# Patient Record
Sex: MALE | Race: WHITE | NOT HISPANIC OR LATINO | Employment: UNEMPLOYED | URBAN - METROPOLITAN AREA
[De-identification: names, ages, dates, MRNs, and addresses within clinical notes are randomized per-mention and may not be internally consistent; named-entity substitution may affect disease eponyms.]

---

## 2018-03-09 ENCOUNTER — OFFICE VISIT (OUTPATIENT)
Dept: FAMILY MEDICINE | Age: 38
End: 2018-03-09

## 2018-03-09 VITALS
WEIGHT: 247 LBS | HEART RATE: 97 BPM | BODY MASS INDEX: 35.36 KG/M2 | SYSTOLIC BLOOD PRESSURE: 182 MMHG | RESPIRATION RATE: 18 BRPM | HEIGHT: 70 IN | TEMPERATURE: 97.8 F | OXYGEN SATURATION: 96 % | DIASTOLIC BLOOD PRESSURE: 104 MMHG

## 2018-03-09 DIAGNOSIS — I10 ESSENTIAL HYPERTENSION: ICD-10-CM

## 2018-03-09 DIAGNOSIS — F10.10 ETOH ABUSE: ICD-10-CM

## 2018-03-09 DIAGNOSIS — F32.1 MODERATE MAJOR DEPRESSION (CMD): Primary | ICD-10-CM

## 2018-03-09 PROCEDURE — 99203 OFFICE O/P NEW LOW 30 MIN: CPT | Performed by: FAMILY MEDICINE

## 2018-03-09 RX ORDER — METOPROLOL SUCCINATE 50 MG/1
50 TABLET, EXTENDED RELEASE ORAL DAILY
COMMUNITY
End: 2018-03-09 | Stop reason: SDUPTHER

## 2018-03-09 RX ORDER — TRAZODONE HYDROCHLORIDE 100 MG/1
100 TABLET ORAL NIGHTLY
COMMUNITY
End: 2018-03-09 | Stop reason: SDUPTHER

## 2018-03-09 RX ORDER — PANTOPRAZOLE SODIUM 40 MG/1
40 TABLET, DELAYED RELEASE ORAL DAILY
COMMUNITY
End: 2018-03-09 | Stop reason: SDUPTHER

## 2018-03-09 RX ORDER — LISINOPRIL 10 MG/1
10 TABLET ORAL DAILY
Qty: 30 TABLET | Refills: 3 | Status: SHIPPED | OUTPATIENT
Start: 2018-03-09 | End: 2018-05-11 | Stop reason: SDUPTHER

## 2018-03-09 RX ORDER — LISINOPRIL 10 MG/1
10 TABLET ORAL DAILY
COMMUNITY
End: 2018-03-09 | Stop reason: SDUPTHER

## 2018-03-09 RX ORDER — PANTOPRAZOLE SODIUM 40 MG/1
40 TABLET, DELAYED RELEASE ORAL DAILY
Qty: 30 TABLET | Refills: 3 | Status: SHIPPED | OUTPATIENT
Start: 2018-03-09 | End: 2018-05-11 | Stop reason: SDUPTHER

## 2018-03-09 RX ORDER — THIAMINE MONONITRATE (VIT B1) 100 MG
100 TABLET ORAL DAILY
COMMUNITY
End: 2018-03-09 | Stop reason: ALTCHOICE

## 2018-03-09 RX ORDER — TRAZODONE HYDROCHLORIDE 100 MG/1
100 TABLET ORAL NIGHTLY
Qty: 30 TABLET | Refills: 3 | Status: SHIPPED | OUTPATIENT
Start: 2018-03-09 | End: 2018-05-11 | Stop reason: SDUPTHER

## 2018-03-09 RX ORDER — METOPROLOL SUCCINATE 50 MG/1
50 TABLET, EXTENDED RELEASE ORAL DAILY
Qty: 30 TABLET | Refills: 3 | Status: SHIPPED | OUTPATIENT
Start: 2018-03-09 | End: 2018-05-11 | Stop reason: SDUPTHER

## 2018-03-23 ENCOUNTER — TELEPHONE (OUTPATIENT)
Dept: FAMILY MEDICINE | Age: 38
End: 2018-03-23

## 2018-03-27 PROBLEM — F32.1 MODERATE MAJOR DEPRESSION (CMD): Status: ACTIVE | Noted: 2018-03-27

## 2018-03-27 PROBLEM — I10 ESSENTIAL HYPERTENSION: Status: ACTIVE | Noted: 2018-03-27

## 2018-03-27 PROBLEM — F10.10 ETOH ABUSE: Status: ACTIVE | Noted: 2018-03-27

## 2018-05-11 ENCOUNTER — OFFICE VISIT (OUTPATIENT)
Dept: FAMILY MEDICINE | Age: 38
End: 2018-05-11

## 2018-05-11 ENCOUNTER — LAB SERVICES (OUTPATIENT)
Dept: LAB | Age: 38
End: 2018-05-11

## 2018-05-11 VITALS
DIASTOLIC BLOOD PRESSURE: 78 MMHG | WEIGHT: 252 LBS | HEART RATE: 91 BPM | BODY MASS INDEX: 36.08 KG/M2 | HEIGHT: 70 IN | RESPIRATION RATE: 18 BRPM | OXYGEN SATURATION: 99 % | SYSTOLIC BLOOD PRESSURE: 130 MMHG | TEMPERATURE: 97.8 F

## 2018-05-11 DIAGNOSIS — I10 ESSENTIAL HYPERTENSION: Primary | ICD-10-CM

## 2018-05-11 DIAGNOSIS — I10 ESSENTIAL HYPERTENSION: ICD-10-CM

## 2018-05-11 DIAGNOSIS — F32.1 MODERATE MAJOR DEPRESSION (CMD): ICD-10-CM

## 2018-05-11 DIAGNOSIS — F10.10 ETOH ABUSE: ICD-10-CM

## 2018-05-11 LAB
ALBUMIN SERPL-MCNC: 4.2 G/DL (ref 3.6–5.1)
ALBUMIN/GLOB SERPL: 1.3 {RATIO} (ref 1–2.4)
ALP SERPL-CCNC: 75 UNITS/L (ref 45–117)
ALT SERPL-CCNC: 26 UNITS/L
ANION GAP SERPL CALC-SCNC: 16 MMOL/L (ref 10–20)
AST SERPL-CCNC: 19 UNITS/L
BILIRUB SERPL-MCNC: 1.1 MG/DL (ref 0.2–1)
BUN SERPL-MCNC: 14 MG/DL (ref 6–20)
BUN/CREAT SERPL: 23 (ref 7–25)
CALCIUM SERPL-MCNC: 9 MG/DL (ref 8.4–10.2)
CHLORIDE SERPL-SCNC: 104 MMOL/L (ref 98–107)
CO2 SERPL-SCNC: 25 MMOL/L (ref 21–32)
CREAT SERPL-MCNC: 0.62 MG/DL (ref 0.67–1.17)
FASTING STATUS PATIENT QL REPORTED: 1.5 HRS
GLOBULIN SER-MCNC: 3.2 G/DL (ref 2–4)
GLUCOSE SERPL-MCNC: 89 MG/DL (ref 65–99)
POTASSIUM SERPL-SCNC: 4.2 MMOL/L (ref 3.4–5.1)
PROT SERPL-MCNC: 7.4 G/DL (ref 6.4–8.2)
SODIUM SERPL-SCNC: 141 MMOL/L (ref 135–145)

## 2018-05-11 PROCEDURE — 36415 COLL VENOUS BLD VENIPUNCTURE: CPT | Performed by: INTERNAL MEDICINE

## 2018-05-11 PROCEDURE — 99214 OFFICE O/P EST MOD 30 MIN: CPT | Performed by: FAMILY MEDICINE

## 2018-05-11 RX ORDER — PANTOPRAZOLE SODIUM 40 MG/1
40 TABLET, DELAYED RELEASE ORAL DAILY
Qty: 30 TABLET | Refills: 3 | Status: SHIPPED | OUTPATIENT
Start: 2018-05-11 | End: 2018-06-18 | Stop reason: SDUPTHER

## 2018-05-11 RX ORDER — TRAZODONE HYDROCHLORIDE 100 MG/1
100 TABLET ORAL NIGHTLY
Qty: 30 TABLET | Refills: 3 | Status: SHIPPED | OUTPATIENT
Start: 2018-05-11 | End: 2020-07-03 | Stop reason: ALTCHOICE

## 2018-05-11 RX ORDER — METOPROLOL SUCCINATE 50 MG/1
50 TABLET, EXTENDED RELEASE ORAL DAILY
Qty: 30 TABLET | Refills: 3 | Status: SHIPPED | OUTPATIENT
Start: 2018-05-11 | End: 2018-06-18 | Stop reason: SDUPTHER

## 2018-05-11 RX ORDER — LISINOPRIL 10 MG/1
10 TABLET ORAL DAILY
Qty: 30 TABLET | Refills: 3 | Status: SHIPPED | OUTPATIENT
Start: 2018-05-11 | End: 2018-06-18 | Stop reason: SDUPTHER

## 2018-05-11 RX ORDER — DISULFIRAM 250 MG/1
250 TABLET ORAL DAILY
Qty: 30 TABLET | Refills: 3 | Status: SHIPPED | OUTPATIENT
Start: 2018-05-11 | End: 2018-06-18 | Stop reason: SDUPTHER

## 2018-05-15 ENCOUNTER — TELEPHONE (OUTPATIENT)
Dept: FAMILY MEDICINE | Age: 38
End: 2018-05-15

## 2018-06-18 ENCOUNTER — OFFICE VISIT (OUTPATIENT)
Dept: FAMILY MEDICINE | Age: 38
End: 2018-06-18

## 2018-06-18 VITALS
TEMPERATURE: 98.7 F | WEIGHT: 259 LBS | RESPIRATION RATE: 18 BRPM | BODY MASS INDEX: 37.08 KG/M2 | OXYGEN SATURATION: 100 % | DIASTOLIC BLOOD PRESSURE: 73 MMHG | SYSTOLIC BLOOD PRESSURE: 117 MMHG | HEIGHT: 70 IN | HEART RATE: 75 BPM

## 2018-06-18 DIAGNOSIS — F10.10 ETOH ABUSE: Primary | ICD-10-CM

## 2018-06-18 PROCEDURE — 99213 OFFICE O/P EST LOW 20 MIN: CPT | Performed by: FAMILY MEDICINE

## 2018-06-18 RX ORDER — LISINOPRIL 10 MG/1
10 TABLET ORAL DAILY
Qty: 90 TABLET | Refills: 3 | Status: SHIPPED | OUTPATIENT
Start: 2018-06-18 | End: 2020-07-03 | Stop reason: SDUPTHER

## 2018-06-18 RX ORDER — DISULFIRAM 250 MG/1
250 TABLET ORAL DAILY
Qty: 90 TABLET | Refills: 3 | Status: SHIPPED | OUTPATIENT
Start: 2018-06-18 | End: 2020-07-03 | Stop reason: ALTCHOICE

## 2018-06-18 RX ORDER — METOPROLOL SUCCINATE 50 MG/1
50 TABLET, EXTENDED RELEASE ORAL DAILY
Qty: 90 TABLET | Refills: 3 | Status: SHIPPED | OUTPATIENT
Start: 2018-06-18 | End: 2020-07-03 | Stop reason: SDUPTHER

## 2018-06-18 RX ORDER — PANTOPRAZOLE SODIUM 40 MG/1
40 TABLET, DELAYED RELEASE ORAL DAILY
Qty: 90 TABLET | Refills: 3 | Status: SHIPPED | OUTPATIENT
Start: 2018-06-18 | End: 2020-07-03 | Stop reason: ALTCHOICE

## 2018-08-30 ENCOUNTER — TELEPHONE (OUTPATIENT)
Dept: FAMILY MEDICINE | Age: 38
End: 2018-08-30

## 2018-08-30 DIAGNOSIS — F10.10 ETOH ABUSE: Primary | ICD-10-CM

## 2018-09-04 ENCOUNTER — HOSPITAL ENCOUNTER (OUTPATIENT)
Dept: BEHAVIORAL HEALTH | Age: 38
Discharge: HOME | End: 2018-09-04
Attending: PSYCHIATRY & NEUROLOGY

## 2018-09-04 ASSESSMENT — LIFESTYLE VARIABLES
ALCOHOL_USE: YES
AUDIT-C TOTAL SCORE: 12
HOW MANY STANDARD DRINKS CONTAINING ALCOHOL DO YOU HAVE ON A TYPICAL DAY: 10 OR MORE
HOW OFTEN DO YOU HAVE A DRINK CONTAINING ALCOHOL: 4 OR MORE TIMES PER WEEK
HOW OFTEN DO YOU HAVE 6 OR MORE DRINKS ON ONE OCCASION: DAILY OR ALMOST DAILY

## 2018-09-04 ASSESSMENT — COLUMBIA-SUICIDE SEVERITY RATING SCALE - C-SSRS
TOTAL  NUMBER OF INTERRUPTED ATTEMPTS PAST 3 MONTHS: NO
6. HAVE YOU EVER DONE ANYTHING, STARTED TO DO ANYTHING, OR PREPARED TO DO ANYTHING TO END YOUR LIFE?: NO
ATTEMPT LIFETIME: NO
TOTAL  NUMBER OF ABORTED OR SELF INTERRUPTED ATTEMPTS PAST 3 MONTHS: NO
ATTEMPT PAST THREE MONTHS: NO
6. HAVE YOU EVER DONE ANYTHING, STARTED TO DO ANYTHING, OR PREPARED TO DO ANYTHING TO END YOUR LIFE?: NO
TOTAL  NUMBER OF INTERRUPTED ATTEMPTS LIFETIME: NO

## 2018-09-04 ASSESSMENT — COGNITIVE AND FUNCTIONAL STATUS - GENERAL
INSIGHT: UNREMARKABLE
MOOD: UNREMARKABLE
ATTENTION: UNREMARKABLE
THOUGHT_CONTENT: UNREMARKABLE
MOTOR_BEHAVIOR_RETARDATION: UNREMARKABLE
AFFECT_AND_BEHAVIOR: UNABLE TO ASSESS
JUDGEMENT: UNREMARKABLE
ORIENTED: PERSON;PLACE;CIRCUMSTANCE;TIME
THOUGHT_PROCESS: UNREMARKABLE
MOTOR_BEHAVIOR_AGITATION: UNREMARKABLE
LEVEL_OF_CONSCIOUSNESS: ALERT
MEMORY: UNREMARKABLE
SPEECH: UNREMARKABLE
DISORIENTED: TIME;CIRCUMSTANCE;PERSON;PLACE
APPEARANCE_AND_DRESS: UNREMARKABLE

## 2018-10-29 RX ORDER — DISULFIRAM 250 MG/1
250 TABLET ORAL DAILY
Qty: 30 TABLET | Refills: 0 | Status: SHIPPED | OUTPATIENT
Start: 2018-10-29 | End: 2020-07-03 | Stop reason: ALTCHOICE

## 2019-03-19 RX ORDER — LISINOPRIL 10 MG/1
10 TABLET ORAL DAILY
Qty: 30 TABLET | Refills: 0 | Status: SHIPPED | OUTPATIENT
Start: 2019-03-19 | End: 2020-03-06 | Stop reason: SDUPTHER

## 2019-03-19 RX ORDER — METOPROLOL SUCCINATE 50 MG/1
50 TABLET, EXTENDED RELEASE ORAL DAILY
Qty: 30 TABLET | Refills: 0 | Status: SHIPPED | OUTPATIENT
Start: 2019-03-19 | End: 2020-03-06 | Stop reason: SDUPTHER

## 2020-03-06 RX ORDER — METOPROLOL SUCCINATE 50 MG/1
50 TABLET, EXTENDED RELEASE ORAL DAILY
Qty: 30 TABLET | Refills: 0 | Status: SHIPPED | OUTPATIENT
Start: 2020-03-06 | End: 2020-03-23 | Stop reason: SDUPTHER

## 2020-03-06 RX ORDER — LISINOPRIL 10 MG/1
10 TABLET ORAL DAILY
Qty: 30 TABLET | Refills: 0 | Status: SHIPPED | OUTPATIENT
Start: 2020-03-06 | End: 2020-03-23 | Stop reason: SDUPTHER

## 2020-03-09 RX ORDER — METOPROLOL SUCCINATE 50 MG/1
50 TABLET, EXTENDED RELEASE ORAL DAILY
Qty: 90 TABLET | Refills: 0 | OUTPATIENT
Start: 2020-03-09

## 2020-03-09 RX ORDER — LISINOPRIL 10 MG/1
10 TABLET ORAL DAILY
Qty: 90 TABLET | Refills: 0 | OUTPATIENT
Start: 2020-03-09

## 2020-03-23 ENCOUNTER — TELEPHONE (OUTPATIENT)
Dept: FAMILY MEDICINE | Age: 40
End: 2020-03-23

## 2020-03-23 RX ORDER — LISINOPRIL 10 MG/1
10 TABLET ORAL DAILY
Qty: 90 TABLET | Refills: 0 | Status: SHIPPED | OUTPATIENT
Start: 2020-03-23 | End: 2020-07-03 | Stop reason: SDUPTHER

## 2020-03-23 RX ORDER — METOPROLOL SUCCINATE 50 MG/1
50 TABLET, EXTENDED RELEASE ORAL DAILY
Qty: 90 TABLET | Refills: 0 | Status: SHIPPED | OUTPATIENT
Start: 2020-03-23 | End: 2020-07-03 | Stop reason: SDUPTHER

## 2020-07-03 ENCOUNTER — OFFICE VISIT (OUTPATIENT)
Dept: FAMILY MEDICINE | Age: 40
End: 2020-07-03

## 2020-07-03 VITALS
SYSTOLIC BLOOD PRESSURE: 165 MMHG | HEART RATE: 68 BPM | HEIGHT: 70 IN | BODY MASS INDEX: 38.73 KG/M2 | DIASTOLIC BLOOD PRESSURE: 88 MMHG | WEIGHT: 270.5 LBS | TEMPERATURE: 97.1 F | OXYGEN SATURATION: 100 %

## 2020-07-03 DIAGNOSIS — I10 ESSENTIAL HYPERTENSION: Primary | ICD-10-CM

## 2020-07-03 DIAGNOSIS — Z23 NEED FOR VACCINATION: ICD-10-CM

## 2020-07-03 DIAGNOSIS — F10.10 ETOH ABUSE: ICD-10-CM

## 2020-07-03 PROCEDURE — 99214 OFFICE O/P EST MOD 30 MIN: CPT | Performed by: FAMILY MEDICINE

## 2020-07-03 PROCEDURE — 90715 TDAP VACCINE 7 YRS/> IM: CPT | Performed by: FAMILY MEDICINE

## 2020-07-03 PROCEDURE — 90471 IMMUNIZATION ADMIN: CPT | Performed by: FAMILY MEDICINE

## 2020-07-03 RX ORDER — LISINOPRIL 20 MG/1
20 TABLET ORAL DAILY
Qty: 90 TABLET | Refills: 1 | Status: SHIPPED | OUTPATIENT
Start: 2020-07-03 | End: 2020-12-09 | Stop reason: SDUPTHER

## 2020-07-03 RX ORDER — METOPROLOL SUCCINATE 50 MG/1
50 TABLET, EXTENDED RELEASE ORAL DAILY
Qty: 90 TABLET | Refills: 1 | Status: SHIPPED | OUTPATIENT
Start: 2020-07-03 | End: 2020-12-09 | Stop reason: SDUPTHER

## 2020-07-03 SDOH — HEALTH STABILITY: MENTAL HEALTH: HOW OFTEN DO YOU HAVE 6 OR MORE DRINKS ON ONE OCCASION?: DAILY OR ALMOST DAILY

## 2020-07-03 SDOH — HEALTH STABILITY: MENTAL HEALTH: HOW MANY STANDARD DRINKS CONTAINING ALCOHOL DO YOU HAVE ON A TYPICAL DAY?: 10 OR MORE

## 2020-07-03 SDOH — HEALTH STABILITY: MENTAL HEALTH: HOW OFTEN DO YOU HAVE A DRINK CONTAINING ALCOHOL?: 4 OR MORE TIMES A WEEK

## 2020-07-03 ASSESSMENT — PATIENT HEALTH QUESTIONNAIRE - PHQ9
SUM OF ALL RESPONSES TO PHQ9 QUESTIONS 1 AND 2: 0
CLINICAL INTERPRETATION OF PHQ2 SCORE: NO FURTHER SCREENING NEEDED
1. LITTLE INTEREST OR PLEASURE IN DOING THINGS: NOT AT ALL
SUM OF ALL RESPONSES TO PHQ9 QUESTIONS 1 AND 2: 0
CLINICAL INTERPRETATION OF PHQ9 SCORE: NO FURTHER SCREENING NEEDED
2. FEELING DOWN, DEPRESSED OR HOPELESS: NOT AT ALL

## 2020-07-07 ENCOUNTER — TELEPHONE (OUTPATIENT)
Dept: FAMILY MEDICINE | Age: 40
End: 2020-07-07

## 2020-10-07 ENCOUNTER — TELEPHONE (OUTPATIENT)
Dept: FAMILY MEDICINE | Age: 40
End: 2020-10-07

## 2020-12-10 RX ORDER — METOPROLOL SUCCINATE 50 MG/1
50 TABLET, EXTENDED RELEASE ORAL DAILY
Qty: 90 TABLET | Refills: 0 | Status: SHIPPED | OUTPATIENT
Start: 2020-12-10 | End: 2021-03-15 | Stop reason: SDUPTHER

## 2020-12-10 RX ORDER — LISINOPRIL 20 MG/1
20 TABLET ORAL DAILY
Qty: 90 TABLET | Refills: 0 | Status: SHIPPED | OUTPATIENT
Start: 2020-12-10 | End: 2021-03-15 | Stop reason: SDUPTHER

## 2021-03-18 RX ORDER — METOPROLOL SUCCINATE 50 MG/1
50 TABLET, EXTENDED RELEASE ORAL DAILY
Qty: 90 TABLET | Refills: 0 | Status: SHIPPED | OUTPATIENT
Start: 2021-03-18 | End: 2021-06-14

## 2021-03-18 RX ORDER — LISINOPRIL 20 MG/1
20 TABLET ORAL DAILY
Qty: 90 TABLET | Refills: 0 | Status: SHIPPED | OUTPATIENT
Start: 2021-03-18 | End: 2021-06-14

## 2021-06-14 RX ORDER — LISINOPRIL 20 MG/1
20 TABLET ORAL DAILY
Qty: 30 TABLET | Refills: 0 | Status: SHIPPED | OUTPATIENT
Start: 2021-06-14 | End: 2021-07-28 | Stop reason: SDUPTHER

## 2021-06-14 RX ORDER — METOPROLOL SUCCINATE 50 MG/1
50 TABLET, EXTENDED RELEASE ORAL DAILY
Qty: 30 TABLET | Refills: 0 | Status: SHIPPED | OUTPATIENT
Start: 2021-06-14 | End: 2021-07-28 | Stop reason: SDUPTHER

## 2021-06-14 RX ORDER — METOPROLOL SUCCINATE 50 MG/1
TABLET, EXTENDED RELEASE ORAL
Qty: 90 TABLET | OUTPATIENT
Start: 2021-06-14

## 2021-06-14 RX ORDER — LISINOPRIL 20 MG/1
TABLET ORAL
Qty: 90 TABLET | OUTPATIENT
Start: 2021-06-14

## 2021-07-28 RX ORDER — METOPROLOL SUCCINATE 50 MG/1
TABLET, EXTENDED RELEASE ORAL
Qty: 30 TABLET | Refills: 0 | OUTPATIENT
Start: 2021-07-28

## 2021-07-28 RX ORDER — LISINOPRIL 20 MG/1
TABLET ORAL
Qty: 30 TABLET | Refills: 0 | OUTPATIENT
Start: 2021-07-28

## 2021-07-28 RX ORDER — METOPROLOL SUCCINATE 50 MG/1
50 TABLET, EXTENDED RELEASE ORAL DAILY
Qty: 30 TABLET | Refills: 0 | Status: SHIPPED | OUTPATIENT
Start: 2021-07-28 | End: 2021-09-08

## 2021-07-28 RX ORDER — LISINOPRIL 20 MG/1
20 TABLET ORAL DAILY
Qty: 30 TABLET | Refills: 0 | Status: SHIPPED | OUTPATIENT
Start: 2021-07-28 | End: 2021-09-08

## 2021-07-29 RX ORDER — LISINOPRIL 20 MG/1
20 TABLET ORAL DAILY
Qty: 90 TABLET | OUTPATIENT
Start: 2021-07-29

## 2021-07-29 RX ORDER — METOPROLOL SUCCINATE 50 MG/1
50 TABLET, EXTENDED RELEASE ORAL DAILY
Qty: 90 TABLET | OUTPATIENT
Start: 2021-07-29

## 2021-09-08 RX ORDER — METOPROLOL SUCCINATE 50 MG/1
50 TABLET, EXTENDED RELEASE ORAL DAILY
Qty: 30 TABLET | Refills: 1 | Status: SHIPPED | OUTPATIENT
Start: 2021-09-08 | End: 2021-11-02 | Stop reason: SDUPTHER

## 2021-09-08 RX ORDER — LISINOPRIL 20 MG/1
20 TABLET ORAL DAILY
Qty: 30 TABLET | Refills: 1 | Status: SHIPPED | OUTPATIENT
Start: 2021-09-08 | End: 2021-11-02 | Stop reason: SDUPTHER

## 2021-11-02 ENCOUNTER — OFFICE VISIT (OUTPATIENT)
Dept: FAMILY MEDICINE | Age: 41
End: 2021-11-02

## 2021-11-02 ENCOUNTER — LAB SERVICES (OUTPATIENT)
Dept: LAB | Age: 41
End: 2021-11-02

## 2021-11-02 VITALS
DIASTOLIC BLOOD PRESSURE: 91 MMHG | SYSTOLIC BLOOD PRESSURE: 158 MMHG | HEART RATE: 84 BPM | TEMPERATURE: 98.4 F | OXYGEN SATURATION: 99 % | BODY MASS INDEX: 36.96 KG/M2 | HEIGHT: 71 IN | WEIGHT: 264 LBS

## 2021-11-02 DIAGNOSIS — Z23 NEED FOR PNEUMOCOCCAL VACCINATION: ICD-10-CM

## 2021-11-02 DIAGNOSIS — Z23 INFLUENZA VACCINE NEEDED: ICD-10-CM

## 2021-11-02 DIAGNOSIS — Z00.00 ROUTINE GENERAL MEDICAL EXAMINATION AT A HEALTH CARE FACILITY: Primary | ICD-10-CM

## 2021-11-02 DIAGNOSIS — Z00.00 ROUTINE GENERAL MEDICAL EXAMINATION AT HEALTH CARE FACILITY: ICD-10-CM

## 2021-11-02 DIAGNOSIS — Z00.00 ROUTINE GENERAL MEDICAL EXAMINATION AT A HEALTH CARE FACILITY: ICD-10-CM

## 2021-11-02 PROCEDURE — 80053 COMPREHEN METABOLIC PANEL: CPT | Performed by: INTERNAL MEDICINE

## 2021-11-02 PROCEDURE — 90471 IMMUNIZATION ADMIN: CPT | Performed by: FAMILY MEDICINE

## 2021-11-02 PROCEDURE — 99396 PREV VISIT EST AGE 40-64: CPT | Performed by: FAMILY MEDICINE

## 2021-11-02 PROCEDURE — 36415 COLL VENOUS BLD VENIPUNCTURE: CPT | Performed by: INTERNAL MEDICINE

## 2021-11-02 PROCEDURE — 90686 IIV4 VACC NO PRSV 0.5 ML IM: CPT | Performed by: FAMILY MEDICINE

## 2021-11-02 PROCEDURE — 90732 PPSV23 VACC 2 YRS+ SUBQ/IM: CPT | Performed by: FAMILY MEDICINE

## 2021-11-02 PROCEDURE — 82306 VITAMIN D 25 HYDROXY: CPT | Performed by: INTERNAL MEDICINE

## 2021-11-02 RX ORDER — LISINOPRIL 20 MG/1
20 TABLET ORAL DAILY
Qty: 90 TABLET | Refills: 1 | Status: SHIPPED | OUTPATIENT
Start: 2021-11-02 | End: 2021-11-05

## 2021-11-02 RX ORDER — OMEPRAZOLE 20 MG/1
20 CAPSULE, DELAYED RELEASE ORAL DAILY
Qty: 90 CAPSULE | Refills: 3 | Status: SHIPPED | COMMUNITY
Start: 2021-11-02

## 2021-11-02 RX ORDER — METOPROLOL SUCCINATE 50 MG/1
50 TABLET, EXTENDED RELEASE ORAL DAILY
Qty: 90 TABLET | Refills: 1 | Status: SHIPPED | OUTPATIENT
Start: 2021-11-02 | End: 2021-11-05

## 2021-11-02 ASSESSMENT — PATIENT HEALTH QUESTIONNAIRE - PHQ9
1. LITTLE INTEREST OR PLEASURE IN DOING THINGS: NOT AT ALL
CLINICAL INTERPRETATION OF PHQ9 SCORE: NO FURTHER SCREENING NEEDED
SUM OF ALL RESPONSES TO PHQ9 QUESTIONS 1 AND 2: 0
CLINICAL INTERPRETATION OF PHQ2 SCORE: NO FURTHER SCREENING NEEDED
SUM OF ALL RESPONSES TO PHQ9 QUESTIONS 1 AND 2: 0
2. FEELING DOWN, DEPRESSED OR HOPELESS: NOT AT ALL
SUM OF ALL RESPONSES TO PHQ9 QUESTIONS 1 AND 2: 0

## 2021-11-03 ENCOUNTER — TELEPHONE (OUTPATIENT)
Dept: FAMILY MEDICINE | Age: 41
End: 2021-11-03

## 2021-11-03 DIAGNOSIS — E55.9 VITAMIN D DEFICIENCY: Primary | ICD-10-CM

## 2021-11-03 LAB
25(OH)D3+25(OH)D2 SERPL-MCNC: 10.4 NG/ML (ref 30–100)
ALBUMIN SERPL-MCNC: 4.2 G/DL (ref 3.6–5.1)
ALBUMIN/GLOB SERPL: 1.2 {RATIO} (ref 1–2.4)
ALP SERPL-CCNC: 68 UNITS/L (ref 45–117)
ALT SERPL-CCNC: 30 UNITS/L
ANION GAP SERPL CALC-SCNC: 12 MMOL/L (ref 10–20)
AST SERPL-CCNC: 28 UNITS/L
BILIRUB SERPL-MCNC: 0.6 MG/DL (ref 0.2–1)
BUN SERPL-MCNC: 13 MG/DL (ref 6–20)
BUN/CREAT SERPL: 15 (ref 7–25)
CALCIUM SERPL-MCNC: 10.2 MG/DL (ref 8.4–10.2)
CHLORIDE SERPL-SCNC: 104 MMOL/L (ref 98–107)
CO2 SERPL-SCNC: 26 MMOL/L (ref 21–32)
CREAT SERPL-MCNC: 0.87 MG/DL (ref 0.67–1.17)
FASTING DURATION TIME PATIENT: ABNORMAL H
GFR SERPLBLD BASED ON 1.73 SQ M-ARVRAT: >90 ML/MIN
GLOBULIN SER-MCNC: 3.5 G/DL (ref 2–4)
GLUCOSE SERPL-MCNC: 101 MG/DL (ref 70–99)
POTASSIUM SERPL-SCNC: 4.7 MMOL/L (ref 3.4–5.1)
PROT SERPL-MCNC: 7.7 G/DL (ref 6.4–8.2)
SODIUM SERPL-SCNC: 137 MMOL/L (ref 135–145)

## 2021-11-05 RX ORDER — LISINOPRIL 20 MG/1
20 TABLET ORAL DAILY
Qty: 30 TABLET | Refills: 0 | Status: SHIPPED | OUTPATIENT
Start: 2021-11-05 | End: 2022-05-03 | Stop reason: SDUPTHER

## 2021-11-05 RX ORDER — METOPROLOL SUCCINATE 50 MG/1
50 TABLET, EXTENDED RELEASE ORAL DAILY
Qty: 30 TABLET | Refills: 0 | Status: SHIPPED | OUTPATIENT
Start: 2021-11-05 | End: 2022-05-03 | Stop reason: SDUPTHER

## 2022-01-13 ENCOUNTER — OFFICE VISIT (OUTPATIENT)
Dept: OCCUPATIONAL MEDICINE | Age: 42
End: 2022-01-13

## 2022-01-13 DIAGNOSIS — Z00.8 HEALTH EXAMINATION IN POPULATION SURVEY: ICD-10-CM

## 2022-01-13 PROCEDURE — OH123 RAPID TEST DRUG KIT & COLLECTION 5 PANEL: Performed by: PREVENTIVE MEDICINE

## 2022-05-03 ENCOUNTER — OFFICE VISIT (OUTPATIENT)
Dept: FAMILY MEDICINE | Age: 42
End: 2022-05-03

## 2022-05-03 VITALS
HEIGHT: 72 IN | WEIGHT: 276.6 LBS | BODY MASS INDEX: 37.46 KG/M2 | HEART RATE: 90 BPM | DIASTOLIC BLOOD PRESSURE: 82 MMHG | SYSTOLIC BLOOD PRESSURE: 134 MMHG | OXYGEN SATURATION: 99 %

## 2022-05-03 DIAGNOSIS — I10 ESSENTIAL HYPERTENSION: Primary | ICD-10-CM

## 2022-05-03 DIAGNOSIS — Z23 NEED FOR COVID-19 VACCINE: ICD-10-CM

## 2022-05-03 PROCEDURE — 91305 COVID 19 12Y AND OLDER PFIZER-BIONTECH - DO NOT DILUTE: CPT | Performed by: INTERNAL MEDICINE

## 2022-05-03 PROCEDURE — 99213 OFFICE O/P EST LOW 20 MIN: CPT | Performed by: FAMILY MEDICINE

## 2022-05-03 PROCEDURE — 0054A COVID 19 12Y AND OLDER PFIZER-BIONTECH - DO NOT DILUTE: CPT | Performed by: INTERNAL MEDICINE

## 2022-05-03 RX ORDER — LISINOPRIL 20 MG/1
20 TABLET ORAL DAILY
Qty: 90 TABLET | Refills: 1 | Status: SHIPPED | OUTPATIENT
Start: 2022-05-03 | End: 2023-02-01

## 2022-05-03 RX ORDER — METOPROLOL SUCCINATE 50 MG/1
50 TABLET, EXTENDED RELEASE ORAL DAILY
Qty: 90 TABLET | Refills: 1 | Status: SHIPPED | OUTPATIENT
Start: 2022-05-03 | End: 2023-02-01

## 2022-05-03 ASSESSMENT — PATIENT HEALTH QUESTIONNAIRE - PHQ9
SUM OF ALL RESPONSES TO PHQ9 QUESTIONS 1 AND 2: 0
CLINICAL INTERPRETATION OF PHQ2 SCORE: NO FURTHER SCREENING NEEDED
2. FEELING DOWN, DEPRESSED OR HOPELESS: NOT AT ALL
1. LITTLE INTEREST OR PLEASURE IN DOING THINGS: NOT AT ALL
SUM OF ALL RESPONSES TO PHQ9 QUESTIONS 1 AND 2: 0

## 2022-09-27 ENCOUNTER — TELEPHONE (OUTPATIENT)
Dept: SCHEDULING | Age: 42
End: 2022-09-27

## 2022-11-22 RX ORDER — METOPROLOL SUCCINATE 50 MG/1
50 TABLET, EXTENDED RELEASE ORAL DAILY
Qty: 90 TABLET | Refills: 1 | OUTPATIENT
Start: 2022-11-22

## 2022-11-22 RX ORDER — LISINOPRIL 20 MG/1
20 TABLET ORAL DAILY
Qty: 90 TABLET | Refills: 1 | OUTPATIENT
Start: 2022-11-22

## 2023-02-01 RX ORDER — METOPROLOL SUCCINATE 50 MG/1
50 TABLET, EXTENDED RELEASE ORAL DAILY
Qty: 90 TABLET | Refills: 1 | Status: SHIPPED | OUTPATIENT
Start: 2023-02-01

## 2023-02-01 RX ORDER — LISINOPRIL 20 MG/1
20 TABLET ORAL DAILY
Qty: 90 TABLET | Refills: 1 | Status: SHIPPED | OUTPATIENT
Start: 2023-02-01

## 2024-02-26 ENCOUNTER — HOSPITAL ENCOUNTER (INPATIENT)
Facility: HOSPITAL | Age: 44
LOS: 1 days | Discharge: HOME | DRG: 069 | End: 2024-02-27
Attending: EMERGENCY MEDICINE | Admitting: STUDENT IN AN ORGANIZED HEALTH CARE EDUCATION/TRAINING PROGRAM
Payer: OTHER GOVERNMENT

## 2024-02-26 ENCOUNTER — APPOINTMENT (OUTPATIENT)
Dept: CT IMAGING | Facility: HOSPITAL | Age: 44
DRG: 069 | End: 2024-02-26
Payer: OTHER GOVERNMENT

## 2024-02-26 ENCOUNTER — APPOINTMENT (OUTPATIENT)
Dept: MRI IMAGING | Facility: HOSPITAL | Age: 44
DRG: 069 | End: 2024-02-26
Payer: OTHER GOVERNMENT

## 2024-02-26 ENCOUNTER — APPOINTMENT (OUTPATIENT)
Dept: ULTRASOUND IMAGING | Facility: HOSPITAL | Age: 44
DRG: 069 | End: 2024-02-26
Payer: OTHER GOVERNMENT

## 2024-02-26 ENCOUNTER — APPOINTMENT (OUTPATIENT)
Dept: GENERAL RADIOLOGY | Facility: HOSPITAL | Age: 44
DRG: 069 | End: 2024-02-26
Payer: OTHER GOVERNMENT

## 2024-02-26 ENCOUNTER — APPOINTMENT (OUTPATIENT)
Dept: CARDIOLOGY | Facility: HOSPITAL | Age: 44
DRG: 069 | End: 2024-02-26
Payer: OTHER GOVERNMENT

## 2024-02-26 DIAGNOSIS — R26.2 DIFFICULTY IN WALKING: ICD-10-CM

## 2024-02-26 DIAGNOSIS — Z78.9 DECREASED ACTIVITIES OF DAILY LIVING (ADL): ICD-10-CM

## 2024-02-26 DIAGNOSIS — G45.9 TIA (TRANSIENT ISCHEMIC ATTACK): Primary | ICD-10-CM

## 2024-02-26 DIAGNOSIS — I10 HYPERTENSION, UNSPECIFIED TYPE: ICD-10-CM

## 2024-02-26 LAB
ALBUMIN SERPL-MCNC: 3.9 G/DL (ref 3.5–5.2)
ALBUMIN SERPL-MCNC: 4.2 G/DL (ref 3.5–5.2)
ALBUMIN/GLOB SERPL: 1.3 G/DL
ALBUMIN/GLOB SERPL: 1.4 G/DL
ALP SERPL-CCNC: 100 U/L (ref 39–117)
ALP SERPL-CCNC: 89 U/L (ref 39–117)
ALT SERPL W P-5'-P-CCNC: 30 U/L (ref 1–41)
ALT SERPL W P-5'-P-CCNC: 37 U/L (ref 1–41)
ANION GAP SERPL CALCULATED.3IONS-SCNC: 11.9 MMOL/L (ref 5–15)
ANION GAP SERPL CALCULATED.3IONS-SCNC: 18.5 MMOL/L (ref 5–15)
APTT PPP: 25.5 SECONDS (ref 24.2–34.2)
AST SERPL-CCNC: 46 U/L (ref 1–40)
AST SERPL-CCNC: 67 U/L (ref 1–40)
BACTERIA UR QL AUTO: ABNORMAL /HPF
BASOPHILS # BLD AUTO: 0.04 10*3/MM3 (ref 0–0.2)
BASOPHILS NFR BLD AUTO: 0.5 % (ref 0–1.5)
BH CV ECHO MEAS - AO MAX PG: 11.7 MMHG
BH CV ECHO MEAS - AO MEAN PG: 7.6 MMHG
BH CV ECHO MEAS - AO V2 MAX: 171 CM/SEC
BH CV ECHO MEAS - AO V2 VTI: 31.2 CM
BH CV ECHO MEAS - AVA(I,D): 2.7 CM2
BH CV ECHO MEAS - EDV(CUBED): 45.5 ML
BH CV ECHO MEAS - EDV(MOD-SP2): 75.1 ML
BH CV ECHO MEAS - EDV(MOD-SP4): 69.4 ML
BH CV ECHO MEAS - EF(MOD-BP): 56.2 %
BH CV ECHO MEAS - EF(MOD-SP2): 58.6 %
BH CV ECHO MEAS - EF(MOD-SP4): 56.5 %
BH CV ECHO MEAS - ESV(CUBED): 9.1 ML
BH CV ECHO MEAS - ESV(MOD-SP2): 31.1 ML
BH CV ECHO MEAS - ESV(MOD-SP4): 30.2 ML
BH CV ECHO MEAS - FS: 41.5 %
BH CV ECHO MEAS - IVS/LVPW: 1.06 CM
BH CV ECHO MEAS - IVSD: 1.13 CM
BH CV ECHO MEAS - LAT PEAK E' VEL: 12.6 CM/SEC
BH CV ECHO MEAS - LV DIASTOLIC VOL/BSA (35-75): 29.6 CM2
BH CV ECHO MEAS - LV MASS(C)D: 122.5 GRAMS
BH CV ECHO MEAS - LV MAX PG: 8.4 MMHG
BH CV ECHO MEAS - LV MEAN PG: 6 MMHG
BH CV ECHO MEAS - LV SYSTOLIC VOL/BSA (12-30): 12.9 CM2
BH CV ECHO MEAS - LV V1 MAX: 144.8 CM/SEC
BH CV ECHO MEAS - LV V1 VTI: 26.9 CM
BH CV ECHO MEAS - LVIDD: 3.6 CM
BH CV ECHO MEAS - LVIDS: 2.09 CM
BH CV ECHO MEAS - LVOT AREA: 3.1 CM2
BH CV ECHO MEAS - LVOT DIAM: 1.98 CM
BH CV ECHO MEAS - LVPWD: 1.07 CM
BH CV ECHO MEAS - MED PEAK E' VEL: 5.8 CM/SEC
BH CV ECHO MEAS - MV A MAX VEL: 104.8 CM/SEC
BH CV ECHO MEAS - MV DEC SLOPE: 202.8 CM/SEC2
BH CV ECHO MEAS - MV DEC TIME: 0.35 SEC
BH CV ECHO MEAS - MV E MAX VEL: 70.1 CM/SEC
BH CV ECHO MEAS - MV E/A: 0.67
BH CV ECHO MEAS - PA V2 MAX: 124.9 CM/SEC
BH CV ECHO MEAS - RVDD: 3.9 CM
BH CV ECHO MEAS - SI(MOD-SP2): 18.8 ML/M2
BH CV ECHO MEAS - SI(MOD-SP4): 16.7 ML/M2
BH CV ECHO MEAS - SV(LVOT): 83 ML
BH CV ECHO MEAS - SV(MOD-SP2): 44 ML
BH CV ECHO MEAS - SV(MOD-SP4): 39.2 ML
BH CV ECHO MEAS - TAPSE (>1.6): 2.44 CM
BH CV ECHO MEASUREMENTS AVERAGE E/E' RATIO: 7.62
BH CV ECHO SHUNT ASSESSMENT PERFORMED (HIDDEN SCRIPTING): 1
BILIRUB SERPL-MCNC: 2.1 MG/DL (ref 0–1.2)
BILIRUB SERPL-MCNC: 2.1 MG/DL (ref 0–1.2)
BILIRUB UR QL STRIP: NEGATIVE
BUN SERPL-MCNC: 12 MG/DL (ref 6–20)
BUN SERPL-MCNC: 13 MG/DL (ref 6–20)
BUN/CREAT SERPL: 10.7 (ref 7–25)
BUN/CREAT SERPL: 13.5 (ref 7–25)
CALCIUM SPEC-SCNC: 8.8 MG/DL (ref 8.6–10.5)
CALCIUM SPEC-SCNC: 9.2 MG/DL (ref 8.6–10.5)
CHLORIDE SERPL-SCNC: 97 MMOL/L (ref 98–107)
CHLORIDE SERPL-SCNC: 99 MMOL/L (ref 98–107)
CHOLEST SERPL-MCNC: 155 MG/DL (ref 0–200)
CLARITY UR: CLEAR
CO2 SERPL-SCNC: 16.5 MMOL/L (ref 22–29)
CO2 SERPL-SCNC: 19.1 MMOL/L (ref 22–29)
COLOR UR: YELLOW
CREAT SERPL-MCNC: 0.96 MG/DL (ref 0.76–1.27)
CREAT SERPL-MCNC: 1.12 MG/DL (ref 0.76–1.27)
D-LACTATE SERPL-SCNC: 1.3 MMOL/L (ref 0.5–2)
DEPRECATED RDW RBC AUTO: 34.8 FL (ref 37–54)
EGFRCR SERPLBLD CKD-EPI 2021: 82.6 ML/MIN/1.73
EGFRCR SERPLBLD CKD-EPI 2021: 99.3 ML/MIN/1.73
EOSINOPHIL # BLD AUTO: 0.01 10*3/MM3 (ref 0–0.4)
EOSINOPHIL NFR BLD AUTO: 0.1 % (ref 0.3–6.2)
ERYTHROCYTE [DISTWIDTH] IN BLOOD BY AUTOMATED COUNT: 11.5 % (ref 12.3–15.4)
GLOBULIN UR ELPH-MCNC: 2.8 GM/DL
GLOBULIN UR ELPH-MCNC: 3.3 GM/DL
GLUCOSE BLDC GLUCOMTR-MCNC: 110 MG/DL (ref 70–99)
GLUCOSE BLDC GLUCOMTR-MCNC: 138 MG/DL (ref 70–99)
GLUCOSE SERPL-MCNC: 129 MG/DL (ref 65–99)
GLUCOSE SERPL-MCNC: 133 MG/DL (ref 65–99)
GLUCOSE UR STRIP-MCNC: NEGATIVE MG/DL
HBA1C MFR BLD: 5.4 % (ref 4.8–5.6)
HCT VFR BLD AUTO: 44.1 % (ref 37.5–51)
HDLC SERPL-MCNC: 34 MG/DL (ref 40–60)
HGB BLD-MCNC: 16.3 G/DL (ref 13–17.7)
HGB UR QL STRIP.AUTO: NEGATIVE
HOLD SPECIMEN: NORMAL
HOLD SPECIMEN: NORMAL
HYALINE CASTS UR QL AUTO: ABNORMAL /LPF
IMM GRANULOCYTES # BLD AUTO: 0.03 10*3/MM3 (ref 0–0.05)
IMM GRANULOCYTES NFR BLD AUTO: 0.3 % (ref 0–0.5)
INR PPP: 0.96 (ref 0.86–1.15)
IVRT: 66 MS
KETONES UR QL STRIP: ABNORMAL
LDLC SERPL CALC-MCNC: 43 MG/DL (ref 0–100)
LDLC/HDLC SERPL: 0.38 {RATIO}
LEFT ATRIUM VOLUME INDEX: 9.7 ML/M2
LEUKOCYTE ESTERASE UR QL STRIP.AUTO: ABNORMAL
LYMPHOCYTES # BLD AUTO: 1.25 10*3/MM3 (ref 0.7–3.1)
LYMPHOCYTES NFR BLD AUTO: 14.4 % (ref 19.6–45.3)
MCH RBC QN AUTO: 31.2 PG (ref 26.6–33)
MCHC RBC AUTO-ENTMCNC: 37 G/DL (ref 31.5–35.7)
MCV RBC AUTO: 84.5 FL (ref 79–97)
MONOCYTES # BLD AUTO: 0.8 10*3/MM3 (ref 0.1–0.9)
MONOCYTES NFR BLD AUTO: 9.2 % (ref 5–12)
NEUTROPHILS NFR BLD AUTO: 6.58 10*3/MM3 (ref 1.7–7)
NEUTROPHILS NFR BLD AUTO: 75.5 % (ref 42.7–76)
NITRITE UR QL STRIP: NEGATIVE
NRBC BLD AUTO-RTO: 0 /100 WBC (ref 0–0.2)
PH UR STRIP.AUTO: >=9 [PH] (ref 5–8)
PLATELET # BLD AUTO: 173 10*3/MM3 (ref 140–450)
PMV BLD AUTO: 10 FL (ref 6–12)
POTASSIUM SERPL-SCNC: 3.3 MMOL/L (ref 3.5–5.2)
POTASSIUM SERPL-SCNC: 3.9 MMOL/L (ref 3.5–5.2)
PROT SERPL-MCNC: 6.7 G/DL (ref 6–8.5)
PROT SERPL-MCNC: 7.5 G/DL (ref 6–8.5)
PROT UR QL STRIP: ABNORMAL
PROTHROMBIN TIME: 13 SECONDS (ref 11.8–14.9)
QT INTERVAL: 371 MS
QTC INTERVAL: 436 MS
RBC # BLD AUTO: 5.22 10*6/MM3 (ref 4.14–5.8)
RBC # UR STRIP: ABNORMAL /HPF
REF LAB TEST METHOD: ABNORMAL
SODIUM SERPL-SCNC: 130 MMOL/L (ref 136–145)
SODIUM SERPL-SCNC: 132 MMOL/L (ref 136–145)
SP GR UR STRIP: >1.03 (ref 1–1.03)
SQUAMOUS #/AREA URNS HPF: ABNORMAL /HPF
T4 FREE SERPL-MCNC: 1.47 NG/DL (ref 0.93–1.7)
TRIGL SERPL-MCNC: 541 MG/DL (ref 0–150)
TROPONIN T SERPL HS-MCNC: 7 NG/L
TSH SERPL DL<=0.05 MIU/L-ACNC: 4.82 UIU/ML (ref 0.27–4.2)
UROBILINOGEN UR QL STRIP: ABNORMAL
VIT B12 BLD-MCNC: 337 PG/ML (ref 211–946)
VLDLC SERPL-MCNC: 78 MG/DL (ref 5–40)
WBC # UR STRIP: ABNORMAL /HPF
WBC NRBC COR # BLD AUTO: 8.71 10*3/MM3 (ref 3.4–10.8)
WHOLE BLOOD HOLD COAG: NORMAL
WHOLE BLOOD HOLD SPECIMEN: NORMAL
WHOLE BLOOD HOLD SPECIMEN: NORMAL

## 2024-02-26 PROCEDURE — 25510000001 IOPAMIDOL PER 1 ML: Performed by: EMERGENCY MEDICINE

## 2024-02-26 PROCEDURE — 82948 REAGENT STRIP/BLOOD GLUCOSE: CPT

## 2024-02-26 PROCEDURE — 83036 HEMOGLOBIN GLYCOSYLATED A1C: CPT | Performed by: STUDENT IN AN ORGANIZED HEALTH CARE EDUCATION/TRAINING PROGRAM

## 2024-02-26 PROCEDURE — 83605 ASSAY OF LACTIC ACID: CPT | Performed by: STUDENT IN AN ORGANIZED HEALTH CARE EDUCATION/TRAINING PROGRAM

## 2024-02-26 PROCEDURE — 80053 COMPREHEN METABOLIC PANEL: CPT | Performed by: EMERGENCY MEDICINE

## 2024-02-26 PROCEDURE — 92610 EVALUATE SWALLOWING FUNCTION: CPT

## 2024-02-26 PROCEDURE — 82607 VITAMIN B-12: CPT | Performed by: INTERNAL MEDICINE

## 2024-02-26 PROCEDURE — 76705 ECHO EXAM OF ABDOMEN: CPT

## 2024-02-26 PROCEDURE — 93306 TTE W/DOPPLER COMPLETE: CPT

## 2024-02-26 PROCEDURE — 94799 UNLISTED PULMONARY SVC/PX: CPT

## 2024-02-26 PROCEDURE — 25010000002 THIAMINE PER 100 MG: Performed by: STUDENT IN AN ORGANIZED HEALTH CARE EDUCATION/TRAINING PROGRAM

## 2024-02-26 PROCEDURE — 70496 CT ANGIOGRAPHY HEAD: CPT

## 2024-02-26 PROCEDURE — 84443 ASSAY THYROID STIM HORMONE: CPT | Performed by: INTERNAL MEDICINE

## 2024-02-26 PROCEDURE — 4A03X5D MEASUREMENT OF ARTERIAL FLOW, INTRACRANIAL, EXTERNAL APPROACH: ICD-10-PCS | Performed by: RADIOLOGY

## 2024-02-26 PROCEDURE — 71045 X-RAY EXAM CHEST 1 VIEW: CPT

## 2024-02-26 PROCEDURE — 25010000002 ONDANSETRON PER 1 MG: Performed by: EMERGENCY MEDICINE

## 2024-02-26 PROCEDURE — 25010000002 LABETALOL 5 MG/ML SOLUTION

## 2024-02-26 PROCEDURE — 93005 ELECTROCARDIOGRAM TRACING: CPT | Performed by: EMERGENCY MEDICINE

## 2024-02-26 PROCEDURE — 25010000002 HEPARIN (PORCINE) PER 1000 UNITS: Performed by: STUDENT IN AN ORGANIZED HEALTH CARE EDUCATION/TRAINING PROGRAM

## 2024-02-26 PROCEDURE — 80061 LIPID PANEL: CPT | Performed by: STUDENT IN AN ORGANIZED HEALTH CARE EDUCATION/TRAINING PROGRAM

## 2024-02-26 PROCEDURE — 81001 URINALYSIS AUTO W/SCOPE: CPT | Performed by: EMERGENCY MEDICINE

## 2024-02-26 PROCEDURE — 85025 COMPLETE CBC W/AUTO DIFF WBC: CPT | Performed by: EMERGENCY MEDICINE

## 2024-02-26 PROCEDURE — 84439 ASSAY OF FREE THYROXINE: CPT | Performed by: INTERNAL MEDICINE

## 2024-02-26 PROCEDURE — 99222 1ST HOSP IP/OBS MODERATE 55: CPT | Performed by: STUDENT IN AN ORGANIZED HEALTH CARE EDUCATION/TRAINING PROGRAM

## 2024-02-26 PROCEDURE — 0042T HC CT CEREBRAL PERFUSION W/WO CONTRAST: CPT

## 2024-02-26 PROCEDURE — 70450 CT HEAD/BRAIN W/O DYE: CPT

## 2024-02-26 PROCEDURE — 85610 PROTHROMBIN TIME: CPT | Performed by: EMERGENCY MEDICINE

## 2024-02-26 PROCEDURE — 93306 TTE W/DOPPLER COMPLETE: CPT | Performed by: STUDENT IN AN ORGANIZED HEALTH CARE EDUCATION/TRAINING PROGRAM

## 2024-02-26 PROCEDURE — 97165 OT EVAL LOW COMPLEX 30 MIN: CPT

## 2024-02-26 PROCEDURE — 94761 N-INVAS EAR/PLS OXIMETRY MLT: CPT

## 2024-02-26 PROCEDURE — 85730 THROMBOPLASTIN TIME PARTIAL: CPT | Performed by: EMERGENCY MEDICINE

## 2024-02-26 PROCEDURE — 99285 EMERGENCY DEPT VISIT HI MDM: CPT

## 2024-02-26 PROCEDURE — 70498 CT ANGIOGRAPHY NECK: CPT

## 2024-02-26 PROCEDURE — 70551 MRI BRAIN STEM W/O DYE: CPT

## 2024-02-26 PROCEDURE — 97161 PT EVAL LOW COMPLEX 20 MIN: CPT

## 2024-02-26 PROCEDURE — 80053 COMPREHEN METABOLIC PANEL: CPT | Performed by: INTERNAL MEDICINE

## 2024-02-26 PROCEDURE — 84484 ASSAY OF TROPONIN QUANT: CPT | Performed by: EMERGENCY MEDICINE

## 2024-02-26 RX ORDER — CLOPIDOGREL BISULFATE 75 MG/1
75 TABLET ORAL DAILY
Status: DISCONTINUED | OUTPATIENT
Start: 2024-02-27 | End: 2024-02-27 | Stop reason: HOSPADM

## 2024-02-26 RX ORDER — LORAZEPAM 2 MG/1
2 TABLET ORAL
Status: DISCONTINUED | OUTPATIENT
Start: 2024-02-26 | End: 2024-02-27 | Stop reason: HOSPADM

## 2024-02-26 RX ORDER — DIAZEPAM 5 MG/ML
2.5 INJECTION, SOLUTION INTRAMUSCULAR; INTRAVENOUS
Status: DISCONTINUED | OUTPATIENT
Start: 2024-02-26 | End: 2024-02-27 | Stop reason: HOSPADM

## 2024-02-26 RX ORDER — ASPIRIN 325 MG
325 TABLET, DELAYED RELEASE (ENTERIC COATED) ORAL ONCE
Status: COMPLETED | OUTPATIENT
Start: 2024-02-26 | End: 2024-02-26

## 2024-02-26 RX ORDER — ASPIRIN 300 MG/1
300 SUPPOSITORY RECTAL DAILY
Status: DISCONTINUED | OUTPATIENT
Start: 2024-02-27 | End: 2024-02-27 | Stop reason: HOSPADM

## 2024-02-26 RX ORDER — POTASSIUM CHLORIDE 750 MG/1
40 CAPSULE, EXTENDED RELEASE ORAL ONCE
Status: COMPLETED | OUTPATIENT
Start: 2024-02-26 | End: 2024-02-26

## 2024-02-26 RX ORDER — THIAMINE HYDROCHLORIDE 100 MG/ML
200 INJECTION, SOLUTION INTRAMUSCULAR; INTRAVENOUS EVERY 8 HOURS SCHEDULED
Status: DISCONTINUED | OUTPATIENT
Start: 2024-02-26 | End: 2024-02-27 | Stop reason: HOSPADM

## 2024-02-26 RX ORDER — LABETALOL HYDROCHLORIDE 5 MG/ML
INJECTION, SOLUTION INTRAVENOUS
Status: COMPLETED
Start: 2024-02-26 | End: 2024-02-26

## 2024-02-26 RX ORDER — DIAZEPAM 5 MG/ML
5 INJECTION, SOLUTION INTRAMUSCULAR; INTRAVENOUS
Status: DISCONTINUED | OUTPATIENT
Start: 2024-02-26 | End: 2024-02-27 | Stop reason: HOSPADM

## 2024-02-26 RX ORDER — LABETALOL HYDROCHLORIDE 5 MG/ML
10 INJECTION, SOLUTION INTRAVENOUS ONCE
Status: DISCONTINUED | OUTPATIENT
Start: 2024-02-26 | End: 2024-02-26

## 2024-02-26 RX ORDER — MELATONIN
2000 DAILY
COMMUNITY

## 2024-02-26 RX ORDER — PANTOPRAZOLE SODIUM 40 MG/1
40 TABLET, DELAYED RELEASE ORAL
Status: DISCONTINUED | OUTPATIENT
Start: 2024-02-26 | End: 2024-02-27 | Stop reason: HOSPADM

## 2024-02-26 RX ORDER — HEPARIN SODIUM 5000 [USP'U]/ML
5000 INJECTION, SOLUTION INTRAVENOUS; SUBCUTANEOUS EVERY 8 HOURS SCHEDULED
Status: DISCONTINUED | OUTPATIENT
Start: 2024-02-26 | End: 2024-02-27 | Stop reason: HOSPADM

## 2024-02-26 RX ORDER — LABETALOL HYDROCHLORIDE 5 MG/ML
10 INJECTION, SOLUTION INTRAVENOUS ONCE
Status: COMPLETED | OUTPATIENT
Start: 2024-02-26 | End: 2024-02-26

## 2024-02-26 RX ORDER — SODIUM CHLORIDE 0.9 % (FLUSH) 0.9 %
10 SYRINGE (ML) INJECTION AS NEEDED
Status: DISCONTINUED | OUTPATIENT
Start: 2024-02-26 | End: 2024-02-27 | Stop reason: HOSPADM

## 2024-02-26 RX ORDER — ASPIRIN 81 MG/1
81 TABLET, CHEWABLE ORAL DAILY
Status: DISCONTINUED | OUTPATIENT
Start: 2024-02-27 | End: 2024-02-27 | Stop reason: HOSPADM

## 2024-02-26 RX ORDER — FOLIC ACID 1 MG/1
1 TABLET ORAL DAILY
Status: DISCONTINUED | OUTPATIENT
Start: 2024-02-26 | End: 2024-02-27 | Stop reason: HOSPADM

## 2024-02-26 RX ORDER — METHION/INOS/CHOL BT/B COM/LIV 110MG-86MG
100 CAPSULE ORAL DAILY
Status: DISCONTINUED | OUTPATIENT
Start: 2024-03-02 | End: 2024-02-27 | Stop reason: HOSPADM

## 2024-02-26 RX ORDER — LISINOPRIL 20 MG/1
1 TABLET ORAL DAILY
COMMUNITY

## 2024-02-26 RX ORDER — LORAZEPAM 2 MG/1
4 TABLET ORAL
Status: DISCONTINUED | OUTPATIENT
Start: 2024-02-26 | End: 2024-02-27 | Stop reason: HOSPADM

## 2024-02-26 RX ORDER — DIAZEPAM 5 MG/ML
10 INJECTION, SOLUTION INTRAMUSCULAR; INTRAVENOUS
Status: DISCONTINUED | OUTPATIENT
Start: 2024-02-26 | End: 2024-02-27 | Stop reason: HOSPADM

## 2024-02-26 RX ORDER — LORAZEPAM 0.5 MG/1
1 TABLET ORAL
Status: DISCONTINUED | OUTPATIENT
Start: 2024-02-26 | End: 2024-02-27 | Stop reason: HOSPADM

## 2024-02-26 RX ORDER — CLOPIDOGREL BISULFATE 75 MG/1
300 TABLET ORAL ONCE
Status: COMPLETED | OUTPATIENT
Start: 2024-02-26 | End: 2024-02-26

## 2024-02-26 RX ORDER — ATORVASTATIN CALCIUM 40 MG/1
80 TABLET, FILM COATED ORAL NIGHTLY
Status: DISCONTINUED | OUTPATIENT
Start: 2024-02-26 | End: 2024-02-27 | Stop reason: HOSPADM

## 2024-02-26 RX ORDER — DIPHENOXYLATE HYDROCHLORIDE AND ATROPINE SULFATE 2.5; .025 MG/1; MG/1
1 TABLET ORAL DAILY
COMMUNITY

## 2024-02-26 RX ORDER — ONDANSETRON 2 MG/ML
4 INJECTION INTRAMUSCULAR; INTRAVENOUS ONCE
Status: COMPLETED | OUTPATIENT
Start: 2024-02-26 | End: 2024-02-26

## 2024-02-26 RX ORDER — SODIUM CHLORIDE 9 MG/ML
40 INJECTION, SOLUTION INTRAVENOUS AS NEEDED
Status: DISCONTINUED | OUTPATIENT
Start: 2024-02-26 | End: 2024-02-27 | Stop reason: HOSPADM

## 2024-02-26 RX ORDER — SODIUM CHLORIDE 0.9 % (FLUSH) 0.9 %
10 SYRINGE (ML) INJECTION EVERY 12 HOURS SCHEDULED
Status: DISCONTINUED | OUTPATIENT
Start: 2024-02-26 | End: 2024-02-27 | Stop reason: HOSPADM

## 2024-02-26 RX ORDER — OMEPRAZOLE 20 MG/1
1 CAPSULE, DELAYED RELEASE ORAL DAILY
COMMUNITY

## 2024-02-26 RX ADMIN — Medication 10 ML: at 08:57

## 2024-02-26 RX ADMIN — FOLIC ACID 1 MG: 1 TABLET ORAL at 08:57

## 2024-02-26 RX ADMIN — LABETALOL HYDROCHLORIDE 10 MG: 5 INJECTION, SOLUTION INTRAVENOUS at 02:30

## 2024-02-26 RX ADMIN — THIAMINE HYDROCHLORIDE 200 MG: 100 INJECTION, SOLUTION INTRAMUSCULAR; INTRAVENOUS at 21:01

## 2024-02-26 RX ADMIN — Medication 10 ML: at 21:01

## 2024-02-26 RX ADMIN — HEPARIN SODIUM 5000 UNITS: 5000 INJECTION INTRAVENOUS; SUBCUTANEOUS at 05:15

## 2024-02-26 RX ADMIN — PANTOPRAZOLE SODIUM 40 MG: 40 TABLET, DELAYED RELEASE ORAL at 05:15

## 2024-02-26 RX ADMIN — CLOPIDOGREL BISULFATE 300 MG: 75 TABLET ORAL at 05:05

## 2024-02-26 RX ADMIN — THIAMINE HYDROCHLORIDE 200 MG: 100 INJECTION, SOLUTION INTRAMUSCULAR; INTRAVENOUS at 05:15

## 2024-02-26 RX ADMIN — IOPAMIDOL 150 ML: 755 INJECTION, SOLUTION INTRAVENOUS at 02:28

## 2024-02-26 RX ADMIN — ONDANSETRON 4 MG: 2 INJECTION INTRAMUSCULAR; INTRAVENOUS at 03:10

## 2024-02-26 RX ADMIN — HEPARIN SODIUM 5000 UNITS: 5000 INJECTION INTRAVENOUS; SUBCUTANEOUS at 21:01

## 2024-02-26 RX ADMIN — THIAMINE HYDROCHLORIDE 200 MG: 100 INJECTION, SOLUTION INTRAMUSCULAR; INTRAVENOUS at 14:25

## 2024-02-26 RX ADMIN — ASPIRIN 325 MG: 325 TABLET, COATED ORAL at 05:09

## 2024-02-26 RX ADMIN — HEPARIN SODIUM 5000 UNITS: 5000 INJECTION INTRAVENOUS; SUBCUTANEOUS at 14:25

## 2024-02-26 RX ADMIN — Medication 10 ML: at 05:16

## 2024-02-26 RX ADMIN — ATORVASTATIN CALCIUM 80 MG: 40 TABLET, FILM COATED ORAL at 21:01

## 2024-02-26 RX ADMIN — POTASSIUM CHLORIDE 40 MEQ: 10 CAPSULE, COATED, EXTENDED RELEASE ORAL at 03:46

## 2024-02-26 NOTE — CONSULTS
Consult received per Stroke Protocol. Patient without a noted DM diagnosis, with a current HbA1c of 5.4%, and an estimated average glucose of 108 mg/dL. Blood glucose values have remained WDL, with a low of 110 mg/dL, and a high of 138 mg/dL.

## 2024-02-26 NOTE — THERAPY EVALUATION
Acute Care - Physical Therapy Initial Evaluation   [facility]     Patient Name: Ahsan Moran  : 1980  MRN: [id number]  Today's Date: 2024      Admit date: 2024     Referring Physician: Chapin Geller MD     Surgery Date:* No surgery found *            Visit Dx:     ICD-10-CM ICD-9-CM   1. TIA (transient ischemic attack)  G45.9 435.9   2. Hypertension, unspecified type  I10 401.9   3. Difficulty in walking  R26.2 719.7     Patient Active Problem List   Diagnosis    TIA (transient ischemic attack)     Past Medical History:   Diagnosis Date    A-fib     Hypertension      History reviewed. No pertinent surgical history.  PT Assessment (last 12 hours)       PT Evaluation and Treatment       Row Name 24 09          Physical Therapy Time and Intention    Subjective Information no complaints  -NANO     Document Type evaluation  -NANO     Mode of Treatment individual therapy;physical therapy  -NANO     Patient Effort good  -NANO       Row Name 24 09          General Information    Patient Observations alert;cooperative;agree to therapy  -NANO     Prior Level of Function independent:;all household mobility;community mobility  -NANO     Equipment Currently Used at Home none  -NANO     Existing Precautions/Restrictions no known precautions/restrictions  -NANO     Barriers to Rehab none identified  -NANO       Row Name 24 09          Living Environment    Current Living Arrangements home  -NANO       Row Name 24 09          Cognition    Orientation Status (Cognition) oriented x 3  -NANO       Row Name 24 09          Range of Motion (ROM)    Range of Motion ROM is WFL  -NANO       Row Name 24 09          Strength (Manual Muscle Testing)    Strength (Manual Muscle Testing) strength is WFL  -NANO       Row Name 24 09          Bed Mobility    Bed Mobility bed mobility (all) activities;supine-sit  -NANO     All Activities, Wilkin (Bed Mobility) independent  -NANO     Supine-Sit  Haakon (Bed Mobility) independent  -NANO       Row Name 02/26/24 0900          Transfers    Transfers sit-stand transfer  -NANO       Row Name 02/26/24 0900          Sit-Stand Transfer    Sit-Stand Haakon (Transfers) independent  -NANO       Row Name 02/26/24 0900          Gait/Stairs (Locomotion)    Gait/Stairs Locomotion gait/ambulation independence  -NANO     Haakon Level (Gait) independent  -NANO     Distance in Feet (Gait) 200  -NANO       Row Name 02/26/24 0900          Balance    Balance Assessment standing dynamic balance  -NANO     Dynamic Standing Balance independent  -NANO       Row Name 02/26/24 0900          Plan of Care Review    Plan of Care Reviewed With patient  -NANO     Outcome Evaluation Pt did not demonstrate any safety or mobility deficits during the initial evaluation.  Pt is safe to continue ambulating within their room independently until their discharge from the hospital.  Pt will be discharged from PT services at this time.  -NANO       [facility] Name 02/26/24 0900          Therapy Assessment/Plan (PT)    Criteria for Skilled Interventions Met (PT) no problems identified which require skilled intervention  -NANO     Therapy Frequency (PT) evaluation only  -NANO       Row Name 02/26/24 0900          PT Evaluation Complexity    History, PT Evaluation Complexity no personal factors and/or comorbidities  -NANO     Examination of Body Systems (PT Eval Complexity) total of 4 or more elements  -NANO     Clinical Presentation (PT Evaluation Complexity) stable  -NANO     Clinical Decision Making (PT Evaluation Complexity) low complexity  -NANO     Overall Complexity (PT Evaluation Complexity) low complexity  -NANO       Row Name 02/26/24 0900          Therapy Plan Review/Discharge Plan (PT)    Therapy Plan Review (PT) evaluation/treatment results reviewed;participants voiced agreement with care plan;participants included;patient  -NANO       Row Name 02/26/24 0900          Physical Therapy Goals    Problem Specific Goal  Selection (PT) problem specific goal 1, PT  -NANO       Row Name 02/26/24 0900          Problem Specific Goal 1 (PT)    Problem Specific Goal 1 (PT) Complete PT evaluation  -NANO     Time Frame (Problem Specific Goal 1, PT) 1 day  -NANO     Progress/Outcome (Problem Specific Goal 1, PT) goal met  -NANO               User Key  (r) = Recorded By, (t) = Taken By, (c) = Cosigned By      Initials Name Provider Type    Dario De Jesus PT Physical Therapist                      PT Recommendation and Plan  Anticipated Discharge Disposition (PT): home  Therapy Frequency (PT): evaluation only  Plan of Care Reviewed With: patient  Outcome Evaluation: Pt did not demonstrate any safety or mobility deficits during the initial evaluation.  Pt is safe to continue ambulating within their room independently until their discharge from the hospital.  Pt will be discharged from PT services at this time.   Outcome Measures       Row Name 02/26/24 0900             How much help from another person do you currently need...    Turning from your back to your side while in flat bed without using bedrails? 4  -NANO      Moving from lying on back to sitting on the side of a flat bed without bedrails? 4  -NANO      Moving to and from a bed to a chair (including a wheelchair)? 4  -NANO      Standing up from a chair using your arms (e.g., wheelchair, bedside chair)? 4  -NANO      Climbing 3-5 steps with a railing? 4  -NANO      To walk in hospital room? 4  -NANO      AM-PAC 6 Clicks Score (PT) 24  -NANO      Highest Level of Mobility Goal 8 --> Walked 250 feet or more  -NANO         Functional Assessment    Outcome Measure Options AM-PAC 6 Clicks Basic Mobility (PT)  -NANO                User Key  (r) = Recorded By, (t) = Taken By, (c) = Cosigned By      Initials Name Provider Type    Dario De Jesus PT Physical Therapist                     Time Calculation:    PT Charges       Row Name 02/26/24 0927             Time Calculation    PT Received On 02/26/24  -NANO          Untimed Charges    PT Eval/Re-eval Minutes 20  -NANO         Total Minutes    Untimed Charges Total Minutes 20  -NANO       Total Minutes 20  -NANO                User Key  (r) = Recorded By, (t) = Taken By, (c) = Cosigned By      Initials Name Provider Type    Dario De Jesus, PT Physical Therapist                      PT G-Codes  Outcome Measure Options: AM-PAC 6 Clicks Basic Mobility (PT)  AM-PAC 6 Clicks Score (PT): 24    Dario Nguyen, PT  2/26/2024

## 2024-02-26 NOTE — THERAPY EVALUATION
Acute Care - Speech Language Pathology   Swallow Initial Evaluation  [facility]     Patient Name: Ahsan Moran  : 1980  MRN: [id number]  Today's Date: 2024               Admit Date: 2024    Visit Dx:     ICD-10-CM ICD-9-CM   1. TIA (transient ischemic attack)  G45.9 435.9   2. Hypertension, unspecified type  I10 401.9   3. Difficulty in walking  R26.2 719.7     Patient Active Problem List   Diagnosis    TIA (transient ischemic attack)     Past Medical History:   Diagnosis Date    A-fib     Hypertension      History reviewed. No pertinent surgical history.      Inpatient Speech Pathology Dysphagia Evaluation        PAIN SCALE: not assessed    PRECAUTIONS/CONTRAINDICATIONS:  standard    SUSPECTED ABUSE/NEGLECT/EXPLOITATION:  none noted    SOCIAL/PSYCHOLOGICAL NEEDS/BARRIERS:  none noted    PAST SOCIAL HISTORY:  Patient lives at home, alone. Patient drinks roughly 40 cans of beer per week, patient reports the use of snuff.     PRIOR FUNCTION:  Patient reports no previous swallowing, or speech and language issues.     PATIENT GOALS/EXPECTATIONS:  To eat and drink unrestricted    HISTORY:  Patient is a 44 year old male with a past medical history of hypertension and a-fib. Patient presented to ER after feelings numbness/tingling on the left side, and unable to express words. 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 XR of chest was negative    CURRENT DIET LEVEL:  Regular diet, thin liquids    OBJECTIVE:    TEST ADMINISTERED:  bedside swallow evaluation    COGNITION/SAFETY AWARENESS:  Patient is alert and oriented to environment    BEHAVIORAL OBSERVATIONS:  Patient had a pleasant demeanor and was complaint for bedside swallow evaluation    ORAL MOTOR EXAM:  Labial and lingual strength were within normal limits. No facial asymmetry or lingula  deviation was noted.     VOICE QUALITY:  Adequate     REFLEX EXAM:  not assessed    POSTURE:  90 degrees upright    FEEDING/SWALLOWING FUNCTION:  Assessed patient with thin liquids and solids.     CLINICAL OBSERVATIONS:  Oral phase showed adequate bolus control and preparation with a timely oral transit and oropharyngeal swallow. Thin liquids with cup and straw patient demonstrated consistent throat clears on trials via straw. Voicing remained clear compared to baseline. Solid consistencies, patient reported no sensation of globus. No clinical signs of aspiration on solids.     DYSPHAGIA CRITERIA:  N/A    FUNCTIONAL ASSESSMENT INSTRUMENT: Patient currently scored a level 7 of 7 on Functional Communication Measures for swallowing indicating a 0% limitation in function.    ASSESSMENT/ PLAN OF CARE:  Pt presents with adequate oropharyngeal swallow. No clinical signs of aspiration noted. Speech and language services not needed at this time.     REHAB POTENTIAL:  Pt has good rehab potential.  The following limitations may influence improvement/ length of tx  medical status.    SLP Recommendation and Plan  RECOMMENDATIONS:   1.   DIET: Regular diet, thin liquids    2.  POSITION: 90 degrees uptight     3.  COMPENSATORY STRATEGIES: standard.    Pt/responsible party agrees with plan of care and has been informed of all alternatives, risks and benefits.           EDUCATION  The patient has been educated in the following areas:   Dysphagia (Swallowing Impairment).     Nicole Peña, Speech Therapy Student  2/26/2024

## 2024-02-26 NOTE — PROGRESS NOTES
[facility]   Hospitalist Progress Note  Date: 2024  Patient Name: Ahsan Moran  : 1980  MRN: [id number]  Date of admission: 2024  Room/Bed: 214/1      Subjective   Subjective     Chief Complaint:   Left-sided numbness/tingling.  Aphasia    Summary:  Ahsan Moran is a 44 y.o. male presented to the ED for evaluation of left-sided numbness/tingling, unable to articulate starting approximately 1.5 hours prior to arrival to the ED.  Patient was sitting in the couch when he suddenly noticed tingling sensation/numbness on the left side of his body, unable to express his words.  No focal weakness.  Immediately called EMS and again brought into the ED.  By the time patient has come to the ED symptoms started to improve.  No similar episodes in the past.  In  patient was noted to have some palpitations and went to the ED and was noted to be hypertensive and diagnosed with paroxysmal A-fib.  Not on anticoagulation.  Denies any lightheadedness, dizziness, focal weakness, chest pain, shortness of breath, abdominal pain, diarrhea.     Patient has a long history of alcohol intake, drinks 6-8 beers, up to 6 times a week.  Smokeless tobacco.  Denies using any other recreational drugs.  Last alcohol drink was on Saturday.     In the ED, vital signs temperature 98.2, pulse 112, respiratory rate 14, blood pressure 205/126 on room air saturating at 99%.  Labs showed sodium 132, potassium 3.3, chloride 97, bicarb 16.5, anion gap 18.5, AST/ALT 67/37 total bili 2.1, rest of the CMP with no significant findings, normal CBC.  Chest x-ray showed no acute abnormality.  EKG showed sinus rhythm.  CT head without contrast showed no acute abnormality.  CTP brain inconclusive due to motion artifact., CTA head and neck showed no large vessel occlusion.  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 patient has  been evaluated by teleneurologist recommended admission for further evaluation of TIA.    Interval Followup:   No focal neurological exams.  However on exam patient able to articulate however agreeable to everything asked.  Patient not able to follow commands, however can follow visual cues.  Patient unable to identify objects when pointed to them.  Patient has an MRI and echo pending, neurology continues to follow.    Objective   Objective     Vitals:   Temp:  [98.2 °F (36.8 °C)-98.8 °F (37.1 °C)] 98.8 °F (37.1 °C)  Heart Rate:  [] 94  Resp:  [13-18] 16  BP: (162-216)/(108-153) 173/119    Physical Exam   General: Awake, alert, NAD, making good eye contact  HENT: NCAT, MMM  Eyes: pupils equal, no scleral icterus  Cardiovascular: RRR, no murmurs   Pulmonary: CTA bilaterally; no wheezes; no conversational dyspnea  Gastrointestinal: S/ND/NT, +BS  Skin: No jaundice, no rash on exposed skin appreciated  Neuro: Patient able to speak, appears to have receptive aphasia.  Verbal cues are not followed, patient can follow visual cues following commands.  Pointing to objects in the room patient starts to describe them however is unable to state what the object is.  Patient with no weakness noted.  Denies any neuropathy    Result Review    Result Review:  I have personally reviewed these results:  [x]  Laboratory      Lab 02/26/24  0350 02/26/24  0206   WBC  --  8.71   HEMOGLOBIN  --  16.3   HEMATOCRIT  --  44.1   PLATELETS  --  173   NEUTROS ABS  --  6.58   IMMATURE GRANS (ABS)  --  0.03   LYMPHS ABS  --  1.25   MONOS ABS  --  0.80   EOS ABS  --  0.01   MCV  --  84.5   LACTATE 1.3  --    PROTIME  --  13.0   APTT  --  25.5         Lab 02/26/24  0522 02/26/24  0206   SODIUM  --  132*   POTASSIUM  --  3.3*   CHLORIDE  --  97*   CO2  --  16.5*   ANION GAP  --  18.5*   BUN  --  13   CREATININE  --  0.96   EGFR  --  99.3   GLUCOSE  --  133*   CALCIUM  --  9.2   TSH 4.820*  --          Lab 02/26/24  0206   TOTAL PROTEIN 7.5    ALBUMIN 4.2   GLOBULIN 3.3   ALT (SGPT) 37   AST (SGOT) 67*   BILIRUBIN 2.1*   ALK PHOS 100         Lab 02/26/24  0206   HSTROP T 7   PROTIME 13.0   INR 0.96         Lab 02/26/24  0522   CHOLESTEROL 155   LDL CHOL 43   HDL CHOL 34*   TRIGLYCERIDES 541*             Brief Urine Lab Results  (Last result in the past 365 days)        Color   Clarity   Blood   Leuk Est   Nitrite   Protein   CREAT   Urine HCG        02/26/24 0356 Yellow   Clear   Negative   Trace   Negative   30 mg/dL (1+)                 [x]  Microbiology   Microbiology Results (last 10 days)       ** No results found for the last 240 hours. **          [x]  Radiology  US Liver    Result Date: 2/26/2024    No acute cholecystitis.  No gallstones.  No biliary ductal dilatation.  Diffuse hepatic steatosis is seen with hepatomegaly.  There is suspected splenomegaly.  The other findings are as detailed above.     Please note that portions of this note were completed with a voice recognition program.  MANDA YEPEZ JR, MD       Electronically Signed and Approved By: MANDA YEPEZ JR, MD on 2/26/2024 at 6:46              XR Chest 1 View    Result Date: 2/26/2024   No acute cardiopulmonary disease is seen radiographically.     Please note that portions of this note were completed with a voice recognition program.  MANDA YEPEZ JR, MD       Electronically Signed and Approved By: MANDA YEPEZ JR, MD on 2/26/2024 at 4:20              CT Angiogram Neck    Result Date: 2/26/2024     1. No hemodynamically significant arterial stenoses are seen.   2. No emergent large vessel occlusion.   3. No acute arterial dissection.   4. The vertebral arteries are patent and codominant.      Please note that portions of this note were completed with a voice recognition program.  MANDA YEPEZ JR, MD       Electronically Signed and Approved By: MANDA YEPEZ JR, MD on 2/26/2024 at 4:01              CT Angiogram Head w AI Analysis of LVO    Result Date: 2/26/2024     1. The  study is limited.   2. No definite emergent large vessel occlusion is identified.   3. 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  No other definite cerebral aneurysms are seen.   4. No hemodynamically significant arterial stenoses are identified.  5. Please see above comments for further detail.     Please note that portions of this note were completed with a voice recognition program.  MANDA YEPEZ JR, MD       Electronically Signed and Approved By: MANDA YEPEZ JR, MD on 2/26/2024 at 3:53              CT CEREBRAL PERFUSION WITH & WITHOUT CONTRAST    Result Date: 2/26/2024    The study is nondiagnostic due to the degree of patient motion.     Please note that portions of this note were completed with a voice recognition program.  MANDA YEPEZ JR, MD       Electronically Signed and Approved By: MANDA YEPEZ JR, MD on 2/26/2024 at 3:41              CT Head Without Contrast Stroke Protocol    Result Date: 2/26/2024   No acute brain abnormality is seen. Specifically, no acute intracranial hemorrhage, no acute infarct, no significant intracranial mass lesion, and no acute intracranial mass effect are appreciated.  Please see above comments for further detail.    Please note that portions of this note were completed with a voice recognition program.  MANDA YEPEZ JR, MD       Electronically Signed and Approved By: MANDA YEPEZ JR, MD on 2/26/2024 at 2:31             []  EKG/Telemetry   []  Cardiology/Vascular   []  Pathology  []  Old records  []  Other:    Assessment & Plan   Assessment / Plan     Assessment:  Left-sided numbness/tingling-resolved  Expressive aphasia-waxing and waning, present this morning  Concern for TIA  Hyperbilirubinemia  Paroxysmal A-fib, not on anticoagulation  Hypertension  Chronic alcohol use, at risk for alcohol withdrawal     Plan  Remains admitted to hospital for further  care management  Neurology continues to follow, appreciate assistance  Neurochecks per protocol  MRI brain without contrast, ordered  Cardiac echo, ordered with agitated saline  Permissive hypertension, treat with as needed antihypertensives if her blood pressure is greater than 220/120 or symptomatic. If blood pressure is greater than 220/120 give labetalol PO or IV or Vasotec IV with a goal of 15% reduction in BP during the first 24 hours.  A1c returned at 5.4, LDL 43, TSH slightly elevated at 4.8 with normal T4  Aspirin loading dose followed by aspirin 81 mg daily  Plavix 300 mg loading dose followed by 75 mg daily for 21 days  CIWA protocol  IV thiamine  Folic acid  Check vitamin B12 levels  Heart healthy diet  Monitor LFTs, bilirubin level with a.m. labs.  Repeat CMP ordered.  Right upper quadrant ultrasound shows no cholecystitis, no gallstones, no biliary ductal dilation.  Diffuse hepatic steatosis is seen with hepatomegaly, suspected splenomegaly.  PT OT consult       Discussed with RN.    DVT prophylaxis:  Medical and mechanical DVT prophylaxis orders are present.    CODE STATUS:   Level Of Support Discussed With: Patient  Code Status (Patient has no pulse and is not breathing): CPR (Attempt to Resuscitate)  Medical Interventions (Patient has pulse or is breathing): Full Support

## 2024-02-26 NOTE — THERAPY EVALUATION
Patient Name: Ahsan Moran  : 1980    MRN: [id number]                              Today's Date: 2024       Admit Date: 2024    Visit Dx:     ICD-10-CM ICD-9-CM   1. TIA (transient ischemic attack)  G45.9 435.9   2. Hypertension, unspecified type  I10 401.9   3. Difficulty in walking  R26.2 719.7   4. Decreased activities of daily living (ADL)  Z78.9 V49.89     Patient Active Problem List   Diagnosis    TIA (transient ischemic attack)     Past Medical History:   Diagnosis Date    A-fib     Hypertension      History reviewed. No pertinent surgical history.   General Information       Row Name 24 1103          OT Time and Intention    Document Type evaluation  -ES     Mode of Treatment individual therapy;occupational therapy  -ES       Row Name 24 1103          General Information    Patient Profile Reviewed yes  -ES     Prior Level of Function independent:;ADL's;all household mobility;community mobility  Patient independent with B and IADLs at baseline. Patient is employed. No device for functional mobility. Standing shower and grooming completion. CPAP at night. Patient denies recent falls.  -ES     Existing Precautions/Restrictions no known precautions/restrictions  -ES     Barriers to Rehab none identified  -ES       Row Name 24 1103          Occupational Profile    Reason for Services/Referral (Occupational Profile) Patient is 44 yr old male admitted [facility] on 2024 with reports of left sided numbness and tingling, speech difficulty. OT evaluation and treatment ordered d/t recent decline in ADLs/transfer ability and discharge planning recommendations. No previous OT services for current condition.  -ES       Row Name 24 1103          Living Environment    People in Home alone  -ES       Row Name 24 1103          Cognition    Orientation Status (Cognition) oriented x 4  patient pleasant and cooperative, agreeable to therapy evaluation.  -ES                User Key  (r) = Recorded By, (t) = Taken By, (c) = Cosigned By      Initials Name Provider Type    Nury Vazquez, OTR/L, CSRS Occupational Therapist                     Mobility/ADL's       Row Name 02/26/24 1108          Bed Mobility    Bed Mobility supine-sit;sit-supine  -ES     Supine-Sit [place] (Bed Mobility) independent  -ES     Sit-Supine [place] (Bed Mobility) independent  -ES       Row Name 02/26/24 1108          Transfers    Transfers sit-stand transfer;stand-sit transfer  -ES       Row Name 02/26/24 1108          Sit-Stand Transfer    Sit-Stand [place] (Transfers) independent  -ES     Assistive Device (Sit-Stand Transfers) other (see comments)  no assistive device  -ES       Row Name 02/26/24 1108          Stand-Sit Transfer    Stand-Sit [place]Brien (Transfers) independent  -ES     Assistive Device (Stand-Sit Transfers) other (see comments)  no assistive device  -ES       Row Name 02/26/24 1108          Functional Mobility    Functional Mobility- Ind. Level independent  -ES     Functional Mobility- Device other (see comments)  no assistive device  -ES       Row Name 02/26/24 1108          Activities of Daily Living    BADL Assessment/Intervention bathing;upper body dressing;lower body dressing;grooming;feeding;toileting  -ES       Row Name 02/26/24 1108          Bathing Assessment/Intervention    O[place]Brien Level (Bathing) bathing skills;independent  -ES       Row Name 02/26/24 1108          Upper Body Dressing Assessment/Training    O[place]Brien Level (Upper Body Dressing) upper body dressing skills;independent  -ES       Row Name 02/26/24 1108          Lower Body Dressing Assessment/Training    O[place]Brien Level (Lower Body Dressing) lower body dressing skills;independent  -ES       Row Name 02/26/24 1108          Grooming Assessment/Training    [place]Brien Level (Grooming) grooming skills;independent  -ES       Row Name 02/26/24 1108          Self-Feeding  Assessment/Training    [place] Level (Feeding) feeding skills;independent  -ES       Row Name 02/26/24 1108          Toileting Assessment/Training    [place] Level (Toileting) toileting skills;independent  -ES               User Key  (r) = Recorded By, (t) = Taken By, (c) = Cosigned By      Initials Name Provider Type    ES Nury Holder, OTR/L, CSRS Occupational Therapist                   Obj/Interventions       Row Name 02/26/24 1111          Sensory Assessment (Somatosensory)    Sensory Assessment (Somatosensory) sensation intact  -ES     Sensory Assessment bilateral upper and lower extremity sensation intact to light and heavy touch. No sensory discrimination deficits noted at time of evaluation  -ES       Row Name 02/26/24 1111          Vision Assessment/Intervention    Visual Impairment/Limitations blurry vision  -ES     Vision Assessment Comment patient does endorse blurry vision. Quick vision screen intact to all four visual quadrants with scanning and tracking. No visual deficits present at time of initial evaluation.  -ES       Row Name 02/26/24 1111          Range of Motion Comprehensive    General Range of Motion no range of motion deficits identified  -ES       Row Name 02/26/24 1111          Strength Comprehensive (MMT)    General Manual Muscle Testing (MMT) Assessment no strength deficits identified  -ES     Comment, General Manual Muscle Testing (MMT) Assessment BUEs WFL  -ES       Row Name 02/26/24 1111          Motor Skills    Motor Skills functional endurance  -ES     Functional Endurance good  -ES       Row Name 02/26/24 1111          Balance    Balance Assessment sitting dynamic balance;standing dynamic balance  -ES     Dynamic Sitting Balance independent  -ES     Position, Sitting Balance unsupported  -ES     Dynamic Standing Balance independent  -ES     Position/Device Used, Standing Balance unsupported  -ES               User Key  (r) = Recorded By, (t) = Taken By, (c) = Cosigned  By      Initials Name Provider Type    Nury Vazquez, OTR/L, KRISTOPHERS Occupational Therapist                   Goals/Plan    No documentation.                  Clinical Impression       Row Name 02/26/24 1116          Plan of Care Review    Plan of Care Reviewed With patient  -ES     Outcome Evaluation Patient presents at or near baseline functional status at time of evaluation with no identified functional deficits that impede patient independence with activities of daily living.  No indicated need for skilled occupational therapy intervention in the acute care setting.  Occupational therapy will sign off at this time.  -ES       Row Name 02/26/24 1116          Therapy Assessment/Plan (OT)    Criteria for Skilled Therapeutic Interventions Met (OT) no problems identified which require skilled intervention  -ES     Therapy Frequency (OT) evaluation only  -ES       Row Name 02/26/24 1116          Therapy Plan Review/Discharge Plan (OT)    Anticipated Discharge Disposition (OT) home  -ES               User Key  (r) = Recorded By, (t) = Taken By, (c) = Cosigned By      Initials Name Provider Type    Nury Vazquez, OTR/L, CSRS Occupational Therapist                   Outcome Measures       Row Name 02/26/24 1116          How much help from another is currently needed...    Putting on and taking off regular lower body clothing? 4  -ES     Bathing (including washing, rinsing, and drying) 4  -ES     Toileting (which includes using toilet bed pan or urinal) 4  -ES     Putting on and taking off regular upper body clothing 4  -ES     Taking care of personal grooming (such as brushing teeth) 4  -ES     Eating meals 4  -ES     AM-PAC 6 Clicks Score (OT) 24  -ES       Row Name 02/26/24 0900 02/26/24 0710       How much help from another person do you currently need...    Turning from your back to your side while in flat bed without using bedrails? 4  -NANO 4  -GB    Moving from lying on back to sitting on the side of a flat bed  without bedrails? 4  -NANO 4  -GB    Moving to and from a bed to a chair (including a wheelchair)? 4  -NANO 4  -GB    Standing up from a chair using your arms (e.g., wheelchair, bedside chair)? 4  -NANO 4  -GB    Climbing 3-5 steps with a railing? 4  -NANO 4  -GB    To walk in hospital room? 4  -NANO 4  -GB    AM-PAC 6 Clicks Score (PT) 24  -NANO 24  -GB    Highest Level of Mobility Goal 8 --> Walked 250 feet or more  -NANO 8 --> Walked 250 feet or more  -GB      Row Name 02/26/24 0529          How much help from another person do you currently need...    Turning from your back to your side while in flat bed without using bedrails? 4  -JG     Moving from lying on back to sitting on the side of a flat bed without bedrails? 4  -JG     Moving to and from a bed to a chair (including a wheelchair)? 4  -JG     Standing up from a chair using your arms (e.g., wheelchair, bedside chair)? 4  -JG     Climbing 3-5 steps with a railing? 4  -JG     To walk in hospital room? 4  -JG     AM-PAC 6 Clicks Score (PT) 24  -JG     Highest Level of Mobility Goal 8 --> Walked 250 feet or more  -JG       Row Name 02/26/24 1116 02/26/24 0900       Functional Assessment    Outcome Measure Options AM-PAC 6 Clicks Daily Activity (OT)  -ES AM-PAC 6 Clicks Basic Mobility (PT)  -NANO              User Key  (r) = Recorded By, (t) = Taken By, (c) = Cosigned By      Initials Name Provider Type    Iman Thomas, RN Registered Nurse    Kenisha Parisi, RN Registered Nurse    Dario De Jesus, PT Physical Therapist    Nury Vazquez, OTR/L, CSRS Occupational Therapist                      OT Recommendation and Plan  Therapy Frequency (OT): evaluation only  Plan of Care Review  Plan of Care Reviewed With: patient  Outcome Evaluation: Patient presents at or near baseline functional status at time of evaluation with no identified functional deficits that impede patient independence with activities of daily living.  No indicated need for skilled occupational  therapy intervention in the acute care setting.  Occupational therapy will sign off at this time.     Time Calculation:   Evaluation Complexity (OT)  Review Occupational Profile/Medical/Therapy History Complexity: brief/low complexity  Assessment, Occupational Performance/Identification of Deficit Complexity: 1-3 performance deficits  Clinical Decision Making Complexity (OT): problem focused assessment/low complexity  Overall Complexity of Evaluation (OT): low complexity     Time Calculation- OT       Row Name 02/26/24 1117             Time Calculation- OT    OT Received On 02/26/24  -ES         Untimed Charges    OT Eval/Re-eval Minutes 31  -ES         Total Minutes    Untimed Charges Total Minutes 31  -ES       Total Minutes 31  -ES                User Key  (r) = Recorded By, (t) = Taken By, (c) = Cosigned By      Initials Name Provider Type    ES Nury Holder, OTR/L, CSRS Occupational Therapist                  Therapy Charges for Today       Code Description Service Date Service Provider Modifiers Qty    71141009193 HC OT EVAL LOW COMPLEXITY 3 2/26/2024 Nury Holder, OTR/L, CSRS GO 1                 Nury Holder OTR/L, CSRS  2/26/2024

## 2024-02-26 NOTE — ED PROVIDER NOTES
Time: 2:38 AM EST  Date of encounter:  2/26/2024  Independent Historian/Clinical History and Information was obtained by:   Patient    History is limited by: N/A    Chief Complaint: AMS, Numbness      History of Present Illness:  Patient is a 44 y.o. year old male who presents to the emergency department for evaluation of Altered mental status, word finding difficulty and left-sided numbness.  He denies any weakness.  Symptoms started approximate hour and a half ago.  Patient states he was sitting on the couch at onset of symptoms.  Numbness is since resolved.  He states he does have a history of intermittent atrial fibrillation.  He is not on any anticoagulation at this time.    HPI    Patient Care Team  Primary Care Provider: Provider, No Known    Past Medical History:     No Known Allergies  Past Medical History:   Diagnosis Date    A-fib     Hypertension      History reviewed. No pertinent surgical history.  History reviewed. No pertinent family history.    Home Medications:  Prior to Admission medications    Not on File        Social History:   Social History     Tobacco Use    Smoking status: Never    Smokeless tobacco: Current     Types: Snuff   Vaping Use    Vaping Use: Never used   Substance Use Topics    Alcohol use: Yes     Alcohol/week: 40.0 standard drinks of alcohol     Types: 40 Cans of beer per week    Drug use: Not Currently         Review of Systems:  Review of Systems   Constitutional:  Negative for chills and fever.   HENT:  Negative for congestion, ear pain and sore throat.    Eyes:  Negative for pain.   Respiratory:  Negative for cough, chest tightness and shortness of breath.    Cardiovascular:  Negative for chest pain.   Gastrointestinal:  Negative for abdominal pain, diarrhea, nausea and vomiting.   Genitourinary:  Negative for flank pain and hematuria.   Musculoskeletal:  Negative for joint swelling.   Skin:  Negative for pallor.   Neurological:  Positive for speech difficulty and numbness.  "Negative for seizures and headaches.   Psychiatric/Behavioral:  Positive for confusion.    All other systems reviewed and are negative.       Physical Exam:  BP (!) 162/111 (Patient Position: Lying)   Pulse 93   Temp 98.2 °F (36.8 °C) (Oral)   Resp 13   Ht 182.9 cm (72\")   Wt 121 kg (267 lb 3.2 oz)   SpO2 95%   BMI 36.24 kg/m²     Physical Exam  Constitutional:       Appearance: Normal appearance.   HENT:      Head: Normocephalic and atraumatic.      Nose: Nose normal.      Mouth/Throat:      Mouth: Mucous membranes are moist.   Eyes:      Extraocular Movements: Extraocular movements intact.      Conjunctiva/sclera: Conjunctivae normal.      Pupils: Pupils are equal, round, and reactive to light.   Cardiovascular:      Rate and Rhythm: Normal rate and regular rhythm.      Pulses: Normal pulses.      Heart sounds: Normal heart sounds.   Pulmonary:      Effort: Pulmonary effort is normal.      Breath sounds: Normal breath sounds.   Abdominal:      General: There is no distension.      Palpations: Abdomen is soft.      Tenderness: There is no abdominal tenderness.   Musculoskeletal:         General: Normal range of motion.      Cervical back: Normal range of motion.   Skin:     General: Skin is warm and dry.      Capillary Refill: Capillary refill takes less than 2 seconds.   Neurological:      General: No focal deficit present.      Mental Status: He is alert and oriented to person, place, and time. Mental status is at baseline.   Psychiatric:         Mood and Affect: Mood normal.         Behavior: Behavior normal.                  Procedures:  Procedures      Medical Decision Making:      Comorbidities that affect care:    Atrial Fibrillation, Hypertension    External Notes reviewed:    None      The following orders were placed and all results were independently analyzed by me:  Orders Placed This Encounter   Procedures    CT Head Without Contrast Stroke Protocol    CT Angiogram Head w AI Analysis of LVO    " CT Angiogram Neck    CT CEREBRAL PERFUSION WITH & WITHOUT CONTRAST    XR Chest 1 View    MRI Brain Without Contrast    US Abdomen Complete    [place] Draw    Comprehensive Metabolic Panel    Protime-INR    aPTT    Single High Sensitivity Troponin T    Urinalysis With Microscopic If Indicated (No Culture) - Urine, Clean Catch    CBC Auto Differential    Lactic Acid, Plasma    Hemoglobin A1c    Lipid Panel    Urinalysis, Microscopic Only - Urine, Clean Catch    Diet: Cardiac Diets; Healthy Heart (2-3 Na+); Texture: Regular Texture (IDDSI 7); Fluid Consistency: Thin (IDDSI 0)    Initiate Department's Acute Stroke Process (Team D, Code 19, etc)    Perform NIH Stroke Scale    Measure Actual Weight    Head of Bed 30 Degrees or Less    Undress and Gown    Vital Signs    Neuro Checks    Notify MD for SBP < 80 or > 200    Notify Provider for SBP greater than 140 if hemorrhagic Stroke    No Hypotonic Fluids    Nursing Dysphagia Screening (Complete Prior to Giving anything PO)    RN to Place Order SLP Consult (IF swallow screen failed) - Eval & Treat Choosing Reason of RN Dysphagia Screen Failed    Nursing Dysphagia Screening (Complete Prior to Giving Anything By Mouth)    Vital Signs    Telemetry - Place Orders & Notify Provider of Results When Patient Experiences Acute Chest Pain, Dysrhythmia or Respiratory Distress    Notify Provider    Nursing Dysphagia Screening (Complete Prior to Giving Anything By Mouth)    RN to Place Order SLP Consult - Eval & Treat Choosing Reason of RN Dysphagia Screen Failed    Nurse to Call MD or Nutrition Services for Diet if Patient Passes Dysphagia Screen    Intake and Output    Neuro Checks    NIHSS Assessment    Order CT Head Without Contrast for Neurological Decline    Provide Stroke Education Material    Saline Lock & Maintain IV Access    Place Sequential Compression Device    Maintain Sequential Compression Device    Activity - Strict Bed Rest with HOB Flat    Vital Signs    Continuous  Pulse Oximetry    Obtain Baseline Clinical [place] Withdrawal Assessment - Ar (CIWA-Ar), Sedation Scale & Vital Signs    Clinical [place] Withdrawal Assessment (CIWA-Ar)    If CIWA-Ar Score Less Than 8 For 3 Consecutive Assessments, Monitor Every 4 Hours & Discontinue Assessment When CIWA-Ar Less Than 8 for 24 Hours    Obtain Pre & Post Sedation Scores With Every Lorazepam Dose - Hold For POSS Greater Than 2 or RASS of -3 or Less    Notify Provider - Withdrawal    Notify Provider of Abnormal Lab Results    Notify Provider - Vitals    Code Status and Medical Interventions:    Inpatient Hospitalist Consult    Inpatient Case Management  Consult    Inpatient Diabetes Educator Consult    Inpatient Neurology Consult Stroke    OT Consult: Eval & Treat    PT Consult: Eval & Treat As Tolerated    Oxygen Therapy- Nasal Cannula; Titrate 1-6 LPM Per SpO2; 90 - 95%    SLP Consult: Eval & Treat Communication Disorder    POC Glucose Once    POC Glucose Once    POC Glucose Q6H    POC Glucose Once    ECG 12 Lead ED Triage Standing Order; Acute Stroke (Onset <12 hrs)    Adult Transthoracic Echo Complete W/ Cont if Necessary Per Protocol (With Agitated Saline)    Insert Large Bore Peripheral IV - Right AC Preferred    Insert Peripheral IV    Inpatient Admission    Seizure Precautions    Safety Precautions    CBC & Differential    Green Top (Gel)    Lavender Top    Gold Top - SST    Light Blue Top    Extra Tubes    Lavender Top       Medications Given in the Emergency Department:  Medications   sodium chloride 0.9 % flush 10 mL (has no administration in time range)   sodium chloride 0.9 % flush 10 mL (has no administration in time range)   sodium chloride 0.9 % flush 10 mL (10 mL Intravenous Given 2/26/24 0516)   sodium chloride 0.9 % infusion 40 mL (has no administration in time range)   atorvastatin (LIPITOR) tablet 80 mg (has no administration in time range)   aspirin chewable tablet 81 mg (has no administration  in time range)     Or   aspirin suppository 300 mg (has no administration in time range)   clopidogrel (PLAVIX) tablet 300 mg (300 mg Oral Given 2/26/24 0505)     And   clopidogrel (PLAVIX) tablet 75 mg (has no administration in time range)   thiamine (B-1) injection 200 mg (200 mg Intravenous Given 2/26/24 0515)     Followed by   thiamine (VITAMIN B-1) tablet 100 mg (has no administration in time range)   folic acid (FOLVITE) tablet 1 mg (has no administration in time range)   LORazepam (ATIVAN) tablet 1 mg (has no administration in time range)     Or   diazePAM (VALIUM) injection 2.5 mg (has no administration in time range)     Or   LORazepam (ATIVAN) tablet 2 mg (has no administration in time range)     Or   diazePAM (VALIUM) injection 5 mg (has no administration in time range)     Or   diazePAM (VALIUM) injection 5 mg (has no administration in time range)     Or   LORazepam (ATIVAN) tablet 4 mg (has no administration in time range)     Or   diazePAM (VALIUM) injection 10 mg (has no administration in time range)   pantoprazole (PROTONIX) EC tablet 40 mg (40 mg Oral Given 2/26/24 0515)   heparin (porcine) 5000 UNIT/ML injection 5,000 Units (5,000 Units Subcutaneous Given 2/26/24 0515)   iopamidol (ISOVUE-370) 76 % injection 150 mL (150 mL Intravenous Given 2/26/24 0228)   ondansetron (ZOFRAN) injection 4 mg (4 mg Intravenous Given 2/26/24 0310)   labetalol (NORMODYNE,TRANDATE) injection 10 mg (10 mg Intravenous Given 2/26/24 0230)   potassium chloride (MICRO-K/KLOR-CON) CR capsule (40 mEq Oral Given 2/26/24 0346)   aspirin EC tablet 325 mg (325 mg Oral Given 2/26/24 0509)        ED Course:         Labs:    Lab Results (last 24 hours)       Procedure Component Value Units Date/Time    CBC & Differential [[id number]]  (Abnormal) Collected: 02/26/24 0206    Specimen: Blood Updated: 02/26/24 0244    Narrative:      The following orders were created for panel order CBC & Differential.  Procedure                                Abnormality         Status                     ---------                               -----------         ------                     CBC Auto Differential[[id number]]        Abnormal            Final result                 Please view results for these tests on the individual orders.    Comprehensive Metabolic Panel [[id number]]  (Abnormal) Collected: 02/26/24 0206    Specimen: Blood Updated: 02/26/24 0301     Glucose 133 mg/dL      BUN 13 mg/dL      Creatinine 0.96 mg/dL      Sodium 132 mmol/L      Potassium 3.3 mmol/L      Comment: Slight hemolysis detected by analyzer. Result may be falsely elevated.        Chloride 97 mmol/L      CO2 16.5 mmol/L      Calcium 9.2 mg/dL      Total Protein 7.5 g/dL      Albumin 4.2 g/dL      ALT (SGPT) 37 U/L      AST (SGOT) 67 U/L      Comment: Slight hemolysis detected by analyzer. Result may be falsely elevated.        Alkaline Phosphatase 100 U/L      Total Bilirubin 2.1 mg/dL      Globulin 3.3 gm/dL      A/G Ratio 1.3 g/dL      BUN/Creatinine Ratio 13.5     Anion Gap 18.5 mmol/L      eGFR 99.3 mL/min/1.73     Narrative:      GFR Normal >60  Chronic Kidney Disease <60  Kidney Failure <15      Protime-INR [[id number]]  (Normal) Collected: 02/26/24 0206    Specimen: Blood Updated: 02/26/24 0221     Protime 13.0 Seconds      INR 0.96    Narrative:      Suggested Therapeutic Ranges For Oral Anticoagulant Therapy:  Level of Therapy                      INR Target Range  Standard Dose                            2.0-3.0  High Dose                                2.5-3.5  Patients not receiving anticoagulant  Therapy Normal Range                     0.86-1.15    aPTT [[id number]]  (Normal) Collected: 02/26/24 0206    Specimen: Blood Updated: 02/26/24 0221     PTT 25.5 seconds     Single High Sensitivity Troponin T [[id number]]  (Normal) Collected: 02/26/24 0206    Specimen: Blood Updated: 02/26/24 0257     HS Troponin T 7 ng/L     Narrative:      High Sensitive Troponin T Reference  Range:  <14.0 ng/L- Negative Female for AMI  <22.0 ng/L- Negative Male for AMI  >=14 - Abnormal Female indicating possible myocardial injury.  >=22 - Abnormal Male indicating possible myocardial injury.   Clinicians would have to utilize clinical acumen, EKG, Troponin, and serial changes to determine if it is an Acute Myocardial Infarction or myocardial injury due to an underlying chronic condition.         CBC Auto Differential [[id number]]  (Abnormal) Collected: 02/26/24 0206    Specimen: Blood Updated: 02/26/24 0244     WBC 8.71 10*3/mm3      RBC 5.22 10*6/mm3      Hemoglobin 16.3 g/dL      Hematocrit 44.1 %      MCV 84.5 fL      MCH 31.2 pg      MCHC 37.0 g/dL      RDW 11.5 %      RDW-SD 34.8 fl      MPV 10.0 fL      Platelets 173 10*3/mm3      Neutrophil % 75.5 %      Lymphocyte % 14.4 %      Monocyte % 9.2 %      Eosinophil % 0.1 %      Basophil % 0.5 %      Immature Grans % 0.3 %      Neutrophils, Absolute 6.58 10*3/mm3      Lymphocytes, Absolute 1.25 10*3/mm3      Monocytes, Absolute 0.80 10*3/mm3      Eosinophils, Absolute 0.01 10*3/mm3      Basophils, Absolute 0.04 10*3/mm3      Immature Grans, Absolute 0.03 10*3/mm3      nRBC 0.0 /100 WBC     Lactic Acid, Plasma [[id number]]  (Normal) Collected: 02/26/24 0350    Specimen: Blood Updated: 02/26/24 0412     Lactate 1.3 mmol/L     POC Glucose Once [[id number]]  (Abnormal) Collected: 02/26/24 0354    Specimen: Blood Updated: 02/26/24 0356     Glucose 110 mg/dL      Comment: Serial Number: [id number]:  [id number]       Urinalysis With Microscopic If Indicated (No Culture) - Urine, Clean Catch [[id number]]  (Abnormal) Collected: 02/26/24 0356    Specimen: Urine, Clean Catch Updated: 02/26/24 0412     Color, UA Yellow     Appearance, UA Clear     pH, UA >=9.0     Specific Gravity, UA >1.030     Glucose, UA Negative     Ketones, UA Trace     Bilirubin, UA Negative     Blood, UA Negative     Protein, UA 30 mg/dL (1+)     Leuk Esterase, UA Trace     Nitrite, UA  Negative     Urobilinogen, UA 1.0 E.U./dL    Urinalysis, Microscopic Only - Urine, Clean Catch [[id number]]  (Abnormal) Collected: 02/26/24 0356    Specimen: Urine, Clean Catch Updated: 02/26/24 0414     RBC, UA 3-5 /HPF      WBC, UA 11-20 /HPF      Bacteria, UA None Seen /HPF      Squamous Epithelial Cells, UA 0-2 /HPF      Hyaline Casts, UA 3-6 /LPF      Methodology Automated Microscopy    Hemoglobin A1c [[id number]] Collected: 02/26/24 0522    Specimen: Blood Updated: 02/26/24 0533    Lipid Panel [[id number]] Collected: 02/26/24 0522    Specimen: Blood Updated: 02/26/24 0533    POC Glucose Once [[id number]]  (Abnormal) Collected: 02/26/24 0524    Specimen: Blood Updated: 02/26/24 0526     Glucose 138 mg/dL      Comment: Serial Number: [id number]:  [id number]                Imaging:    XR Chest 1 View    Result Date: 2/26/2024  PROCEDURE: XR CHEST 1 VW  COMPARISON: None.  INDICATIONS: Acute Stroke Protocol (Onset < 12 hrs).  FINDINGS: A single frontal (AP or PA upright portable) chest radiograph reveals no cardiac enlargement and no acute infiltrate. No pneumothorax is seen.  External artifacts obscure detail.  The imaged airway is patent and midline.       No acute cardiopulmonary disease is seen radiographically.     Please note that portions of this note were completed with a voice recognition program.  MANDA YEPEZ JR, MD       Electronically Signed and Approved By: MANDA YEPEZ JR, MD on 2/26/2024 at 4:20              CT Angiogram Neck    Result Date: 2/26/2024  PROCEDURE: CT ANGIOGRAM NECK  COMPARISON: 2/26/2024.  INDICATIONS: Neuro. deficit, acute; possible acute ischemic stroke (AIS).  PROTOCOL:   Standard CTA imaging protocol performed.    RADIATION:   Total DLP: 812.7 mGy*cm.   Automated exposure control was utilized to minimize radiation dose. CONTRAST: 80 mL Isovue 370 I.V. LABS:   Not provided. RAPID: CTA imaging was analyzed using RAPID AI to enable computer assisted triage notification to  rapidly detect a large vessel occlusion (LVO) and shorten notification time.  TECHNIQUE:  After obtaining the patient's consent, 909 CT/CTA images of the neck were obtained with intravenous contrast.  Multi-planar/3-D imaging was created and interpreted to optimize visualization of arterial anatomy.  Unless otherwise stated in this report, all arterial luminal stenoses involving the internal carotid arteries are derived by dividing the lesion diameter by the diameter of the normal internal carotid artery more distally (that is, using NASCET criteria).  Despite best efforts, poor image quality limits interpretation of the study, especially due to a limited contrast bolus within the intracranial arteries.  There is also motion artifact on the study.  Also, the study is limited in that there is incomplete visualization of the aortic arch.  Also, the origins of the great arteries are not completely visualized.  FINDINGS:   LEFT INTERNAL CAROTID: No hemodynamically significant stenosis or dissection.  EXTERNAL CAROTID: No hemodynamically significant stenosis or dissection.  COMMON CAROTID: No hemodynamically significant stenosis or dissection.  VERTEBRAL: No hemodynamically significant stenosis or dissection.  The vertebral arteries are probably codominant.  RIGHT INTERNAL CAROTID: No hemodynamically significant stenosis or dissection.  EXTERNAL CAROTID: No hemodynamically significant stenosis or dissection.  COMMON CAROTID: No hemodynamically significant stenosis or dissection.  VERTEBRAL: No hemodynamically significant stenosis or dissection.  The vertebral arteries are probably codominant.  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  Disc and endplate degenerative changes are present at C4-5.  To a lesser extent, degenerative changes are seen elsewhere within the cervical spine.  Streak artifact from dental amalgam obscures detail on the exam.  There is venous contamination on the study, especially on the left,  obscuring the left subclavian artery.  Again, the study is motion-limited.  Nonspecific small to moderate sized bilateral cervical lymph nodes are appreciated.         1. No hemodynamically significant arterial stenoses are seen.   2. No emergent large vessel occlusion.   3. No acute arterial dissection.   4. The vertebral arteries are patent and codominant.      Please note that portions of this note were completed with a voice recognition program.  MANDA YEPEZ JR, MD       Electronically Signed and Approved By: MANDA YEPEZ JR, MD on 2/26/2024 at 4:01              CT Angiogram Head w AI Analysis of LVO    Result Date: 2/26/2024  PROCEDURE: CT ANGIOGRAM HEAD W AI ANALYSIS OF LVO  COMPARISON: 2/26/2024.  INDICATIONS: Neuro.  deficit, acute; possible acute ischemic stroke (AIS).  PROTOCOL:   Standard imaging protocol performed    RADIATION:   DLP: 812.7 mGy*cm   Automated exposure control was utilized to minimize radiation dose. CONTRAST: 80 cc Isovue 370 I.V. LABS:   Not provided. RAPID: CTA imaging was analyzed using RAPID AI to enable computer assisted triage notification to rapidly detect a large vessel occlusion (LVO) and shorten notification time.  TECHNIQUE:  After obtaining the patient's consent, 909 CT/CTA images of the head were obtained with non-ionic contrast, and multi-planar/3-D imaging were created and interpreted to optimize visualization of arterial anatomy.  Despite best efforts, poor image quality limits interpretation of the study, especially due to a limited contrast bolus within the intracranial arteries.  There is also motion artifact on the study.  FINDINGS:  Again, the study is limited.  Grossly, no emergent large vessel occlusion is appreciated.  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  It is also seen on image 1 of series 1002 and image 3 of series 1001 and adjacent images.  No  other definite cerebral aneurysms are seen.  The bulbous appearance of the terminus of the basilar artery is thought to represent a normal variant rather than to represent an infundibulum or basilar tip aneurysm.  The A1 segment on the right is hypoplastic, which is thought be a normal variant.  The intradural (V4 segment) right vertebral artery is minimally larger in caliber than the intradural left vertebral artery.  No hemodynamically significant arterial stenoses are suggested.  The posterior communicating arteries are not well seen and may be hypoplastic.  The right posterior inferior cerebellar artery (PICA) is not well seen.  There may be a dominant right-sided anterior inferior cerebellar artery (AICA) and a dominant left-sided PICA, as with a normal variant.  Please see the nonenhanced head CT exam report from earlier during the same day for further detail regarding additional intracranial findings.          1. The study is limited.   2. No definite emergent large vessel occlusion is identified.   3. 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  No other definite cerebral aneurysms are seen.   4. No hemodynamically significant arterial stenoses are identified.  5. Please see above comments for further detail.     Please note that portions of this note were completed with a voice recognition program.  MANDA YEPEZ JR, MD       Electronically Signed and Approved By: MANDA YEPEZ JR, MD on 2/26/2024 at 3:53              CT CEREBRAL PERFUSION WITH & WITHOUT CONTRAST    Result Date: 2/26/2024  PROCEDURE: CT CEREBRAL PERFUSION W WO CONTRAST  COMPARISONS: 2/26/2024.  INDICATIONS: Neuro. deficit, acute, stroke suspected.  PROTOCOL:   Standard CTP imaging protocol performed.    RADIATION:   Total DLP: 1301.4 mGy*cm.   Automated exposure control was utilized to minimize radiation dose. CONTRAST: Not provided  (? 80 mL). LABS:   Not provided.  TECHNIQUE:  CT perfusion (CTP) was performed, utilizing a total of 80 mL of nonionic intravenous contrast agent (as indicated above) with an injection rate of 5 mL/s.  A total of 8 cm of brain coverage (acquired in two 4 cm contiguous labs) was used for the CTP study.  The images were processed using [employer] software (by [employer] [place], [place]).  The CTP maps, including the cerebral blood volume (CBV), cerebral blood flow (CBF), mean transit time (MTT), time to maximum (Tmax), and time to peak (TTP), were reviewed as well as the summary map(s).  The examination appears to be technically adequate.  There arterial input function (AIF) and the venous output function (VOF) appear to be appropriately placed.  Despite best efforts, poor image quality limits interpretation of the study especially related to motion artifact.  FINDINGS:  The study is nondiagnostic due to the degree of patient motion.        The study is nondiagnostic due to the degree of patient motion.     Please note that portions of this note were completed with a voice recognition program.  MANDA YEPEZ JR, MD       Electronically Signed and Approved By: MANDA YEPEZ JR, MD on 2/26/2024 at 3:41              CT Head Without Contrast Stroke Protocol    Result Date: 2/26/2024  PROCEDURE: CT HEAD WO CONTRAST STROKE PROTOCOL  COMPARISON: None.  INDICATIONS: Neuro deficit, acute, stroke suspected.  PROTOCOL:   Standard CT imaging protocol performed.    RADIATION:   Total DLP: 1,081.2 mGy*cm.   MA and/or KV were/was adjusted to minimize radiation dose.    TECHNIQUE: After obtaining the patient's consent, 528 multi-planar CT images were obtained without non-ionic intravenous contrast material.  DISCUSSION:   A routine nonenhanced head CT was performed.  No acute brain abnormality is identified.  No acute intracranial hemorrhage.  No acute infarct is seen.  No acute skull fracture.  No midline shift or acute  intracranial mass effect is seen.  The ventricular size and configuration are within normal limits.  Diffuse prominence of the extra-axial spaces is seen, suggesting global atrophy for the patient's age.  Chronic bilateral nasal bone fractures are suggested.  There is suspected chronic sinus disease, especially involving the right maxillary antrum.  The imaged middle ear clefts (that is, the bilateral mastoid air cell complexes, bilateral middle ear cavities, and bilateral external auditory canals) are well aerated.  No definite acute orbital findings are suggested.  Probably benign retrocerebellar cystic structures are seen, likely related to arachnoid cysts, roughly estimated at 3.9 x 2.8 x 3.3 cm in transverse, anteroposterior (AP), and craniocaudal extent, respectively, as seen on image 28 of series 201 and image 49 of series 203 and adjacent images.       No acute brain abnormality is seen. Specifically, no acute intracranial hemorrhage, no acute infarct, no significant intracranial mass lesion, and no acute intracranial mass effect are appreciated.  Please see above comments for further detail.    Please note that portions of this note were completed with a voice recognition program.  MANDA YEPEZ JR, MD       Electronically Signed and Approved By: MANDA YEPEZ JR, MD on 2/26/2024 at 2:31                 Differential Diagnosis and Discussion:    Stroke: Differential diagnosis in this patient with signs of possible ischemic stroke include TIA or ischemic stroke, hemorrhagic stroke, hypoglycemic episode, toxic or metabolic encephalopathy, paresthesias.    All labs were reviewed and interpreted by me.  All X-rays impressions were independently interpreted by me.  EKG was interpreted by me.  CT scan radiology impression was interpreted by me.    MDM  Number of Diagnoses or Management Options  Diagnosis management comments: Patient is afebrile nontoxic-appearing.  Vital signs remarkable for hypertension.  No  focal deficits on exam.  CT of his head showed no acute finding.  Patient was evaluated with teleneurology who recommended admission for MRI.  Patient was discussed with hospitalist and will be admitted for further care       Amount and/or Complexity of Data Reviewed  Clinical lab tests: reviewed  Tests in the radiology section of CPT®: reviewed  Review and summarize past medical records: yes  Independent visualization of images, tracings, or specimens: yes    Risk of Complications, Morbidity, and/or Mortality  Presenting problems: moderate  Management options: moderate                 Patient Care Considerations:    SEPSIS was considered but is NOT present in the emergency department as SIRS criteria is not present.      Consultants/Shared Management Plan:    Hospitalist: I have discussed the case with Dr Kemp who agrees to accept the patient for admission.    Social Determinants of Health:    Patient is independent, reliable, and has access to care.       Disposition and Care Coordination:    Admit:   Through independent evaluation of the patient's history, physical, and imperical data, the patient meets criteria for inpatient admission to the hospital.        Final diagnoses:   TIA (transient ischemic attack)   Hypertension, unspecified type        ED Disposition       ED Disposition   Decision to Admit    Condition   --    Comment   Level of Care: Telemetry [5]   Diagnosis: TIA (transient ischemic attack) [180351]   Bed Request Comments: stroke eval   Certification: I Certify That Inpatient Hospital Services Are Medically Necessary For Greater Than 2 Midnights                 This medical record created using voice recognition software.             Gregoria Olivera MD  02/26/24 0530

## 2024-02-26 NOTE — PLAN OF CARE
Goal Outcome Evaluation:  Plan of Care Reviewed With: patient           Outcome Evaluation: Patient presents at or near baseline functional status at time of evaluation with no identified functional deficits that impede patient independence with activities of daily living.  No indicated need for skilled occupational therapy intervention in the acute care setting.  Occupational therapy will sign off at this time.      Anticipated Discharge Disposition (OT): home

## 2024-02-26 NOTE — CASE MANAGEMENT/SOCIAL WORK
Discharge Planning Assessment  [facility]     Patient Name: Ahsan Moran  MRN: [id number]  Today's Date: 2/26/2024    Admit Date: 2/26/2024    Plan: Pt lives alone. Pt has good support from friends. [facility] Pt works FT, Pt denies any fin. stressors or need for DME at this time. Pt states he has transporation home if not he will use a cab. SW will continue to follow for needs.   Discharge Needs Assessment       Row Name 02/26/24 1049       Living Environment    People in Home alone    Current Living Arrangements home    Potentially Unsafe Housing Conditions none    In the past 12 months has the electric, gas, oil, or water company threatened to shut off services in your home? No    Primary Care Provided by self    Provides Primary Care For no one    Quality of Family Relationships helpful;involved    Able to Return to Prior Arrangements yes       Resource/Environmental Concerns    Resource/Environmental Concerns none    Transportation Concerns none       Transportation Needs    In the past 12 months, has lack of transportation kept you from medical appointments or from getting medications? no    In the past 12 months, has lack of transportation kept you from meetings, work, or from getting things needed for daily living? No       Food Insecurity    Within the past 12 months, you worried that your food would run out before you got the money to buy more. Never true    Within the past 12 months, the food you bought just didn't last and you didn't have money to get more. Never true       Transition Planning    Patient/Family Anticipates Transition to home    Patient/Family Anticipated Services at Transition none    Transportation Anticipated car, drives self;family or friend will provide       Discharge Needs Assessment    Readmission Within the Last 30 Days no previous admission in last 30 days    Equipment Currently Used at Home none    Concerns to be Addressed  discharge planning    Anticipated Changes Related to Illness none    Equipment Needed After Discharge none    Discharge Coordination/Progress Pt lives alone. Pt has good support from friends. [facility] Pt works FT, Pt denies any fin. stressors or need for DME at this time. Pt states he has transporation home if not he will use a cab. SW will continue to follow for needs.                   Discharge Plan       Row Name 02/26/24 1052       Plan    Plan Pt lives alone. Pt has good support from friends. [facility] Pt works FT, Pt denies any fin. stressors or need for DME at this time. Pt states he has transporation home if not he will use a cab. SW will continue to follow for needs.                  Continued Care and Services - Admitted Since 2/26/2024    Coordination has not been started for this encounter.          Demographic Summary       Row Name 02/26/24 1041       General Information    Admission Type inpatient    Arrived From emergency department    Referral Source admission list    Reason for Consult discharge planning    Preferred Language English       Contact Information    Permission Granted to Share Info With permission denied                   Functional Status       Row Name 02/26/24 1041       Functional Status    Usual Activity Tolerance excellent    Current Activity Tolerance excellent       Physical Activity    On average, how many days per week do you engage in moderate to strenuous exercise (like a brisk walk)? 0 days    On average, how many minutes do you engage in exercise at this level? 0 min    Number of minutes of exercise per week 0       Assessment of Health Literacy    How often do you have someone help you read hospital materials? Never    How often do you have problems learning about your medical condition because of difficulty understanding written information? Never    How often do you have  a problem understanding what is told to you about your medical condition? Never    How confident are you filling out medical forms by yourself? Quite a bit    Health Literacy Good       Functional Status, IADL    Medications independent    Meal Preparation independent    Housekeeping independent    Laundry independent    Shopping independent       Mental Status    General Appearance WDL WDL       Mental Status Summary    Recent Changes in Mental Status/Cognitive Functioning no changes       Employment/    Employment Status employed full-time    Shift Worked first shift                   Psychosocial    No documentation.                  Abuse/Neglect    No documentation.                  Legal       Row Name 02/26/24 1049       Financial Resource Strain    How hard is it for you to pay for the very basics like food, housing, medical care, and heating? Not hard       Financial/Legal    Source of Income salary/wages    Application for Public Assistance not applied       Legal    Criminal Activity/Legal Involvement none                   Substance Abuse    No documentation.                  Patient Forms    No documentation.                     Dorothy Erickson

## 2024-02-26 NOTE — CONSULTS
[facility]  [facility]      Patient Name:   Ahsan Moran  YOB: 1980  Identification Number:   MRN - [id number]  Date of Service:   02/26/2024 02:07:52    Diagnosis:        G45.9 - Transient cerebral ischemic attack, unspecified    Impression:       Left hemiparesis, paresthesia and expressive aphasia (resolved)      Patient's NIHSS was 0   ABCD 2 score of 4   CHADSVASC score 3   CT head did not show any acute intracranial hemorrhage.   IV thrombolytics (tPA or tNK): Not a candidate because non-disabling symptoms.       Our recommendations are outlined below.    Recommendations:          Stroke/Telemetry Floor        Neuro Checks        Bedside Swallow Eval        DVT Prophylaxis        IV Fluids, Normal Saline        Head of Bed 30 Degrees        Euglycemia and Avoid Hyperthermia (PRN Acetaminophen)        Antihypertensives PRN if Blood pressure is greater than 220/120 or there is a concern for End organ damage/contraindications for permissive HTN. If blood pressure is greater than 220/120 give labetalol PO or IV or Vasotec IV with a goal of 15% reduction in BP during the first 24 hours.        Admit to neurology floor for stroke work-up including lab works (CBC, CMP, TSH, fasting lipid profile, syphilis EIA screening test, HbA1C), MRI brain stroke protocol (can order w/w/o)         2D echo to check Ejection fraction, wall motion defects, intracardiac thrombi and PFO (with bubble study for PFO specially if pt<50 year)        Q4hour Neurochecks        Monitor 48 hours under telemetry for arrhythmia        Allow for permissive hypertension and hold all antihypertensives unless BP exceeds 220 mmHg systolic or 120 mmHg diastolic for which will treat with labetalol pushes        IVF 0.9NS @100 cc/hr (if needed)        Start aspirin and statin if no contraindication for secondary stroke ppx        ASA load 325mg po or 300 per rectal if failed SLP and 81mg Qday  there after.        Start Plavix 300mg load followed by 75 mg Qday for 21 days unless ICAD present on CTA h/n. If so, extend DAPT to 90 days    Lipid Panel to Be Obtained, if Not Done in the Last 30 Days    Therapies:        Physical Therapy, Occupational Therapy, Speech Therapy Assessment When Applicable    Dysphagia:        Swallow Evaluation, Bedside        NPO Until Swallow Evaluation    Disposition:        Neurology Follow Up Recommended    Sign Out:        Discussed with Emergency Department Provider        ------------------------------------------------------------------------------    Advanced Imaging:  CTA Head and Neck Completed.    CTP Completed.    LVO:No    Patient in not a candidate for GARCIA      Metrics:  Last Known Well: 02/26/2024 00:30:00  [facility] Notification Time: 02/26/2024 02:06:45  Arrival Time: 02/26/2024 01:53:45  Stamp Time: 02/26/2024 02:07:52  Initial Response Time: 02/26/2024 02:10:03  Symptoms: dizziness, nausea, ringing in the ears, and left face arm, numbness, tingling, and weakness, with some word fighting difficulties..  Initial patient interaction: 02/26/2024 02:15:23  NIHSS Assessment Completed: 02/26/2024 02:49:46  Patient is not a candidate for Thrombolytic.  Thrombolytic Medical Decision: 02/26/2024 02:49:48  Patient was not deemed candidate for Thrombolytic because of following reasons:  No disabling symptoms.    CT head showed no acute hemorrhage or acute core infarct.    Primary Provider Notified of Diagnostic Impression and Management Plan on: 02/26/2024 02:41:48        ------------------------------------------------------------------------------    History of Present Illness:  Patient is a 44 year old Male.    Patient was brought by EMS for symptoms of dizziness, nausea, ringing in the ears, and left face arm, numbness, tingling, and weakness, with some word fighting difficulties..  Patient is a 44-year-old male medical history significant for HTN and A-fib, not  on anticoagulation, and alcohol abuse presented as a stroke with complaints of dizziness, nausea, ringing in the ears, and left face arm, numbness, tingling, and weakness, with some word fighting difficulties.  Last known well 00 30 hrs.  As of note, patient states symptoms have resolved. Patient does have mild nausea and dizziness with position change.  Patient normally drinks 5-8 cans of beers on a daily basis. Last drink was approximately 24 hrs ago.      Past Medical History:       Hypertension    Medications:    No Anticoagulant use   No Antiplatelet use  Reviewed EMR for current medications    Allergies:   Reviewed    Social History:  Alcohol Use: Yes    Family History:    There is no family history of premature cerebrovascular disease pertinent to this consultation    ROS :  14 Points Review of Systems was performed and was negative except mentioned in HPI.    Past Surgical History:  There Is No Surgical History Contributory To Today’s Visit        Examination:  BP(216/141), Pulse(82),  1A: Level of Consciousness - Alert; keenly responsive + 0  1B: Ask Month and Age - Both Questions Right + 0  1C: Blink Eyes & Squeeze Hands - Performs Both Tasks + 0  2: Test Horizontal Extraocular Movements - Normal + 0  3: Test Visual Fields - No Visual Loss + 0  4: Test Facial Palsy (Use Grimace if Obtunded) - Normal symmetry + 0  5A: Test Left Arm Motor Drift - No Drift for 10 Seconds + 0  5B: Test Right Arm Motor Drift - No Drift for 10 Seconds + 0  6A: Test Left Leg Motor Drift - No Drift for 5 Seconds + 0  6B: Test Right Leg Motor Drift - No Drift for 5 Seconds + 0  7: Test Limb Ataxia (FNF/Heel-Shin) - No Ataxia + 0  8: Test Sensation - Normal; No sensory loss + 0  9: Test Language/Aphasia - Normal; No aphasia + 0  10: Test Dysarthria - Normal + 0  11: Test Extinction/Inattention - No abnormality + 0    NIHSS Score: 0    Pre-Morbid Modified Eliot Scale:  0 Points = No symptoms at all    Spoke with : Dr. Joselin BLAKELY  reviewed the available imaging via [facility] and initiated discussion with the primary provider    Patient/Family was informed the Neurology Consult would occur via TeleHealth consult by way of interactive audio and video telecommunications and consented to receiving care in this manner.      Patient is being evaluated for possible acute neurologic impairment and high probability of imminent or life-threatening deterioration. I spent total of 35 minutes providing care to this patient, including time for face to face visit via telemedicine, review of medical records, imaging studies and discussion of findings with providers, the patient and/or family.      Dr Dima Dominguez      [facility]  For Inpatient follow-up with [facility] physician please call [facility] [phone]. This is not an outpatient service. Post hospital discharge, please contact hospital directly.    Please do not communicate with [facility] physicians via secure chat. If you have any questions, Please contact [facility].  Please call or reconsult our service if there are any clinical or diagnostic changes.

## 2024-02-26 NOTE — PLAN OF CARE
Goal Outcome Evaluation:      FUNCTIONAL ASSESSMENT INSTRUMENT: Patient currently scored a level 7 of 7 on Functional Communication Measures for swallowing indicating a 0% limitation in function.    ASSESSMENT/ PLAN OF CARE:  Pt presents with adequate oropharyngeal swallow. No clinical signs of aspiration noted. Speech and language services not needed at this time.     REHAB POTENTIAL:  Pt has good rehab potential.  The following limitations may influence improvement/ length of tx  medical status.    SLP Recommendation and Plan  RECOMMENDATIONS:   1.   DIET: Regular diet, thin liquids    2.  POSITION: 90 degrees uptight     3.  COMPENSATORY STRATEGIES: standard.

## 2024-02-26 NOTE — H&P
[facility]   HOSPITALIST HISTORY AND PHYSICAL  Date: 2024   Patient Name: Ahsan Moran  : 1980  MRN: [id number]  Primary Care Physician:  Cy, No Known  Date of admission: 2024    Subjective   Subjective     Chief Complaint: Left-sided numbness/tingling, unable to express words    HPI:    Ahsan Moran is a 44 y.o. male presented to the ED for evaluation of left-sided numbness/tingling, unable to express thoughts that started approximately 1.5 hours prior to arrival to the ED.  Patient was sitting in the couch when he suddenly noticed tingling sensation/numbness on the left side of his body, unable to express his words.  Patient was able to think through but was not able to express his thoughts. No focal weakness.  Immediately called EMS and again brought into the ED.  By the time patient has come to the ED symptoms started to improve and has currently resolved during my evaluation.  No similar episodes in the past.  In 2016 patient was noted to have some palpitations and went to the ED and was noted to be hypertensive and diagnosed with paroxysmal A-fib.  Not on anticoagulation.  Denies any lightheadedness, dizziness, focal weakness, chest pain, shortness of breath, abdominal pain, diarrhea.    Patient has a long history of alcohol intake, drinks 6-8 beers, up to 6 times a week.  Smokeless tobacco.  Denies using any other recreational drugs.  Last alcohol drink was on Saturday.    In the ED, vital signs temperature 98.2, pulse 112, respiratory rate 14, blood pressure 205/126 on room air saturating at 99%.  Labs showed sodium 132, potassium 3.3, chloride 97, bicarb 16.5, anion gap 18.5, AST/ALT 67/37 total bili 2.1, rest of the CMP with no significant findings, normal CBC.  Chest x-ray showed no acute abnormality.  EKG showed sinus rhythm.  CT head without contrast showed no acute abnormality.  CTP brain inconclusive due to motion artifact., CTA head and neck showed no large vessel  occlusion.  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 patient has been evaluated by teleneurologist recommended admission for further evaluation of TIA.      Personal History     Past Medical History:   Diagnosis Date    A-fib     Hypertension          History reviewed. No pertinent surgical history.      History reviewed. No pertinent family history.      Social History     Socioeconomic History    Marital status:    Tobacco Use    Smoking status: Never    Smokeless tobacco: Current     Types: Snuff   Vaping Use    Vaping Use: Never used   Substance and Sexual Activity    Alcohol use: Yes     Alcohol/week: 40.0 standard drinks of alcohol     Types: 40 Cans of beer per week    Drug use: Not Currently    Sexual activity: Defer         Home Medications:       Allergies:  No Known Allergies    Review of Systems   All other systems reviewed and negative except as mentioned above in the HPI    Objective   Objective     Vitals:   Temp:  [98.2 °F (36.8 °C)] 98.2 °F (36.8 °C)  Heart Rate:  [] 93  Resp:  [14] 14  BP: (162-216)/(110-153) 182/120    Physical Exam    Constitutional: Awake, alert, no acute distress   Eyes: Pupils equal, sclerae anicteric, no conjunctival injection   HENT: NCAT, mucous membranes moist   Respiratory: Clear to auscultation bilaterally, nonlabored respirations    Cardiovascular: RRR, no murmurs   Gastrointestinal: Positive bowel sounds, soft, nontender, nondistended   Musculoskeletal: No bilateral ankle edema, no clubbing or cyanosis to extremities   Psychiatric: Appropriate affect, cooperative   Neurologic: Oriented x 3, strength 5/5 all extremities, sensation equal and intact bilaterally to light touch, cranial nerves II to XII intact speech clear       Result Review    Result Review:  I have personally reviewed the results from the time of this admission to 2/26/2024 04:10  EST and agree with these findings:  [x]  Laboratory  []  Microbiology  [x]  Radiology  [x]  EKG/Telemetry   []  Cardiology/Vascular   []  Pathology  []  Old records  []  Other:        Imaging Results (Last 24 Hours)       Procedure Component Value Units Date/Time    CT Angiogram Neck [[id number]] Collected: 02/26/24 0401     Updated: 02/26/24 0404    Narrative:      PROCEDURE: CT ANGIOGRAM NECK     COMPARISON: 2/26/2024.     INDICATIONS: Neuro. deficit, acute; possible acute ischemic stroke (AIS).     PROTOCOL:   Standard CTA imaging protocol performed.      RADIATION:   Total DLP: 812.7 mGy*cm.    Automated exposure control was utilized to minimize radiation dose.   CONTRAST: 80 mL Isovue 370 I.V.  LABS:   Not provided.  RAPID: CTA imaging was analyzed using RAPID AI to enable computer assisted triage notification to   rapidly detect a large vessel occlusion (LVO) and shorten notification time.     TECHNIQUE:   After obtaining the patient's consent, 909 CT/CTA images of the neck were obtained with intravenous   contrast.  Multi-planar/3-D imaging was created and interpreted to optimize visualization of   arterial anatomy.  Unless otherwise stated in this report, all arterial luminal stenoses involving   the internal carotid arteries are derived by dividing the lesion diameter by the diameter of the   normal internal carotid artery more distally (that is, using NASCET criteria).  Despite best   efforts, poor image quality limits interpretation of the study, especially due to a limited   contrast bolus within the intracranial arteries.  There is also motion artifact on the study.    Also, the study is limited in that there is incomplete visualization of the aortic arch.  Also, the   origins of the great arteries are not completely visualized.     FINDINGS:      LEFT  INTERNAL CAROTID: No hemodynamically significant stenosis or dissection.    EXTERNAL CAROTID: No hemodynamically significant stenosis or dissection.     COMMON CAROTID: No hemodynamically significant stenosis or dissection.    VERTEBRAL: No hemodynamically significant stenosis or dissection.  The vertebral arteries are   probably codominant.     RIGHT  INTERNAL CAROTID: No hemodynamically significant stenosis or dissection.    EXTERNAL CAROTID: No hemodynamically significant stenosis or dissection.    COMMON CAROTID: No hemodynamically significant stenosis or dissection.    VERTEBRAL: No hemodynamically significant stenosis or dissection.  The vertebral arteries are   probably codominant.     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  Disc and endplate degenerative changes are present at C4-5.    To a lesser extent, degenerative changes are seen elsewhere within the cervical spine.  Streak   artifact from dental amalgam obscures detail on the exam.  There is venous contamination on the   study, especially on the left, obscuring the left subclavian artery.  Again, the study is   motion-limited.  Nonspecific small to moderate sized bilateral cervical lymph nodes are   appreciated.       Impression:            1. No hemodynamically significant arterial stenoses are seen.       2. No emergent large vessel occlusion.       3. No acute arterial dissection.       4. The vertebral arteries are patent and codominant.                Please note that portions of this note were completed with a voice recognition program.     MANDA YEPEZ JR, MD         Electronically Signed and Approved By: MANDA YEPEZ JR, MD on 2/26/2024 at 4:01                        CT Angiogram Head w AI Analysis of LVO [[id number]] Collected: 02/26/24 0353     Updated: 02/26/24 0356    Narrative:      PROCEDURE: CT ANGIOGRAM HEAD W AI ANALYSIS OF LVO     COMPARISON: 2/26/2024.     INDICATIONS: Neuro.  deficit, acute; possible acute ischemic stroke (AIS).     PROTOCOL:   Standard imaging protocol performed      RADIATION:   DLP: 812.7 mGy*cm    Automated exposure control was utilized to minimize  radiation dose.   CONTRAST: 80 cc Isovue 370 I.V.  LABS:   Not provided.  RAPID: CTA imaging was analyzed using RAPID AI to enable computer assisted triage notification to   rapidly detect a large vessel occlusion (LVO) and shorten notification time.     TECHNIQUE:   After obtaining the patient's consent, 909 CT/CTA images of the head were obtained with non-ionic   contrast, and multi-planar/3-D imaging were created and interpreted to optimize visualization of   arterial anatomy.  Despite best efforts, poor image quality limits interpretation of the study,   especially due to a limited contrast bolus within the intracranial arteries.  There is also motion   artifact on the study.     FINDINGS:   Again, the study is limited.  Grossly, no emergent large vessel occlusion is appreciated.  There is   a suspected 2 mm saccular cerebral aneurysm involving the right lateral proximal right A2 segment,   such as seen on image 242 of series 401.  It is also seen on image 1 of series 1002 and image 3 of   series 1001 and adjacent images.  No other definite cerebral aneurysms are seen.  The bulbous   appearance of the terminus of the basilar artery is thought to represent a normal variant rather   than to represent an infundibulum or basilar tip aneurysm.  The A1 segment on the right is   hypoplastic, which is thought be a normal variant.  The intradural (V4 segment) right vertebral   artery is minimally larger in caliber than the intradural left vertebral artery.  No   hemodynamically significant arterial stenoses are suggested.  The posterior communicating arteries   are not well seen and may be hypoplastic.  The right posterior inferior cerebellar artery (PICA) is   not well seen.  There may be a dominant right-sided anterior inferior cerebellar artery (AICA) and   a dominant left-sided PICA, as with a normal variant.  Please see the nonenhanced head CT exam   report from earlier during the same day for further detail  regarding additional intracranial   findings.         Impression:            1. The study is limited.       2. No definite emergent large vessel occlusion is identified.       3. There is a suspected 2 mm saccular aneurysm involving the right lateral aspect of the proximal   A2 segment of the right anterior cerebral artery (RANI).  No other definite cerebral aneurysms are   seen.       4. No hemodynamically significant arterial stenoses are identified.     5. Please see above comments for further detail.             Please note that portions of this note were completed with a voice recognition program.     MANDA YEPEZ JR, MD         Electronically Signed and Approved By: MANDA YEPEZ JR, MD on 2/26/2024 at 3:53                        CT CEREBRAL PERFUSION WITH & WITHOUT CONTRAST [[id number]] Collected: 02/26/24 0341     Updated: 02/26/24 0344    Narrative:      PROCEDURE: CT CEREBRAL PERFUSION W WO CONTRAST     COMPARISONS: 2/26/2024.     INDICATIONS: Neuro. deficit, acute, stroke suspected.     PROTOCOL:   Standard CTP imaging protocol performed.      RADIATION:   Total DLP: 1301.4 mGy*cm.    Automated exposure control was utilized to minimize radiation dose.   CONTRAST: Not provided (? 80 mL).  LABS:   Not provided.     TECHNIQUE:    CT perfusion (CTP) was performed, utilizing a total of 80 mL of nonionic intravenous contrast agent   (as indicated above) with an injection rate of 5 mL/s.  A total of 8 cm of brain coverage (acquired   in two 4 cm contiguous labs) was used for the CTP study.  The images were processed using [employer]   software (by [employer] [place], [place]).  The CTP maps, including the cerebral blood volume   (CBV), cerebral blood flow (CBF), mean transit time (MTT), time to maximum (Tmax), and time to peak   (TTP), were reviewed as well as the summary map(s).  The examination appears to be technically   adequate.  There arterial input function (AIF) and the venous output function (VOF)  appear to be   appropriately placed.  Despite best efforts, poor image quality limits interpretation of the study   especially related to motion artifact.     FINDINGS:   The study is nondiagnostic due to the degree of patient motion.       Impression:         The study is nondiagnostic due to the degree of patient motion.              Please note that portions of this note were completed with a voice recognition program.     MANDA YEPEZ JR, MD         Electronically Signed and Approved By: MANDA YEPEZ JR, MD on 2/26/2024 at 3:41                        XR Chest 1 View [[id number]] Resulted: 02/26/24 0303     Updated: 02/26/24 0303    CT Head Without Contrast Stroke Protocol [[id number]] Collected: 02/26/24 0231     Updated: 02/26/24 0234    Narrative:      PROCEDURE: CT HEAD WO CONTRAST STROKE PROTOCOL     COMPARISON: None.     INDICATIONS: Neuro deficit, acute, stroke suspected.     PROTOCOL:   Standard CT imaging protocol performed.      RADIATION:   Total DLP: 1,081.2 mGy*cm.    MA and/or KV were/was adjusted to minimize radiation dose.      TECHNIQUE: After obtaining the patient's consent, 528 multi-planar CT images were obtained without   non-ionic intravenous contrast material.      DISCUSSION:   A routine nonenhanced head CT was performed.  No acute brain abnormality is   identified.  No acute intracranial hemorrhage.  No acute infarct is seen.  No acute skull fracture.    No midline shift or acute intracranial mass effect is seen.  The ventricular size and   configuration are within normal limits.  Diffuse prominence of the extra-axial spaces is seen,   suggesting global atrophy for the patient's age.  Chronic bilateral nasal bone fractures are   suggested.  There is suspected chronic sinus disease, especially involving the right maxillary   antrum.  The imaged middle ear clefts (that is, the bilateral mastoid air cell complexes, bilateral   middle ear cavities, and bilateral external auditory canals)  are well aerated.  No definite acute   orbital findings are suggested.  Probably benign retrocerebellar cystic structures are seen, likely   related to arachnoid cysts, roughly estimated at 3.9 x 2.8 x 3.3 cm in transverse, anteroposterior   (AP), and craniocaudal extent, respectively, as seen on image 28 of series 201 and image 49 of   series 203 and adjacent images.       Impression:       No acute brain abnormality is seen. Specifically, no acute intracranial hemorrhage,   no acute infarct, no significant intracranial mass lesion, and no acute intracranial mass effect   are appreciated.  Please see above comments for further detail.          Please note that portions of this note were completed with a voice recognition program.     MANDA YEPEZ JR, MD         Electronically Signed and Approved By: MANDA YEPEZ JR, MD on 2/26/2024 at 2:31                                       Assessment & Plan   Assessment / Plan     Assessment/Plan:   Left-sided numbness/tingling-resolved  Expressive aphasia-resolved  Concern for TIA  Hyperbilirubinemia  Paroxysmal A-fib, not on anticoagulation  Hypertension  Chronic alcohol use, at risk for alcohol withdrawal    Plan  Admit inpatient, telemetry for 48 hours  Neurochecks per protocol  MRI brain without contrast  Cardiac echo  Permissive hypertension, treat with as needed antihypertensives if her blood pressure is greater than 220/120 or symptomatic. If blood pressure is greater than 220/120 give labetalol PO or IV or Vasotec IV with a goal of 15% reduction in BP during the first 24 hours.  A1c, lipid panel,  Aspirin loading dose followed by aspirin 81 mg daily  Plavix 300 mg loading dose followed by 75 mg daily for 21 days  Teleneurology follow-up  [facility] protocol  IV thiamine  Folic acid  Check vitamin B12 levels  Nursing dysphagia screen  Heart healthy diet  Monitor LFTs, bilirubin level with a.m. labs.  Elevation could likely secondary to chronic alcohol use.  Ultrasound  abdomen to evaluate for cirrhosis  Fall precautions  PT OT consult    DVT prophylaxis:   Subcutaneous heparin    CODE STATUS:    Level Of Support Discussed With: Patient  Code Status (Patient has no pulse and is not breathing): CPR (Attempt to Resuscitate)  Medical Interventions (Patient has pulse or is breathing): Full Support      Admission Status:  I believe this patient meets observation status.    Part of this note may be an electronic transcription/translation of spoken language to printed text using the Dragon Dictation System    Sae Kemp MD

## 2024-02-26 NOTE — PLAN OF CARE
Goal Outcome Evaluation:  Plan of Care Reviewed With: patient           Outcome Evaluation: Pt did not demonstrate any safety or mobility deficits during the initial evaluation.  Pt is safe to continue ambulating within their room independently until their discharge from the hospital.  Pt will be discharged from PT services at this time.      Anticipated Discharge Disposition (PT): home

## 2024-02-27 ENCOUNTER — READMISSION MANAGEMENT (OUTPATIENT)
Dept: CALL CENTER | Facility: HOSPITAL | Age: 44
End: 2024-02-27
Payer: OTHER GOVERNMENT

## 2024-02-27 VITALS
WEIGHT: 267.2 LBS | HEART RATE: 81 BPM | RESPIRATION RATE: 16 BRPM | OXYGEN SATURATION: 100 % | SYSTOLIC BLOOD PRESSURE: 136 MMHG | HEIGHT: 72 IN | TEMPERATURE: 98.1 F | DIASTOLIC BLOOD PRESSURE: 93 MMHG | BODY MASS INDEX: 36.19 KG/M2

## 2024-02-27 LAB
ALBUMIN SERPL-MCNC: 3.8 G/DL (ref 3.5–5.2)
ALBUMIN/GLOB SERPL: 1.2 G/DL
ALP SERPL-CCNC: 84 U/L (ref 39–117)
ALT SERPL W P-5'-P-CCNC: 24 U/L (ref 1–41)
ANION GAP SERPL CALCULATED.3IONS-SCNC: 11 MMOL/L (ref 5–15)
AST SERPL-CCNC: 32 U/L (ref 1–40)
BILIRUB SERPL-MCNC: 1.4 MG/DL (ref 0–1.2)
BUN SERPL-MCNC: 11 MG/DL (ref 6–20)
BUN/CREAT SERPL: 10.8 (ref 7–25)
CALCIUM SPEC-SCNC: 9.3 MG/DL (ref 8.6–10.5)
CHLORIDE SERPL-SCNC: 100 MMOL/L (ref 98–107)
CO2 SERPL-SCNC: 23 MMOL/L (ref 22–29)
CREAT SERPL-MCNC: 1.02 MG/DL (ref 0.76–1.27)
DEPRECATED RDW RBC AUTO: 40.1 FL (ref 37–54)
EGFRCR SERPLBLD CKD-EPI 2021: 92.4 ML/MIN/1.73
ERYTHROCYTE [DISTWIDTH] IN BLOOD BY AUTOMATED COUNT: 12.3 % (ref 12.3–15.4)
GLOBULIN UR ELPH-MCNC: 3.2 GM/DL
GLUCOSE SERPL-MCNC: 105 MG/DL (ref 65–99)
HCT VFR BLD AUTO: 39.3 % (ref 37.5–51)
HGB BLD-MCNC: 13.9 G/DL (ref 13–17.7)
MAGNESIUM SERPL-MCNC: 1.7 MG/DL (ref 1.6–2.6)
MCH RBC QN AUTO: 31.7 PG (ref 26.6–33)
MCHC RBC AUTO-ENTMCNC: 35.4 G/DL (ref 31.5–35.7)
MCV RBC AUTO: 89.7 FL (ref 79–97)
PLATELET # BLD AUTO: 142 10*3/MM3 (ref 140–450)
PMV BLD AUTO: 10.2 FL (ref 6–12)
POTASSIUM SERPL-SCNC: 4.3 MMOL/L (ref 3.5–5.2)
PROT SERPL-MCNC: 7 G/DL (ref 6–8.5)
RBC # BLD AUTO: 4.38 10*6/MM3 (ref 4.14–5.8)
SODIUM SERPL-SCNC: 134 MMOL/L (ref 136–145)
WBC NRBC COR # BLD AUTO: 5.66 10*3/MM3 (ref 3.4–10.8)

## 2024-02-27 PROCEDURE — 99233 SBSQ HOSP IP/OBS HIGH 50: CPT | Performed by: PSYCHIATRY & NEUROLOGY

## 2024-02-27 PROCEDURE — 94799 UNLISTED PULMONARY SVC/PX: CPT

## 2024-02-27 PROCEDURE — 80053 COMPREHEN METABOLIC PANEL: CPT | Performed by: INTERNAL MEDICINE

## 2024-02-27 PROCEDURE — 99239 HOSP IP/OBS DSCHRG MGMT >30: CPT | Performed by: INTERNAL MEDICINE

## 2024-02-27 PROCEDURE — 25010000002 THIAMINE PER 100 MG: Performed by: STUDENT IN AN ORGANIZED HEALTH CARE EDUCATION/TRAINING PROGRAM

## 2024-02-27 PROCEDURE — 85027 COMPLETE CBC AUTOMATED: CPT | Performed by: INTERNAL MEDICINE

## 2024-02-27 PROCEDURE — 83735 ASSAY OF MAGNESIUM: CPT | Performed by: INTERNAL MEDICINE

## 2024-02-27 PROCEDURE — 25010000002 HEPARIN (PORCINE) PER 1000 UNITS: Performed by: STUDENT IN AN ORGANIZED HEALTH CARE EDUCATION/TRAINING PROGRAM

## 2024-02-27 RX ORDER — ATORVASTATIN CALCIUM 80 MG/1
80 TABLET, FILM COATED ORAL NIGHTLY
Qty: 30 TABLET | Refills: 0 | Status: SHIPPED | OUTPATIENT
Start: 2024-02-27 | End: 2024-03-28

## 2024-02-27 RX ORDER — LISINOPRIL 20 MG/1
20 TABLET ORAL DAILY
Status: DISCONTINUED | OUTPATIENT
Start: 2024-02-27 | End: 2024-02-27 | Stop reason: HOSPADM

## 2024-02-27 RX ORDER — METOPROLOL SUCCINATE 25 MG/1
25 TABLET, EXTENDED RELEASE ORAL
Status: DISCONTINUED | OUTPATIENT
Start: 2024-02-27 | End: 2024-02-27 | Stop reason: HOSPADM

## 2024-02-27 RX ORDER — LANOLIN ALCOHOL/MO/W.PET/CERES
100 CREAM (GRAM) TOPICAL DAILY
Qty: 30 TABLET | Refills: 0 | Status: SHIPPED | OUTPATIENT
Start: 2024-03-02 | End: 2024-04-01

## 2024-02-27 RX ORDER — FOLIC ACID 1 MG/1
1 TABLET ORAL DAILY
Qty: 30 TABLET | Refills: 0 | Status: SHIPPED | OUTPATIENT
Start: 2024-02-28 | End: 2024-03-29

## 2024-02-27 RX ORDER — ASPIRIN 81 MG/1
81 TABLET, CHEWABLE ORAL DAILY
Qty: 30 TABLET | Refills: 0 | Status: SHIPPED | OUTPATIENT
Start: 2024-02-28 | End: 2024-03-29

## 2024-02-27 RX ADMIN — THIAMINE HYDROCHLORIDE 200 MG: 100 INJECTION, SOLUTION INTRAMUSCULAR; INTRAVENOUS at 13:54

## 2024-02-27 RX ADMIN — LISINOPRIL 20 MG: 20 TABLET ORAL at 14:21

## 2024-02-27 RX ADMIN — METOPROLOL SUCCINATE 25 MG: 25 TABLET, EXTENDED RELEASE ORAL at 14:21

## 2024-02-27 RX ADMIN — ASPIRIN 81 MG: 81 TABLET, CHEWABLE ORAL at 08:11

## 2024-02-27 RX ADMIN — PANTOPRAZOLE SODIUM 40 MG: 40 TABLET, DELAYED RELEASE ORAL at 05:43

## 2024-02-27 RX ADMIN — HEPARIN SODIUM 5000 UNITS: 5000 INJECTION INTRAVENOUS; SUBCUTANEOUS at 14:21

## 2024-02-27 RX ADMIN — Medication 10 ML: at 08:11

## 2024-02-27 RX ADMIN — FOLIC ACID 1 MG: 1 TABLET ORAL at 08:11

## 2024-02-27 RX ADMIN — THIAMINE HYDROCHLORIDE 200 MG: 100 INJECTION, SOLUTION INTRAMUSCULAR; INTRAVENOUS at 05:43

## 2024-02-27 RX ADMIN — HEPARIN SODIUM 5000 UNITS: 5000 INJECTION INTRAVENOUS; SUBCUTANEOUS at 05:43

## 2024-02-27 RX ADMIN — CLOPIDOGREL BISULFATE 75 MG: 75 TABLET ORAL at 08:11

## 2024-02-27 NOTE — PROGRESS NOTES
[facility]  [facility] Tele[facility]urology Consult Services    Routine Consult Follow-Up    Patient Name:   Ahsan Moran  YOB: 1980  Identification Number:   MRN - [id number]  Date of Service:   02/27/2024 08:00:07    Diagnosis        G45.9 - Transient cerebral ischemic attack, unspecified    Impression  Left hemiparesis, paresthesia and expressive aphasia (resolved)  Possibly TIA versus partial seizure  Given his history of atrial fibrillation, I would recommend taking oral anticoagulant long term, Eliquis is acceptable  Given temporal gliosis on MRI, I would question the possibility for partial seizure. He would rather do EEG as an outpatient. I recommend that he have close follow up with a neurologist to ensure continuity of care and proper evaluation.  Follow up with PCP post discharge for continuity of care  No additional recommendations at this time.    Our recommendations are outlined below    Laboratory Studies :  Lipid panel  I ordered  Hemoglobin A1c    Antithrombotic Medication :  Aspirin 81 mg PO daily  Already Ordered  Statins for LDL goal less than 70  Already Ordered    Anticoagulant Medication :  Eliquis 5mg bid  Already Ordered    Nursing Recommendations :  IV Fluids, avoid dextrose containing fluids, Maintain euglycemiaNeuro checks q4 hrs x 24 hrs and then per shiftHead of bed 30 degreesContinue with Telemetry    Consultations :  Recommend Speech therapy if failed dysphagia screenPhysical therapy/Occupational therapySmoking cessation counselling recommendedInpatient rehab if recommended by physical/occupational therapy    DVT Prophylaxis :  Choice of Primary Team    Disposition :  No further recommendations  Will signoff please contact for any questions  Outpatient Neurology follow up in 1-3 weeks    Subjective  44-year-old male with a medical history of hypertension (HTN), atrial fibrillation (A-fib) without anticoagulation, and alcohol abuse was brought in by EMS with  symptoms suggestive of a stroke, including dizziness, nausea, ringing in the ears, left-sided face and arm numbness, tingling, weakness, and difficulty with word finding. His last known normal state was at 00:30 hours. Interestingly, the patient reports that his symptoms have now resolved, although he still experiences mild nausea and dizziness upon changing positions.    It's important to note that the patient typically consumes 5-8 cans of beer daily, with his last drink approximately 24 hours prior to presentation.    Hospital Course  Doing better today, clarifies that events from yesterday were transient in nature.    Imaging  HEAD CT: No significant findings  CTA HEAD: No LVO  MRI Brain: Left anterior temporal FLAIR changes  ECHO: No cardioembolic sources of TIA/stroke seen    Labs  TSH elevated but normal free T4  AST elevated  B12 337  A1c = 5.4       Examination  BP(145/92), Pulse(86), Temp(98.1), Resp(16),    Neuro Exam:  General: Alert,Awake, Oriented to Time, Place, Person    Speech: Fluent:    Language: Intact:    Face: Symmetric:    Facial Sensation: Intact:    Extraocular Movements: Intact:    Motor Exam: No Drift:    Sensation: Intact:    Coordination: Intact:           Patient/Family was informed the Neurology Consult would happen via TeleHealth consult by way of interactive audio and video telecommunications and consented to receiving care in this manner.    Telehealth Neurology consultation was provided. I spent 27 minutes providing telehealth care. This includes time spent for face to face visit via telemedicine, review of medical records, imaging studies and discussion of findings with providers, the patient and/or family.      Dr Brent Keller      [facility]  For Inpatient follow-up with [facility] physician please call [facility] [phone]. This is not an outpatient service. Post hospital discharge, please contact hospital directly.    Please do not communicate with [facility]  physicians via secure chat. If you have any questions, Please contact [facility].  Please call or reconsult our service if there are any clinical or diagnostic changes.

## 2024-02-27 NOTE — SIGNIFICANT NOTE
02/27/24 1230   Coping/Psychosocial   Observed Emotional State calm;cooperative   Verbalized Emotional State relief;hopefulness   Trust Relationship/Rapport empathic listening provided   Involvement in Care interacting with patient   Additional Documentation Spiritual Care (Group)   Spiritual Care   Use of Spiritual Resources non-Voodoo use of spiritual care   Spiritual Care Source  initiative   Spiritual Care Follow-Up follow-up, none required as presently assessed   Response to Spiritual Care receptive of support   Spiritual Care Interventions supportive conversation provided   Spiritual Care Visit Type initial   Receptivity to Spiritual Care visit welcomed

## 2024-02-27 NOTE — PLAN OF CARE
Goal Outcome Evaluation:  Plan of Care Reviewed With: patient        Progress: improving.

## 2024-02-27 NOTE — DISCHARGE SUMMARY
[facility]         HOSPITALIST  DISCHARGE SUMMARY    Patient Name: Ahsan Moran  : 1980  MRN: [id number]    Date of Admission: 2024  Date of Discharge:  2024  [facility]: [facility]  Admitting: Medicine  Consultants: Neuro    Final Diagnoses:  Possible TIA  Hypertension  History of paroxysmal atrial fibrrilation      Hospital Course     Hospital Course:  Ahsan Moran is a very pleasant 44 y.o. male who presented with a transient episode of difficulty speaking, feeling like he knew what to say but only certain words or letters would come out. It had resolved by the time he got here. TeleNeuro saw the patient, suspected diagnosis was TIA. They raised the question of partial seizure; pt has no history of seizure. He preferred to do an EEG as an outpatient. He is stable for discharge today. Given this episode coupled with a history of pAF, it was advised that he begin Eliquis for stroke prevention. We discussed r/b and he is agreeable; no bleeding issues.      DISCHARGE Follow Up Recommendations for labs and diagnostics: Have referred to neurology for followup and consideration of outpatient EEG      Day of Discharge     Vital Signs:  Temp:  [97.9 °F (36.6 °C)-98.2 °F (36.8 °C)] 98.1 °F (36.7 °C)  Heart Rate:  [62-86] 81  Resp:  [16] 16  BP: (136-169)/() 136/93  Physical Exam: nad, sitting up, well appearing, speech clear, unremarkable exam      Discharge Details        Discharge Medications        New Medications        Instructions Start Date   apixaban 5 MG tablet tablet  Commonly known as: ELIQUIS   5 mg, Oral, Every 12 Hours Scheduled      aspirin 81 MG chewable tablet   81 mg, Oral, Daily   Start Date: 2024     atorvastatin 80 MG tablet  Commonly known as: LIPITOR   80 mg, Oral, Nightly      folic acid 1 MG tablet  Commonly known as: FOLVITE   1 mg, Oral, Daily   Start Date: 2024     thiamine 100 MG tablet  Commonly known as: VITAMIN  B1   100 mg, Oral, Daily   Start Date: March 2, 2024            Continue These Medications        Instructions Start Date   cholecalciferol 25 MCG (1000 UT) tablet  Commonly known as: VITAMIN D3   2,000 Units, Oral, Daily      lisinopril 20 MG tablet  Commonly known as: PRINIVIL,ZESTRIL   1 tablet, Oral, Daily      Metoprolol Succinate 50 MG capsule extended-release 24 hour sprinkle   1 tablet, Oral, Daily      multivitamin tablet tablet   1 tablet, Oral, Daily      omeprazole 20 MG capsule  Commonly known as: priLOSEC   1 capsule, Oral, Daily             Stop These Medications      TART CHERRY PO              No Known Allergies    Discharge Disposition:  Home or Self Care    Diet:  Hospital:  Diet Order   Procedures    Diet: Cardiac Diets; Healthy Heart (2-3 Na+); Texture: Regular Texture (IDDSI 7); Fluid Consistency: Thin (IDDSI 0)       Discharge Activity:       CODE STATUS:  Code Status and Medical Interventions:   Ordered at: 02/26/24 0404     Level Of Support Discussed With:    Patient     Code Status (Patient has no pulse and is not breathing):    CPR (Attempt to Resuscitate)     Medical Interventions (Patient has pulse or is breathing):    Full Support         No future appointments.    Additional Instructions for the Follow-ups that You Need to Schedule       Discharge Follow-up with PCP   As directed       Currently Documented PCP:    Provider, No Known    PCP Phone Number:    None     Follow Up Details: [facility] within 1 week                Pertinent  and/or Most Recent Results     PROCEDURES:   none    LAB RESULTS:      Lab 02/27/24  0436 02/26/24  0350 02/26/24  0206   WBC 5.66  --  8.71   HEMOGLOBIN 13.9  --  16.3   HEMATOCRIT 39.3  --  44.1   PLATELETS 142  --  173   NEUTROS ABS  --   --  6.58   IMMATURE GRANS (ABS)  --   --  0.03   LYMPHS ABS  --   --  1.25   MONOS ABS  --   --  0.80   EOS ABS  --   --  0.01   MCV 89.7  --  84.5   LACTATE  --  1.3  --    PROTIME  --   --  13.0   APTT  --   --  25.5          Lab 02/27/24  0436 02/26/24  1203 02/26/24  0522 02/26/24  0206   SODIUM 134* 130*  --  132*   POTASSIUM 4.3 3.9  --  3.3*   CHLORIDE 100 99  --  97*   CO2 23.0 19.1*  --  16.5*   ANION GAP 11.0 11.9  --  18.5*   BUN 11 12  --  13   CREATININE 1.02 1.12  --  0.96   EGFR 92.4 82.6  --  99.3   GLUCOSE 105* 129*  --  133*   CALCIUM 9.3 8.8  --  9.2   MAGNESIUM 1.7  --   --   --    HEMOGLOBIN A1C  --   --  5.40  --    TSH  --   --  4.820*  --          Lab 02/27/24  0436 02/26/24  1203 02/26/24  0206   TOTAL PROTEIN 7.0 6.7 7.5   ALBUMIN 3.8 3.9 4.2   GLOBULIN 3.2 2.8 3.3   ALT (SGPT) 24 30 37   AST (SGOT) 32 46* 67*   BILIRUBIN 1.4* 2.1* 2.1*   ALK PHOS 84 89 100         Lab 02/26/24  0206   HSTROP T 7   PROTIME 13.0   INR 0.96         Lab 02/26/24  0522   CHOLESTEROL 155   LDL CHOL 43   HDL CHOL 34*   TRIGLYCERIDES 541*         Lab 02/26/24  0206   VITAMIN B 12 337         Brief Urine Lab Results  (Last result in the past 365 days)        Color   Clarity   Blood   Leuk Est   Nitrite   Protein   CREAT   Urine HCG        02/26/24 0356 Yellow   Clear   Negative   Trace   Negative   30 mg/dL (1+)                 Microbiology Results (last 10 days)       ** No results found for the last 240 hours. **            MRI Brain Without Contrast    Result Date: 2/26/2024    1. Incomplete examination as described above. 2. No acute infarction, intracranial hemorrhage, herniation, or hydrocephalus. 3. FLAIR hyperintensity within anterior left temporal lobe.  This could represent gliosis or encephalomalacia.  However, an infectious process such as herpes encephalitis cannot be excluded in the correct clinical setting.      JEMIMA CHU MD       Electronically Signed and Approved By: JEMIMA CHU MD on 2/26/2024 at 11:39             US Liver    Result Date: 2/26/2024    No acute cholecystitis.  No gallstones.  No biliary ductal dilatation.  Diffuse hepatic steatosis is seen with hepatomegaly.  There is suspected  splenomegaly.  The other findings are as detailed above.     Please note that portions of this note were completed with a voice recognition program.  MANDA YEPEZ JR, MD       Electronically Signed and Approved By: MANDA YEPEZ JR, MD on 2/26/2024 at 6:46              XR Chest 1 View    Result Date: 2/26/2024   No acute cardiopulmonary disease is seen radiographically.     Please note that portions of this note were completed with a voice recognition program.  MANDA YEPEZ JR, MD       Electronically Signed and Approved By: MANDA YEPEZ JR, MD on 2/26/2024 at 4:20              CT Angiogram Neck    Result Date: 2/26/2024     1. No hemodynamically significant arterial stenoses are seen.   2. No emergent large vessel occlusion.   3. No acute arterial dissection.   4. The vertebral arteries are patent and codominant.      Please note that portions of this note were completed with a voice recognition program.  MANDA YEPEZ JR, MD       Electronically Signed and Approved By: MANDA YEPEZ JR, MD on 2/26/2024 at 4:01              CT Angiogram Head w AI Analysis of LVO    Result Date: 2/26/2024     1. The study is limited.   2. No definite emergent large vessel occlusion is identified.   3. 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  No other definite cerebral aneurysms are seen.   4. No hemodynamically significant arterial stenoses are identified.  5. Please see above comments for further detail.     Please note that portions of this note were completed with a voice recognition program.  MANDA YEPEZ JR, MD       Electronically Signed and Approved By: MANDA YEPEZ JR, MD on 2/26/2024 at 3:53              CT CEREBRAL PERFUSION WITH & WITHOUT CONTRAST    Result Date: 2/26/2024    The study is nondiagnostic due to the degree of patient motion.     Please note that portions of this note were completed with  a voice recognition program.  MANDA YEPEZ JR, MD       Electronically Signed and Approved By: MANDA YEPEZ JR, MD on 2/26/2024 at 3:41              CT Head Without Contrast Stroke Protocol    Result Date: 2/26/2024   No acute brain abnormality is seen. Specifically, no acute intracranial hemorrhage, no acute infarct, no significant intracranial mass lesion, and no acute intracranial mass effect are appreciated.  Please see above comments for further detail.    Please note that portions of this note were completed with a voice recognition program.  MANDA YEPEZ JR, MD       Electronically Signed and Approved By: MANDA YEPEZ JR, MD on 2/26/2024 at 2:31                        Results for orders placed during the hospital encounter of 02/26/24    Adult Transthoracic Echo Complete W/ Cont if Necessary Per Protocol (With Agitated Saline)    Interpretation Summary    Calculated left ventricular EF = 56.2%    No obvious wall motion abnormalities    No obvious valvular abnormalities    Negative bubble study for R-->L shunt      Labs Pending at Discharge:        Time spent on Discharge including face to face service:  ~35 minutes    Electronically signed by León Jimenez MD, 02/27/24, 4:50 PM EST.

## 2024-02-27 NOTE — PAYOR COMM NOTE
"Ahsan Moran (44 y.o. Male)       Date of Birth   1980    Social Security Number       Address   [address] [place] [address]    Home Phone       MRN   [id number]       Denominational   Religion    Marital Status                               Admission Date   2/26/24    Admission Type   Emergency    Admitting Provider   Sae Kemp MD    Attending Provider   León Jimenez MD    Department, Room/Bed   [facility] UNIT, 214/1       Discharge Date       Discharge Disposition       Discharge Destination                                 Attending Provider: León Jimenez MD    Allergies: No Known Allergies    Isolation: None   Infection: None   Code Status: CPR    Ht: 182.9 cm (72\")   Wt: 121 kg (267 lb 3.2 oz)    Admission Cmt: None   Principal Problem: TIA (transient ischemic attack) [G45.9]                   Active Insurance as of 2/26/2024       Primary Coverage       Payor Plan Insurance Group Employer/Plan Group    [facility] [employer] [facility]        Payor Plan Address Payor Plan Phone Number Payor Plan Fax Number Effective Dates    [address] [phone]  1/1/2024 - None Entered    [facility] [id number]         Subscriber Name Subscriber Birth Date Member ID       AHSAN MORAN 1980 [id number]                     Emergency Contacts        (Rel.) Home Phone Work Phone Mobile Phone    NATHAN LAY (Friend) -- -- [phone]             Transient Ischemic Attack (TIA) [facility]       Indications Met   Last updated by Mala Beckwith RN on 2/26/2024 1103     Review Status Created By   Primary Completed Mala Beckwith RN      Criteria Review   Transient Ischemic Attack (TIA) [facility]     Overall Determination: Indications Met     Criteria:  [×] Admission is indicated for  1 or more  of the following :      [  ] Hemodynamic instability          2/26/2024 11:03 AM              -- 2/26/2024 11:03 AM by Mala Beckwith, " RN --                  /141                                                        NIH SCORE-1      [×] Altered mental status          2/26/2024 11:03 AM              -- 2/26/2024 11:03 AM by Mala Beckwith RN --                                    (X) Altered mental status (ie, different from baseline), as indicated by  1 or more  of the following  (1) (2) (3) (4):                  (X) Confusional state (eg, disorientation, difficulty following commands, deficit in attention)          2/26/2024 11:03 AM              -- 2/26/2024 11:03 AM by Mala Beckwith RN --                  AMS-WORD FINDING DIFFICULTY-L SIDED NUMBNESS     Notes:  -- 2/26/2024 11:03 AM by Mala Beckwith RN --                  Assessment / Plan            Assessment/Plan:       Left-sided numbness/tingling-resolved      Expressive aphasia-resolved      Concern for TIA      Hyperbilirubinemia      Paroxysmal A-fib, not on anticoagulation      Hypertension      Chronic alcohol use, at risk for alcohol withdrawal            Plan      Admit inpatient, telemetry for 48 hours      Neurochecks per protocol      MRI brain without contrast      Cardiac echo      Permissive hypertension, treat with as needed antihypertensives if her blood pressure is greater than 220/120 or symptomatic. If blood pressure is greater than 220/120 give labetalol PO or IV or Vasotec IV with a goal of 15% reduction in BP during the first 24 hours.      A1c, lipid panel,      Aspirin loading dose followed by aspirin 81 mg daily      Plavix 300 mg loading dose followed by 75 mg daily for 21 days      Teleneurology follow-up      CIWA protocol      IV thiamine      Folic acid      Check vitamin B12 levels      Nursing dysphagia screen      Heart healthy diet      Monitor LFTs, bilirubin level with a.m. labs. Elevation could likely secondary to chronic alcohol use. Ultrasound abdomen to evaluate for cirrhosis      Fall precautions      PT OT consult                                   History & Physical        Sae Kemp MD at [phone]           [facility]  [facility]   Patient Name: Ahsan Moran  : 1980  MRN: [id number]  Primary Care Physician:  Provider, No Known  Date of admission: 2024    Subjective  Subjective     Chief Complaint: Left-sided numbness/tingling, unable to express words    HPI:    Ahsan Moran is a 44 y.o. male presented to the ED for evaluation of left-sided numbness/tingling, unable to express thoughts that started approximately 1.5 hours prior to arrival to the ED.  Patient was sitting in the couch when he suddenly noticed tingling sensation/numbness on the left side of his body, unable to express his words.  Patient was able to think through but was not able to express his thoughts. No focal weakness.  Immediately called EMS and again brought into the ED.  By the time patient has come to the ED symptoms started to improve and has currently resolved during my evaluation.  No similar episodes in the past.  In 2016 patient was noted to have some palpitations and went to the ED and was noted to be hypertensive and diagnosed with paroxysmal A-fib.  Not on anticoagulation.  Denies any lightheadedness, dizziness, focal weakness, chest pain, shortness of breath, abdominal pain, diarrhea.    Patient has a long history of alcohol intake, drinks 6-8 beers, up to 6 times a week.  Smokeless tobacco.  Denies using any other recreational drugs.  Last alcohol drink was on Saturday.    In the ED, vital signs temperature 98.2, pulse 112, respiratory rate 14, blood pressure 205/126 on room air saturating at 99%.  Labs showed sodium 132, potassium 3.3, chloride 97, bicarb 16.5, anion gap 18.5, AST/ALT 67/37 total bili 2.1, rest of the CMP with no significant findings, normal CBC.  Chest x-ray showed no acute abnormality.  EKG showed sinus rhythm.  CT head without contrast showed no acute abnormality.   CTP brain inconclusive due to motion artifact., CTA head and neck showed no large vessel occlusion.  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 patient has been evaluated by teleneurologist recommended admission for further evaluation of TIA.      Personal History     Past Medical History:   Diagnosis Date    A-fib     Hypertension          History reviewed. No pertinent surgical history.      History reviewed. No pertinent family history.      Social History     Socioeconomic History    Marital status:    Tobacco Use    Smoking status: Never    Smokeless tobacco: Current     Types: Snuff   Vaping Use    Vaping Use: Never used   Substance and Sexual Activity    Alcohol use: Yes     Alcohol/week: 40.0 standard drinks of alcohol     Types: 40 Cans of beer per week    Drug use: Not Currently    Sexual activity: Defer         Home Medications:       Allergies:  No Known Allergies    Review of Systems   All other systems reviewed and negative except as mentioned above in the HPI    Objective  Objective     Vitals:   Temp:  [98.2 °F (36.8 °C)] 98.2 °F (36.8 °C)  Heart Rate:  [] 93  Resp:  [14] 14  BP: (162-216)/(110-153) 182/120    Physical Exam    Constitutional: Awake, alert, no acute distress   Eyes: Pupils equal, sclerae anicteric, no conjunctival injection   HENT: NCAT, mucous membranes moist   Respiratory: Clear to auscultation bilaterally, nonlabored respirations    Cardiovascular: RRR, no murmurs   Gastrointestinal: Positive bowel sounds, soft, nontender, nondistended   Musculoskeletal: No bilateral ankle edema, no clubbing or cyanosis to extremities   Psychiatric: Appropriate affect, cooperative   Neurologic: Oriented x 3, strength 5/5 all extremities, sensation equal and intact bilaterally to light touch, cranial nerves II to XII intact speech clear       Result Review   Result Review:  I  have personally reviewed the results from the time of this admission to 2/26/2024 04:10 EST and agree with these findings:  [x]  Laboratory  []  Microbiology  [x]  Radiology  [x]  EKG/Telemetry   []  Cardiology/Vascular   []  Pathology  []  Old records  []  Other:        Imaging Results (Last 24 Hours)       Procedure Component Value Units Date/Time    CT Angiogram Neck [[id number]] Collected: 02/26/24 0401     Updated: 02/26/24 0404    Narrative:      PROCEDURE: CT ANGIOGRAM NECK     COMPARISON: 2/26/2024.     INDICATIONS: Neuro. deficit, acute; possible acute ischemic stroke (AIS).     PROTOCOL:   Standard CTA imaging protocol performed.      RADIATION:   Total DLP: 812.7 mGy*cm.    Automated exposure control was utilized to minimize radiation dose.   CONTRAST: 80 mL Isovue 370 I.V.  LABS:   Not provided.  RAPID: CTA imaging was analyzed using RAPID AI to enable computer assisted triage notification to   rapidly detect a large vessel occlusion (LVO) and shorten notification time.     TECHNIQUE:   After obtaining the patient's consent, 909 CT/CTA images of the neck were obtained with intravenous   contrast.  Multi-planar/3-D imaging was created and interpreted to optimize visualization of   arterial anatomy.  Unless otherwise stated in this report, all arterial luminal stenoses involving   the internal carotid arteries are derived by dividing the lesion diameter by the diameter of the   normal internal carotid artery more distally (that is, using NASCET criteria).  Despite best   efforts, poor image quality limits interpretation of the study, especially due to a limited   contrast bolus within the intracranial arteries.  There is also motion artifact on the study.    Also, the study is limited in that there is incomplete visualization of the aortic arch.  Also, the   origins of the great arteries are not completely visualized.     FINDINGS:      LEFT  INTERNAL CAROTID: No hemodynamically significant stenosis or  dissection.    EXTERNAL CAROTID: No hemodynamically significant stenosis or dissection.    COMMON CAROTID: No hemodynamically significant stenosis or dissection.    VERTEBRAL: No hemodynamically significant stenosis or dissection.  The vertebral arteries are   probably codominant.     RIGHT  INTERNAL CAROTID: No hemodynamically significant stenosis or dissection.    EXTERNAL CAROTID: No hemodynamically significant stenosis or dissection.    COMMON CAROTID: No hemodynamically significant stenosis or dissection.    VERTEBRAL: No hemodynamically significant stenosis or dissection.  The vertebral arteries are   probably codominant.     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  Disc and endplate degenerative changes are present at C4-5.    To a lesser extent, degenerative changes are seen elsewhere within the cervical spine.  Streak   artifact from dental amalgam obscures detail on the exam.  There is venous contamination on the   study, especially on the left, obscuring the left subclavian artery.  Again, the study is   motion-limited.  Nonspecific small to moderate sized bilateral cervical lymph nodes are   appreciated.       Impression:            1. No hemodynamically significant arterial stenoses are seen.       2. No emergent large vessel occlusion.       3. No acute arterial dissection.       4. The vertebral arteries are patent and codominant.                Please note that portions of this note were completed with a voice recognition program.     MANDA YEPEZ JR, MD         Electronically Signed and Approved By: MANDA YEPEZ JR, MD on 2/26/2024 at 4:01                        CT Angiogram Head w AI Analysis of LVO [[id number]] Collected: 02/26/24 0353     Updated: 02/26/24 0356    Narrative:      PROCEDURE: CT ANGIOGRAM HEAD W AI ANALYSIS OF LVO     COMPARISON: 2/26/2024.     INDICATIONS: Neuro.  deficit, acute; possible acute ischemic stroke (AIS).     PROTOCOL:   Standard imaging protocol performed       RADIATION:   DLP: 812.7 mGy*cm    Automated exposure control was utilized to minimize radiation dose.   CONTRAST: 80 cc Isovue 370 I.V.  LABS:   Not provided.  RAPID: CTA imaging was analyzed using RAPID AI to enable computer assisted triage notification to   rapidly detect a large vessel occlusion (LVO) and shorten notification time.     TECHNIQUE:   After obtaining the patient's consent, 909 CT/CTA images of the head were obtained with non-ionic   contrast, and multi-planar/3-D imaging were created and interpreted to optimize visualization of   arterial anatomy.  Despite best efforts, poor image quality limits interpretation of the study,   especially due to a limited contrast bolus within the intracranial arteries.  There is also motion   artifact on the study.     FINDINGS:   Again, the study is limited.  Grossly, no emergent large vessel occlusion is appreciated.  There is   a suspected 2 mm saccular cerebral aneurysm involving the right lateral proximal right A2 segment,   such as seen on image 242 of series 401.  It is also seen on image 1 of series 1002 and image 3 of   series 1001 and adjacent images.  No other definite cerebral aneurysms are seen.  The bulbous   appearance of the terminus of the basilar artery is thought to represent a normal variant rather   than to represent an infundibulum or basilar tip aneurysm.  The A1 segment on the right is   hypoplastic, which is thought be a normal variant.  The intradural (V4 segment) right vertebral   artery is minimally larger in caliber than the intradural left vertebral artery.  No   hemodynamically significant arterial stenoses are suggested.  The posterior communicating arteries   are not well seen and may be hypoplastic.  The right posterior inferior cerebellar artery (PICA) is   not well seen.  There may be a dominant right-sided anterior inferior cerebellar artery (AICA) and   a dominant left-sided PICA, as with a normal variant.  Please see the  nonenhanced head CT exam   report from earlier during the same day for further detail regarding additional intracranial   findings.         Impression:            1. The study is limited.       2. No definite emergent large vessel occlusion is identified.       3. There is a suspected 2 mm saccular aneurysm involving the right lateral aspect of the proximal   A2 segment of the right anterior cerebral artery (RANI).  No other definite cerebral aneurysms are   seen.       4. No hemodynamically significant arterial stenoses are identified.     5. Please see above comments for further detail.             Please note that portions of this note were completed with a voice recognition program.     MANDA YEPEZ JR, MD         Electronically Signed and Approved By: MANDA YEPEZ JR, MD on 2/26/2024 at 3:53                        CT CEREBRAL PERFUSION WITH & WITHOUT CONTRAST [[id number]] Collected: 02/26/24 0341     Updated: 02/26/24 0344    Narrative:      PROCEDURE: CT CEREBRAL PERFUSION W WO CONTRAST     COMPARISONS: 2/26/2024.     INDICATIONS: Neuro. deficit, acute, stroke suspected.     PROTOCOL:   Standard CTP imaging protocol performed.      RADIATION:   Total DLP: 1301.4 mGy*cm.    Automated exposure control was utilized to minimize radiation dose.   CONTRAST: Not provided (? 80 mL).  LABS:   Not provided.     TECHNIQUE:    CT perfusion (CTP) was performed, utilizing a total of 80 mL of nonionic intravenous contrast agent   (as indicated above) with an injection rate of 5 mL/s.  A total of 8 cm of brain coverage (acquired   in two 4 cm contiguous labs) was used for the CTP study.  The images were processed using [employer]   software (by [employer] [place], [place]).  The CTP maps, including the cerebral blood volume   (CBV), cerebral blood flow (CBF), mean transit time (MTT), time to maximum (Tmax), and time to peak   (TTP), were reviewed as well as the summary map(s).  The examination appears to be technically    adequate.  There arterial input function (AIF) and the venous output function (VOF) appear to be   appropriately placed.  Despite best efforts, poor image quality limits interpretation of the study   especially related to motion artifact.     FINDINGS:   The study is nondiagnostic due to the degree of patient motion.       Impression:         The study is nondiagnostic due to the degree of patient motion.              Please note that portions of this note were completed with a voice recognition program.     MANDA YEPEZ JR, MD         Electronically Signed and Approved By: MANDA YEPEZ JR, MD on 2/26/2024 at 3:41                        XR Chest 1 View [[id number]] Resulted: 02/26/24 0303     Updated: 02/26/24 0303    CT Head Without Contrast Stroke Protocol [[id number]] Collected: 02/26/24 0231     Updated: 02/26/24 0234    Narrative:      PROCEDURE: CT HEAD WO CONTRAST STROKE PROTOCOL     COMPARISON: None.     INDICATIONS: Neuro deficit, acute, stroke suspected.     PROTOCOL:   Standard CT imaging protocol performed.      RADIATION:   Total DLP: 1,081.2 mGy*cm.    MA and/or KV were/was adjusted to minimize radiation dose.      TECHNIQUE: After obtaining the patient's consent, 528 multi-planar CT images were obtained without   non-ionic intravenous contrast material.      DISCUSSION:   A routine nonenhanced head CT was performed.  No acute brain abnormality is   identified.  No acute intracranial hemorrhage.  No acute infarct is seen.  No acute skull fracture.    No midline shift or acute intracranial mass effect is seen.  The ventricular size and   configuration are within normal limits.  Diffuse prominence of the extra-axial spaces is seen,   suggesting global atrophy for the patient's age.  Chronic bilateral nasal bone fractures are   suggested.  There is suspected chronic sinus disease, especially involving the right maxillary   antrum.  The imaged middle ear clefts (that is, the bilateral mastoid air cell  complexes, bilateral   middle ear cavities, and bilateral external auditory canals) are well aerated.  No definite acute   orbital findings are suggested.  Probably benign retrocerebellar cystic structures are seen, likely   related to arachnoid cysts, roughly estimated at 3.9 x 2.8 x 3.3 cm in transverse, anteroposterior   (AP), and craniocaudal extent, respectively, as seen on image 28 of series 201 and image 49 of   series 203 and adjacent images.       Impression:       No acute brain abnormality is seen. Specifically, no acute intracranial hemorrhage,   no acute infarct, no significant intracranial mass lesion, and no acute intracranial mass effect   are appreciated.  Please see above comments for further detail.          Please note that portions of this note were completed with a voice recognition program.     MANDA YEPEZ JR, MD         Electronically Signed and Approved By: MANDA YEPEZ JR, MD on 2/26/2024 at 2:31                                       Assessment & Plan  Assessment / Plan     Assessment/Plan:   Left-sided numbness/tingling-resolved  Expressive aphasia-resolved  Concern for TIA  Hyperbilirubinemia  Paroxysmal A-fib, not on anticoagulation  Hypertension  Chronic alcohol use, at risk for alcohol withdrawal    Plan  Admit inpatient, telemetry for 48 hours  Neurochecks per protocol  MRI brain without contrast  Cardiac echo  Permissive hypertension, treat with as needed antihypertensives if her blood pressure is greater than 220/120 or symptomatic. If blood pressure is greater than 220/120 give labetalol PO or IV or Vasotec IV with a goal of 15% reduction in BP during the first 24 hours.  A1c, lipid panel,  Aspirin loading dose followed by aspirin 81 mg daily  Plavix 300 mg loading dose followed by 75 mg daily for 21 days  Teleneurology follow-up  CIWA protocol  IV thiamine  Folic acid  Check vitamin B12 levels  Nursing dysphagia screen  Heart healthy diet  Monitor LFTs, bilirubin level with  a.m. labs.  Elevation could likely secondary to chronic alcohol use.  Ultrasound abdomen to evaluate for cirrhosis  Fall precautions  PT OT consult    DVT prophylaxis:   Subcutaneous heparin    CODE STATUS:    Level Of Support Discussed With: Patient  Code Status (Patient has no pulse and is not breathing): CPR (Attempt to Resuscitate)  Medical Interventions (Patient has pulse or is breathing): Full Support      Admission Status:  I believe this patient meets observation status.    Part of this note may be an electronic transcription/translation of spoken language to printed text using the Dragon Dictation System    Sae Kemp MD               Electronically signed by Sae Kemp MD at 02/26/24 0411          Emergency Department Notes        Gregoria Olivera MD at 02/26/24 0238          Time: 2:38 AM EST  Date of encounter:  2/26/2024  Independent Historian/Clinical History and Information was obtained by:   Patient    History is limited by: N/A    Chief Complaint: AMS, Numbness      History of Present Illness:  Patient is a 44 y.o. year old male who presents to the emergency department for evaluation of Altered mental status, word finding difficulty and left-sided numbness.  He denies any weakness.  Symptoms started approximate hour and a half ago.  Patient states he was sitting on the couch at onset of symptoms.  Numbness is since resolved.  He states he does have a history of intermittent atrial fibrillation.  He is not on any anticoagulation at this time.    HPI    Patient Care Team  Primary Care Provider: Provider, No Known    Past Medical History:     No Known Allergies  Past Medical History:   Diagnosis Date    A-fib     Hypertension      History reviewed. No pertinent surgical history.  History reviewed. No pertinent family history.    Home Medications:  Prior to Admission medications    Not on File        Social History:   Social History     Tobacco Use    Smoking status: Never     "Smokeless tobacco: Current     Types: Snuff   Vaping Use    Vaping Use: Never used   Substance Use Topics    Alcohol use: Yes     Alcohol/week: 40.0 standard drinks of alcohol     Types: 40 Cans of beer per week    Drug use: Not Currently         Review of Systems:  Review of Systems   Constitutional:  Negative for chills and fever.   HENT:  Negative for congestion, ear pain and sore throat.    Eyes:  Negative for pain.   Respiratory:  Negative for cough, chest tightness and shortness of breath.    Cardiovascular:  Negative for chest pain.   Gastrointestinal:  Negative for abdominal pain, diarrhea, nausea and vomiting.   Genitourinary:  Negative for flank pain and hematuria.   Musculoskeletal:  Negative for joint swelling.   Skin:  Negative for pallor.   Neurological:  Positive for speech difficulty and numbness. Negative for seizures and headaches.   Psychiatric/Behavioral:  Positive for confusion.    All other systems reviewed and are negative.       Physical Exam:  BP (!) 162/111 (Patient Position: Lying)   Pulse 93   Temp 98.2 °F (36.8 °C) (Oral)   Resp 13   Ht 182.9 cm (72\")   Wt 121 kg (267 lb 3.2 oz)   SpO2 95%   BMI 36.24 kg/m²     Physical Exam  Constitutional:       Appearance: Normal appearance.   HENT:      Head: Normocephalic and atraumatic.      Nose: Nose normal.      Mouth/Throat:      Mouth: Mucous membranes are moist.   Eyes:      Extraocular Movements: Extraocular movements intact.      Conjunctiva/sclera: Conjunctivae normal.      Pupils: Pupils are equal, round, and reactive to light.   Cardiovascular:      Rate and Rhythm: Normal rate and regular rhythm.      Pulses: Normal pulses.      Heart sounds: Normal heart sounds.   Pulmonary:      Effort: Pulmonary effort is normal.      Breath sounds: Normal breath sounds.   Abdominal:      General: There is no distension.      Palpations: Abdomen is soft.      Tenderness: There is no abdominal tenderness.   Musculoskeletal:         General: " Normal range of motion.      Cervical back: Normal range of motion.   Skin:     General: Skin is warm and dry.      Capillary Refill: Capillary refill takes less than 2 seconds.   Neurological:      General: No focal deficit present.      Mental Status: He is alert and oriented to person, place, and time. Mental status is at baseline.   Psychiatric:         Mood and Affect: Mood normal.         Behavior: Behavior normal.                  Procedures:  Procedures      Medical Decision Making:      Comorbidities that affect care:    Atrial Fibrillation, Hypertension    External Notes reviewed:    None      The following orders were placed and all results were independently analyzed by me:  Orders Placed This Encounter   Procedures    CT Head Without Contrast Stroke Protocol    CT Angiogram Head w AI Analysis of LVO    CT Angiogram Neck    CT CEREBRAL PERFUSION WITH & WITHOUT CONTRAST    XR Chest 1 View    MRI Brain Without Contrast    US Abdomen Complete    [place] Draw    Comprehensive Metabolic Panel    Protime-INR    aPTT    Single High Sensitivity Troponin T    Urinalysis With Microscopic If Indicated (No Culture) - Urine, Clean Catch    CBC Auto Differential    Lactic Acid, Plasma    Hemoglobin A1c    Lipid Panel    Urinalysis, Microscopic Only - Urine, Clean Catch    Diet: Cardiac Diets; Healthy Heart (2-3 Na+); Texture: Regular Texture (IDDSI 7); Fluid Consistency: Thin (IDDSI 0)    Initiate Department's Acute Stroke Process (Team D, Code 19, etc)    Perform NIH Stroke Scale    Measure Actual Weight    Head of Bed 30 Degrees or Less    Undress and Gown    Vital Signs    Neuro Checks    Notify MD for SBP < 80 or > 200    Notify Provider for SBP greater than 140 if hemorrhagic Stroke    No Hypotonic Fluids    Nursing Dysphagia Screening (Complete Prior to Giving anything PO)    RN to Place Order SLP Consult (IF swallow screen failed) - Eval & Treat Choosing Reason of RN Dysphagia Screen Failed    Nursing Dysphagia  Screening (Complete Prior to Giving Anything By Mouth)    Vital Signs    Telemetry - Place Orders & Notify Provider of Results When Patient Experiences Acute Chest Pain, Dysrhythmia or Respiratory Distress    Notify Provider    Nursing Dysphagia Screening (Complete Prior to Giving Anything By Mouth)    RN to Place Order SLP Consult - Eval & Treat Choosing Reason of RN Dysphagia Screen Failed    Nurse to Call MD or Nutrition Services for Diet if Patient Passes Dysphagia Screen    Intake and Output    Neuro Checks    NIHSS Assessment    Order CT Head Without Contrast for Neurological Decline    Provide Stroke Education Material    Saline Lock & Maintain IV Access    Place Sequential Compression Device    Maintain Sequential Compression Device    Activity - Strict Bed Rest with HOB Flat    Vital Signs    Continuous Pulse Oximetry    Obtain Baseline Clinical [place] Withdrawal Assessment - Ar (CIWA-Ar), Sedation Scale & Vital Signs    Clinical [place] Withdrawal Assessment (CIWA-Ar)    If CIWA-Ar Score Less Than 8 For 3 Consecutive Assessments, Monitor Every 4 Hours & Discontinue Assessment When CIWA-Ar Less Than 8 for 24 Hours    Obtain Pre & Post Sedation Scores With Every Lorazepam Dose - Hold For POSS Greater Than 2 or RASS of -3 or Less    Notify Provider - Withdrawal    Notify Provider of Abnormal Lab Results    Notify Provider - Vitals    Code Status and Medical Interventions:    Inpatient Hospitalist Consult    Inpatient Case Management  Consult    Inpatient Diabetes Educator Consult    Inpatient Neurology Consult Stroke    OT Consult: Eval & Treat    PT Consult: Eval & Treat As Tolerated    Oxygen Therapy- Nasal Cannula; Titrate 1-6 LPM Per SpO2; 90 - 95%    SLP Consult: Eval & Treat Communication Disorder    POC Glucose Once    POC Glucose Once    POC Glucose Q6H    POC Glucose Once    ECG 12 Lead ED Triage Standing Order; Acute Stroke (Onset <12 hrs)    Adult Transthoracic Echo Complete W/  Cont if Necessary Per Protocol (With Agitated Saline)    Insert Large Bore Peripheral IV - Right AC Preferred    Insert Peripheral IV    Inpatient Admission    Seizure Precautions    Safety Precautions    CBC & Differential    Green Top (Gel)    Lavender Top    Gold Top - SST    Light Blue Top    Extra Tubes    Lavender Top       Medications Given in the Emergency Department:  Medications   sodium chloride 0.9 % flush 10 mL (has no administration in time range)   sodium chloride 0.9 % flush 10 mL (has no administration in time range)   sodium chloride 0.9 % flush 10 mL (10 mL Intravenous Given 2/26/24 0516)   sodium chloride 0.9 % infusion 40 mL (has no administration in time range)   atorvastatin (LIPITOR) tablet 80 mg (has no administration in time range)   aspirin chewable tablet 81 mg (has no administration in time range)     Or   aspirin suppository 300 mg (has no administration in time range)   clopidogrel (PLAVIX) tablet 300 mg (300 mg Oral Given 2/26/24 0505)     And   clopidogrel (PLAVIX) tablet 75 mg (has no administration in time range)   thiamine (B-1) injection 200 mg (200 mg Intravenous Given 2/26/24 0515)     Followed by   thiamine (VITAMIN B-1) tablet 100 mg (has no administration in time range)   folic acid (FOLVITE) tablet 1 mg (has no administration in time range)   LORazepam (ATIVAN) tablet 1 mg (has no administration in time range)     Or   diazePAM (VALIUM) injection 2.5 mg (has no administration in time range)     Or   LORazepam (ATIVAN) tablet 2 mg (has no administration in time range)     Or   diazePAM (VALIUM) injection 5 mg (has no administration in time range)     Or   diazePAM (VALIUM) injection 5 mg (has no administration in time range)     Or   LORazepam (ATIVAN) tablet 4 mg (has no administration in time range)     Or   diazePAM (VALIUM) injection 10 mg (has no administration in time range)   pantoprazole (PROTONIX) EC tablet 40 mg (40 mg Oral Given 2/26/24 0515)   heparin (porcine)  5000 UNIT/ML injection 5,000 Units (5,000 Units Subcutaneous Given 2/26/24 0515)   iopamidol (ISOVUE-370) 76 % injection 150 mL (150 mL Intravenous Given 2/26/24 0228)   ondansetron (ZOFRAN) injection 4 mg (4 mg Intravenous Given 2/26/24 0310)   labetalol (NORMODYNE,TRANDATE) injection 10 mg (10 mg Intravenous Given 2/26/24 0230)   potassium chloride (MICRO-K/KLOR-CON) CR capsule (40 mEq Oral Given 2/26/24 0346)   aspirin EC tablet 325 mg (325 mg Oral Given 2/26/24 0509)        ED Course:         Labs:    Lab Results (last 24 hours)       Procedure Component Value Units Date/Time    CBC & Differential [[id number]]  (Abnormal) Collected: 02/26/24 0206    Specimen: Blood Updated: 02/26/24 0244    Narrative:      The following orders were created for panel order CBC & Differential.  Procedure                               Abnormality         Status                     ---------                               -----------         ------                     CBC Auto Differential[[id number]]        Abnormal            Final result                 Please view results for these tests on the individual orders.    Comprehensive Metabolic Panel [[id number]]  (Abnormal) Collected: 02/26/24 0206    Specimen: Blood Updated: 02/26/24 0301     Glucose 133 mg/dL      BUN 13 mg/dL      Creatinine 0.96 mg/dL      Sodium 132 mmol/L      Potassium 3.3 mmol/L      Comment: Slight hemolysis detected by analyzer. Result may be falsely elevated.        Chloride 97 mmol/L      CO2 16.5 mmol/L      Calcium 9.2 mg/dL      Total Protein 7.5 g/dL      Albumin 4.2 g/dL      ALT (SGPT) 37 U/L      AST (SGOT) 67 U/L      Comment: Slight hemolysis detected by analyzer. Result may be falsely elevated.        Alkaline Phosphatase 100 U/L      Total Bilirubin 2.1 mg/dL      Globulin 3.3 gm/dL      A/G Ratio 1.3 g/dL      BUN/Creatinine Ratio 13.5     Anion Gap 18.5 mmol/L      eGFR 99.3 mL/min/1.73     Narrative:      GFR Normal >60  Chronic Kidney Disease  <60  Kidney Failure <15      Protime-INR [[id number]]  (Normal) Collected: 02/26/24 0206    Specimen: Blood Updated: 02/26/24 0221     Protime 13.0 Seconds      INR 0.96    Narrative:      Suggested Therapeutic Ranges For Oral Anticoagulant Therapy:  Level of Therapy                      INR Target Range  Standard Dose                            2.0-3.0  High Dose                                2.5-3.5  Patients not receiving anticoagulant  Therapy Normal Range                     0.86-1.15    aPTT [[id number]]  (Normal) Collected: 02/26/24 0206    Specimen: Blood Updated: 02/26/24 0221     PTT 25.5 seconds     Single High Sensitivity Troponin T [[id number]]  (Normal) Collected: 02/26/24 0206    Specimen: Blood Updated: 02/26/24 0257     HS Troponin T 7 ng/L     Narrative:      High Sensitive Troponin T Reference Range:  <14.0 ng/L- Negative Female for AMI  <22.0 ng/L- Negative Male for AMI  >=14 - Abnormal Female indicating possible myocardial injury.  >=22 - Abnormal Male indicating possible myocardial injury.   Clinicians would have to utilize clinical acumen, EKG, Troponin, and serial changes to determine if it is an Acute Myocardial Infarction or myocardial injury due to an underlying chronic condition.         CBC Auto Differential [[id number]]  (Abnormal) Collected: 02/26/24 0206    Specimen: Blood Updated: 02/26/24 0244     WBC 8.71 10*3/mm3      RBC 5.22 10*6/mm3      Hemoglobin 16.3 g/dL      Hematocrit 44.1 %      MCV 84.5 fL      MCH 31.2 pg      MCHC 37.0 g/dL      RDW 11.5 %      RDW-SD 34.8 fl      MPV 10.0 fL      Platelets 173 10*3/mm3      Neutrophil % 75.5 %      Lymphocyte % 14.4 %      Monocyte % 9.2 %      Eosinophil % 0.1 %      Basophil % 0.5 %      Immature Grans % 0.3 %      Neutrophils, Absolute 6.58 10*3/mm3      Lymphocytes, Absolute 1.25 10*3/mm3      Monocytes, Absolute 0.80 10*3/mm3      Eosinophils, Absolute 0.01 10*3/mm3      Basophils, Absolute 0.04 10*3/mm3      Immature Grans,  Absolute 0.03 10*3/mm3      nRBC 0.0 /100 WBC     Lactic Acid, Plasma [[id number]]  (Normal) Collected: 02/26/24 0350    Specimen: Blood Updated: 02/26/24 0412     Lactate 1.3 mmol/L     POC Glucose Once [[id number]]  (Abnormal) Collected: 02/26/24 0354    Specimen: Blood Updated: 02/26/24 0356     Glucose 110 mg/dL      Comment: Serial Number: [id number]:  [id number]       Urinalysis With Microscopic If Indicated (No Culture) - Urine, Clean Catch [[id number]]  (Abnormal) Collected: 02/26/24 0356    Specimen: Urine, Clean Catch Updated: 02/26/24 0412     Color, UA Yellow     Appearance, UA Clear     pH, UA >=9.0     Specific Gravity, UA >1.030     Glucose, UA Negative     Ketones, UA Trace     Bilirubin, UA Negative     Blood, UA Negative     Protein, UA 30 mg/dL (1+)     Leuk Esterase, UA Trace     Nitrite, UA Negative     Urobilinogen, UA 1.0 E.U./dL    Urinalysis, Microscopic Only - Urine, Clean Catch [[id number]]  (Abnormal) Collected: 02/26/24 0356    Specimen: Urine, Clean Catch Updated: 02/26/24 0414     RBC, UA 3-5 /HPF      WBC, UA 11-20 /HPF      Bacteria, UA None Seen /HPF      Squamous Epithelial Cells, UA 0-2 /HPF      Hyaline Casts, UA 3-6 /LPF      Methodology Automated Microscopy    Hemoglobin A1c [[id number]] Collected: 02/26/24 0522    Specimen: Blood Updated: 02/26/24 0533    Lipid Panel [[id number]] Collected: 02/26/24 0522    Specimen: Blood Updated: 02/26/24 0533    POC Glucose Once [[id number]]  (Abnormal) Collected: 02/26/24 0524    Specimen: Blood Updated: 02/26/24 0526     Glucose 138 mg/dL      Comment: Serial Number: [id number]:  [id number]                Imaging:    XR Chest 1 View    Result Date: 2/26/2024  PROCEDURE: XR CHEST 1 VW  COMPARISON: None.  INDICATIONS: Acute Stroke Protocol (Onset < 12 hrs).  FINDINGS: A single frontal (AP or PA upright portable) chest radiograph reveals no cardiac enlargement and no acute infiltrate. No pneumothorax is seen.  External artifacts obscure  detail.  The imaged airway is patent and midline.       No acute cardiopulmonary disease is seen radiographically.     Please note that portions of this note were completed with a voice recognition program.  MANDA YEPEZ JR, MD       Electronically Signed and Approved By: MANDA YEPEZ JR, MD on 2/26/2024 at 4:20              CT Angiogram Neck    Result Date: 2/26/2024  PROCEDURE: CT ANGIOGRAM NECK  COMPARISON: 2/26/2024.  INDICATIONS: Neuro. deficit, acute; possible acute ischemic stroke (AIS).  PROTOCOL:   Standard CTA imaging protocol performed.    RADIATION:   Total DLP: 812.7 mGy*cm.   Automated exposure control was utilized to minimize radiation dose. CONTRAST: 80 mL Isovue 370 I.V. LABS:   Not provided. RAPID: CTA imaging was analyzed using RAPID AI to enable computer assisted triage notification to rapidly detect a large vessel occlusion (LVO) and shorten notification time.  TECHNIQUE:  After obtaining the patient's consent, 909 CT/CTA images of the neck were obtained with intravenous contrast.  Multi-planar/3-D imaging was created and interpreted to optimize visualization of arterial anatomy.  Unless otherwise stated in this report, all arterial luminal stenoses involving the internal carotid arteries are derived by dividing the lesion diameter by the diameter of the normal internal carotid artery more distally (that is, using NASCET criteria).  Despite best efforts, poor image quality limits interpretation of the study, especially due to a limited contrast bolus within the intracranial arteries.  There is also motion artifact on the study.  Also, the study is limited in that there is incomplete visualization of the aortic arch.  Also, the origins of the great arteries are not completely visualized.  FINDINGS:   LEFT INTERNAL CAROTID: No hemodynamically significant stenosis or dissection.  EXTERNAL CAROTID: No hemodynamically significant stenosis or dissection.  COMMON CAROTID: No hemodynamically  significant stenosis or dissection.  VERTEBRAL: No hemodynamically significant stenosis or dissection.  The vertebral arteries are probably codominant.  RIGHT INTERNAL CAROTID: No hemodynamically significant stenosis or dissection.  EXTERNAL CAROTID: No hemodynamically significant stenosis or dissection.  COMMON CAROTID: No hemodynamically significant stenosis or dissection.  VERTEBRAL: No hemodynamically significant stenosis or dissection.  The vertebral arteries are probably codominant.  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  Disc and endplate degenerative changes are present at C4-5.  To a lesser extent, degenerative changes are seen elsewhere within the cervical spine.  Streak artifact from dental amalgam obscures detail on the exam.  There is venous contamination on the study, especially on the left, obscuring the left subclavian artery.  Again, the study is motion-limited.  Nonspecific small to moderate sized bilateral cervical lymph nodes are appreciated.         1. No hemodynamically significant arterial stenoses are seen.   2. No emergent large vessel occlusion.   3. No acute arterial dissection.   4. The vertebral arteries are patent and codominant.      Please note that portions of this note were completed with a voice recognition program.  MANDA YEPEZ JR, MD       Electronically Signed and Approved By: MANDA YEPEZ JR, MD on 2/26/2024 at 4:01              CT Angiogram Head w AI Analysis of LVO    Result Date: 2/26/2024  PROCEDURE: CT ANGIOGRAM HEAD W AI ANALYSIS OF LVO  COMPARISON: 2/26/2024.  INDICATIONS: Neuro.  deficit, acute; possible acute ischemic stroke (AIS).  PROTOCOL:   Standard imaging protocol performed    RADIATION:   DLP: 812.7 mGy*cm   Automated exposure control was utilized to minimize radiation dose. CONTRAST: 80 cc Isovue 370 I.V. LABS:   Not provided. RAPID: CTA imaging was analyzed using RAPID AI to enable computer assisted triage notification to rapidly detect a large  vessel occlusion (LVO) and shorten notification time.  TECHNIQUE:  After obtaining the patient's consent, 909 CT/CTA images of the head were obtained with non-ionic contrast, and multi-planar/3-D imaging were created and interpreted to optimize visualization of arterial anatomy.  Despite best efforts, poor image quality limits interpretation of the study, especially due to a limited contrast bolus within the intracranial arteries.  There is also motion artifact on the study.  FINDINGS:  Again, the study is limited.  Grossly, no emergent large vessel occlusion is appreciated.  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  It is also seen on image 1 of series 1002 and image 3 of series 1001 and adjacent images.  No other definite cerebral aneurysms are seen.  The bulbous appearance of the terminus of the basilar artery is thought to represent a normal variant rather than to represent an infundibulum or basilar tip aneurysm.  The A1 segment on the right is hypoplastic, which is thought be a normal variant.  The intradural (V4 segment) right vertebral artery is minimally larger in caliber than the intradural left vertebral artery.  No hemodynamically significant arterial stenoses are suggested.  The posterior communicating arteries are not well seen and may be hypoplastic.  The right posterior inferior cerebellar artery (PICA) is not well seen.  There may be a dominant right-sided anterior inferior cerebellar artery (AICA) and a dominant left-sided PICA, as with a normal variant.  Please see the nonenhanced head CT exam report from earlier during the same day for further detail regarding additional intracranial findings.          1. The study is limited.   2. No definite emergent large vessel occlusion is identified.   3. There is a suspected 2 mm saccular aneurysm involving the right lateral aspect of the proximal A2  segment of the right anterior cerebral artery (RANI).  No other definite cerebral aneurysms are seen.   4. No hemodynamically significant arterial stenoses are identified.  5. Please see above comments for further detail.     Please note that portions of this note were completed with a voice recognition program.  MANDA YEPEZ JR, MD       Electronically Signed and Approved By: MANDA YEPEZ JR, MD on 2/26/2024 at 3:53              CT CEREBRAL PERFUSION WITH & WITHOUT CONTRAST    Result Date: 2/26/2024  PROCEDURE: CT CEREBRAL PERFUSION W WO CONTRAST  COMPARISONS: 2/26/2024.  INDICATIONS: Neuro. deficit, acute, stroke suspected.  PROTOCOL:   Standard CTP imaging protocol performed.    RADIATION:   Total DLP: 1301.4 mGy*cm.   Automated exposure control was utilized to minimize radiation dose. CONTRAST: Not provided (? 80 mL). LABS:   Not provided.  TECHNIQUE:  CT perfusion (CTP) was performed, utilizing a total of 80 mL of nonionic intravenous contrast agent (as indicated above) with an injection rate of 5 mL/s.  A total of 8 cm of brain coverage (acquired in two 4 cm contiguous labs) was used for the CTP study.  The images were processed using [employer] software (by [employer] [facility]).  The CTP maps, including the cerebral blood volume (CBV), cerebral blood flow (CBF), mean transit time (MTT), time to maximum (Tmax), and time to peak (TTP), were reviewed as well as the summary map(s).  The examination appears to be technically adequate.  There arterial input function (AIF) and the venous output function (VOF) appear to be appropriately placed.  Despite best efforts, poor image quality limits interpretation of the study especially related to motion artifact.  FINDINGS:  The study is nondiagnostic due to the degree of patient motion.        The study is nondiagnostic due to the degree of patient motion.     Please note that portions of this note were completed with a voice recognition program.  MANDA BALES  LUCHO ROTH MD       Electronically Signed and Approved By: MANDA YEPEZ JR, MD on 2/26/2024 at 3:41              CT Head Without Contrast Stroke Protocol    Result Date: 2/26/2024  PROCEDURE: CT HEAD WO CONTRAST STROKE PROTOCOL  COMPARISON: None.  INDICATIONS: Neuro deficit, acute, stroke suspected.  PROTOCOL:   Standard CT imaging protocol performed.    RADIATION:   Total DLP: 1,081.2 mGy*cm.   MA and/or KV were/was adjusted to minimize radiation dose.    TECHNIQUE: After obtaining the patient's consent, 528 multi-planar CT images were obtained without non-ionic intravenous contrast material.  DISCUSSION:   A routine nonenhanced head CT was performed.  No acute brain abnormality is identified.  No acute intracranial hemorrhage.  No acute infarct is seen.  No acute skull fracture.  No midline shift or acute intracranial mass effect is seen.  The ventricular size and configuration are within normal limits.  Diffuse prominence of the extra-axial spaces is seen, suggesting global atrophy for the patient's age.  Chronic bilateral nasal bone fractures are suggested.  There is suspected chronic sinus disease, especially involving the right maxillary antrum.  The imaged middle ear clefts (that is, the bilateral mastoid air cell complexes, bilateral middle ear cavities, and bilateral external auditory canals) are well aerated.  No definite acute orbital findings are suggested.  Probably benign retrocerebellar cystic structures are seen, likely related to arachnoid cysts, roughly estimated at 3.9 x 2.8 x 3.3 cm in transverse, anteroposterior (AP), and craniocaudal extent, respectively, as seen on image 28 of series 201 and image 49 of series 203 and adjacent images.       No acute brain abnormality is seen. Specifically, no acute intracranial hemorrhage, no acute infarct, no significant intracranial mass lesion, and no acute intracranial mass effect are appreciated.  Please see above comments for further detail.    Please  note that portions of this note were completed with a voice recognition program.  MANDA YEPEZ JR, MD       Electronically Signed and Approved By: MANDA YEPEZ JR, MD on 2/26/2024 at 2:31                 Differential Diagnosis and Discussion:    Stroke: Differential diagnosis in this patient with signs of possible ischemic stroke include TIA or ischemic stroke, hemorrhagic stroke, hypoglycemic episode, toxic or metabolic encephalopathy, paresthesias.    All labs were reviewed and interpreted by me.  All X-rays impressions were independently interpreted by me.  EKG was interpreted by me.  CT scan radiology impression was interpreted by me.    MDM  Number of Diagnoses or Management Options  Diagnosis management comments: Patient is afebrile nontoxic-appearing.  Vital signs remarkable for hypertension.  No focal deficits on exam.  CT of his head showed no acute finding.  Patient was evaluated with teleneurology who recommended admission for MRI.  Patient was discussed with hospitalist and will be admitted for further care       Amount and/or Complexity of Data Reviewed  Clinical lab tests: reviewed  Tests in the radiology section of CPT®: reviewed  Review and summarize past medical records: yes  Independent visualization of images, tracings, or specimens: yes    Risk of Complications, Morbidity, and/or Mortality  Presenting problems: moderate  Management options: moderate                 Patient Care Considerations:    SEPSIS was considered but is NOT present in the emergency department as SIRS criteria is not present.      Consultants/Shared Management Plan:    Hospitalist: I have discussed the case with Dr Kemp who agrees to accept the patient for admission.    Social Determinants of Health:    Patient is independent, reliable, and has access to care.       Disposition and Care Coordination:    Admit:   Through independent evaluation of the patient's history, physical, and imperical data, the patient meets  criteria for inpatient admission to the hospital.        Final diagnoses:   TIA (transient ischemic attack)   Hypertension, unspecified type        ED Disposition       ED Disposition   Decision to Admit    Condition   --    Comment   Level of Care: Telemetry [5]   Diagnosis: TIA (transient ischemic attack) [460305]   Bed Request Comments: stroke eval   Certification: I Certify That Inpatient Hospital Services Are Medically Necessary For Greater Than 2 Midnights                 This medical record created using voice recognition software.             Gregoria Olivera MD  02/26/24 0541      Electronically signed by Gregoria Olivera MD at 02/26/24 0541          Physician Progress Notes (last 24 hours)        Brent Keller DO at 02/27/24 0835          [facility]    Routine Consult Follow-Up    Patient Name:   Ahsan Moran  YOB: 1980  Identification Number:   MRN - [id number]  Date of Service:   02/27/2024 08:00:07    Diagnosis        G45.9 - Transient cerebral ischemic attack, unspecified    Impression  Left hemiparesis, paresthesia and expressive aphasia (resolved)  Possibly TIA versus partial seizure  Given his history of atrial fibrillation, I would recommend taking oral anticoagulant long term, Eliquis is acceptable  Given temporal gliosis on MRI, I would question the possibility for partial seizure. He would rather do EEG as an outpatient. I recommend that he have close follow up with a neurologist to ensure continuity of care and proper evaluation.  Follow up with PCP post discharge for continuity of care  No additional recommendations at this time.    Our recommendations are outlined below    Laboratory Studies :  Lipid panel  I ordered  Hemoglobin A1c    Antithrombotic Medication :  Aspirin 81 mg PO daily  Already Ordered  Statins for LDL goal less than 70  Already Ordered    Anticoagulant Medication :  Eliquis 5mg  bid  Already Ordered    Nursing Recommendations :  IV Fluids, avoid dextrose containing fluids, Maintain euglycemiaNeuro checks q4 hrs x 24 hrs and then per shiftHead of bed 30 degreesContinue with Telemetry    Consultations :  Recommend Speech therapy if failed dysphagia screenPhysical therapy/Occupational therapySmoking cessation counselling recommendedInpatient rehab if recommended by physical/occupational therapy    DVT Prophylaxis :  Choice of Primary Team    Disposition :  No further recommendations  Will signoff please contact for any questions  Outpatient Neurology follow up in 1-3 weeks    Subjective  44-year-old male with a medical history of hypertension (HTN), atrial fibrillation (A-fib) without anticoagulation, and alcohol abuse was brought in by EMS with symptoms suggestive of a stroke, including dizziness, nausea, ringing in the ears, left-sided face and arm numbness, tingling, weakness, and difficulty with word finding. His last known normal state was at 00:30 hours. Interestingly, the patient reports that his symptoms have now resolved, although he still experiences mild nausea and dizziness upon changing positions.    It's important to note that the patient typically consumes 5-8 cans of beer daily, with his last drink approximately 24 hours prior to presentation.    Hospital Course  Doing better today, clarifies that events from yesterday were transient in nature.    Imaging  HEAD CT: No significant findings  CTA HEAD: No LVO  MRI Brain: Left anterior temporal FLAIR changes  ECHO: No cardioembolic sources of TIA/stroke seen    Labs  TSH elevated but normal free T4  AST elevated  B12 337  A1c = 5.4       Examination  BP(145/92), Pulse(86), Temp(98.1), Resp(16),    Neuro Exam:  General: Alert,Awake, Oriented to Time, Place, Person    Speech: Fluent:    Language: Intact:    Face: Symmetric:    Facial Sensation: Intact:    Extraocular Movements: Intact:    Motor Exam: No Drift:    Sensation:  Intact:    Coordination: Intact:           Patient/Family was informed the Neurology Consult would happen via TeleHealth consult by way of interactive audio and video telecommunications and consented to receiving care in this manner.    Telehealth Neurology consultation was provided. I spent 27 minutes providing telehealth care. This includes time spent for face to face visit via telemedicine, review of medical records, imaging studies and discussion of findings with providers, the patient and/or family.      Dr Brent Keller      [facility]  For Inpatient follow-up with [facility] physician please call [facility] [phone]. This is not an outpatient service. Post hospital discharge, please contact hospital directly.    Please do not communicate with [facility] physicians via secure chat. If you have any questions, Please contact [facility].  Please call or reconsult our service if there are any clinical or diagnostic changes.    Electronically signed by Brent Keller DO at 02/27/24 0835       Consult Notes (last 24 hours)  Notes from 02/26/24 1159 through 02/27/24 1159   No notes of this type exist for this encounter.     [facility] ,[facility] [phone]-  F [phone]

## 2024-02-28 NOTE — OUTREACH NOTE
Prep Survey      Flowsheet Row Responses   [facility] patient discharged from? [place]   Is LACE score < 7 ? Yes   Eligibility [facility] [place]   Date of Admission 02/26/24   Date of Discharge 02/27/24   Discharge Disposition Home or Self Care   Discharge diagnosis TIA (transient ischemic attack)   Does the patient have one of the following disease processes/diagnoses(primary or secondary)? Stroke   Does the patient have Home health ordered? No   Is there a DME ordered? No   Prep survey completed? Yes            Meredith PRITCHETT - Registered Nurse

## 2024-02-29 ENCOUNTER — READMISSION MANAGEMENT (OUTPATIENT)
Dept: CALL CENTER | Facility: HOSPITAL | Age: 44
End: 2024-02-29
Payer: OTHER GOVERNMENT

## 2024-02-29 NOTE — OUTREACH NOTE
Stroke Week 1 Survey      Flowsheet Row Responses   [facility] patient discharged from? [place]   Does the patient have one of the following disease processes/diagnoses(primary or secondary)? Stroke   Week 1 attempt successful? No   Unsuccessful attempts Attempt 1            VETO CAICEDO - Registered Nurse

## 2024-03-04 ENCOUNTER — READMISSION MANAGEMENT (OUTPATIENT)
Dept: CALL CENTER | Facility: HOSPITAL | Age: 44
End: 2024-03-04
Payer: OTHER GOVERNMENT

## 2024-03-04 NOTE — OUTREACH NOTE
Stroke Week 1 Survey      Flowsheet Row Responses   [facility] facility patient discharged from? [place]   Does the patient have one of the following disease processes/diagnoses(primary or secondary)? Stroke   Week 1 attempt successful? No   Unsuccessful attempts Attempt 2            Kassidy PRITCHETT - Registered Nurse

## 2024-03-06 LAB
GLUCOSE BLDC GLUCOMTR-MCNC: 145 MG/DL (ref 70–99)
QT INTERVAL: 371 MS
QTC INTERVAL: 436 MS

## 2024-03-08 ENCOUNTER — HOSPITAL ENCOUNTER (EMERGENCY)
Facility: HOSPITAL | Age: 44
Discharge: HOME | End: 2024-03-08
Attending: EMERGENCY MEDICINE
Payer: OTHER GOVERNMENT

## 2024-03-08 ENCOUNTER — READMISSION MANAGEMENT (OUTPATIENT)
Dept: CALL CENTER | Facility: HOSPITAL | Age: 44
End: 2024-03-08
Payer: OTHER GOVERNMENT

## 2024-03-08 ENCOUNTER — APPOINTMENT (OUTPATIENT)
Dept: GENERAL RADIOLOGY | Facility: HOSPITAL | Age: 44
End: 2024-03-08
Payer: OTHER GOVERNMENT

## 2024-03-08 ENCOUNTER — APPOINTMENT (OUTPATIENT)
Dept: CT IMAGING | Facility: HOSPITAL | Age: 44
End: 2024-03-08
Payer: OTHER GOVERNMENT

## 2024-03-08 VITALS
RESPIRATION RATE: 18 BRPM | BODY MASS INDEX: 36.44 KG/M2 | WEIGHT: 269 LBS | HEART RATE: 73 BPM | HEIGHT: 72 IN | TEMPERATURE: 98.7 F | OXYGEN SATURATION: 97 % | SYSTOLIC BLOOD PRESSURE: 136 MMHG | DIASTOLIC BLOOD PRESSURE: 83 MMHG

## 2024-03-08 DIAGNOSIS — R53.1 WEAKNESS: Primary | ICD-10-CM

## 2024-03-08 LAB
ALBUMIN SERPL-MCNC: 4.2 G/DL (ref 3.5–5.2)
ALBUMIN/GLOB SERPL: 1.3 G/DL
ALP SERPL-CCNC: 74 U/L (ref 39–117)
ALT SERPL W P-5'-P-CCNC: 28 U/L (ref 1–41)
ANION GAP SERPL CALCULATED.3IONS-SCNC: 13.4 MMOL/L (ref 5–15)
AST SERPL-CCNC: 37 U/L (ref 1–40)
BASOPHILS # BLD AUTO: 0.05 10*3/MM3 (ref 0–0.2)
BASOPHILS NFR BLD AUTO: 0.7 % (ref 0–1.5)
BILIRUB SERPL-MCNC: 0.6 MG/DL (ref 0–1.2)
BUN SERPL-MCNC: 12 MG/DL (ref 6–20)
BUN/CREAT SERPL: 12.5 (ref 7–25)
CALCIUM SPEC-SCNC: 9.3 MG/DL (ref 8.6–10.5)
CHLORIDE SERPL-SCNC: 101 MMOL/L (ref 98–107)
CO2 SERPL-SCNC: 26.6 MMOL/L (ref 22–29)
CREAT SERPL-MCNC: 0.96 MG/DL (ref 0.76–1.27)
DEPRECATED RDW RBC AUTO: 43.8 FL (ref 37–54)
EGFRCR SERPLBLD CKD-EPI 2021: 99.3 ML/MIN/1.73
EOSINOPHIL # BLD AUTO: 0.06 10*3/MM3 (ref 0–0.4)
EOSINOPHIL NFR BLD AUTO: 0.8 % (ref 0.3–6.2)
ERYTHROCYTE [DISTWIDTH] IN BLOOD BY AUTOMATED COUNT: 12.8 % (ref 12.3–15.4)
GLOBULIN UR ELPH-MCNC: 3.3 GM/DL
GLUCOSE BLDC GLUCOMTR-MCNC: 82 MG/DL (ref 70–99)
GLUCOSE SERPL-MCNC: 92 MG/DL (ref 65–99)
HCT VFR BLD AUTO: 36.9 % (ref 37.5–51)
HGB BLD-MCNC: 12.7 G/DL (ref 13–17.7)
HOLD SPECIMEN: NORMAL
HOLD SPECIMEN: NORMAL
IMM GRANULOCYTES # BLD AUTO: 0.02 10*3/MM3 (ref 0–0.05)
IMM GRANULOCYTES NFR BLD AUTO: 0.3 % (ref 0–0.5)
INR PPP: 1.09 (ref 0.86–1.15)
LYMPHOCYTES # BLD AUTO: 1.36 10*3/MM3 (ref 0.7–3.1)
LYMPHOCYTES NFR BLD AUTO: 17.8 % (ref 19.6–45.3)
MCH RBC QN AUTO: 32.4 PG (ref 26.6–33)
MCHC RBC AUTO-ENTMCNC: 34.4 G/DL (ref 31.5–35.7)
MCV RBC AUTO: 94.1 FL (ref 79–97)
MONOCYTES # BLD AUTO: 0.94 10*3/MM3 (ref 0.1–0.9)
MONOCYTES NFR BLD AUTO: 12.3 % (ref 5–12)
NEUTROPHILS NFR BLD AUTO: 5.23 10*3/MM3 (ref 1.7–7)
NEUTROPHILS NFR BLD AUTO: 68.1 % (ref 42.7–76)
NRBC BLD AUTO-RTO: 0 /100 WBC (ref 0–0.2)
PLATELET # BLD AUTO: 266 10*3/MM3 (ref 140–450)
PMV BLD AUTO: 10.2 FL (ref 6–12)
POTASSIUM SERPL-SCNC: 4.5 MMOL/L (ref 3.5–5.2)
PROT SERPL-MCNC: 7.5 G/DL (ref 6–8.5)
PROTHROMBIN TIME: 14.3 SECONDS (ref 11.8–14.9)
QT INTERVAL: 375 MS
QTC INTERVAL: 430 MS
RBC # BLD AUTO: 3.92 10*6/MM3 (ref 4.14–5.8)
SODIUM SERPL-SCNC: 141 MMOL/L (ref 136–145)
WBC NRBC COR # BLD AUTO: 7.66 10*3/MM3 (ref 3.4–10.8)
WHOLE BLOOD HOLD COAG: NORMAL
WHOLE BLOOD HOLD SPECIMEN: NORMAL

## 2024-03-08 PROCEDURE — 71045 X-RAY EXAM CHEST 1 VIEW: CPT

## 2024-03-08 PROCEDURE — 80053 COMPREHEN METABOLIC PANEL: CPT | Performed by: EMERGENCY MEDICINE

## 2024-03-08 PROCEDURE — 93005 ELECTROCARDIOGRAM TRACING: CPT | Performed by: EMERGENCY MEDICINE

## 2024-03-08 PROCEDURE — 85610 PROTHROMBIN TIME: CPT | Performed by: EMERGENCY MEDICINE

## 2024-03-08 PROCEDURE — 82948 REAGENT STRIP/BLOOD GLUCOSE: CPT | Performed by: EMERGENCY MEDICINE

## 2024-03-08 PROCEDURE — 70450 CT HEAD/BRAIN W/O DYE: CPT

## 2024-03-08 PROCEDURE — 85025 COMPLETE CBC W/AUTO DIFF WBC: CPT | Performed by: EMERGENCY MEDICINE

## 2024-03-08 PROCEDURE — 99284 EMERGENCY DEPT VISIT MOD MDM: CPT

## 2024-03-08 PROCEDURE — 99285 EMERGENCY DEPT VISIT HI MDM: CPT

## 2024-03-08 RX ORDER — SODIUM CHLORIDE 0.9 % (FLUSH) 0.9 %
10 SYRINGE (ML) INJECTION AS NEEDED
Status: DISCONTINUED | OUTPATIENT
Start: 2024-03-08 | End: 2024-03-08 | Stop reason: HOSPADM

## 2024-03-08 NOTE — OUTREACH NOTE
Stroke Week 1 Survey      Flowsheet Row Responses   [facility] facility patient discharged from? [place]   Does the patient have one of the following disease processes/diagnoses(primary or secondary)? Stroke   Week 1 attempt successful? No   Unsuccessful attempts Attempt 3            VALERIE HERNANDEZ - Registered Nurse

## 2024-03-08 NOTE — CONSULTS
[facility]  [facility] Tele[facility]urology Consult Services      Patient Name:   Ahsan Moran  YOB: 1980  Identification Number:   MRN - [id number]  Date of Service:   03/08/2024 17:00:42    Diagnosis:        G45.9 - Transient cerebral ischemic attack, unspecified    Impression:       43 yo man with HTN, hx of TIA and pAfib (Apixaban) who presented with right leg weakness and difficulty speaking that lasted for 30 minutes. He had the same presentation Last week and started on Apixaban due to hx of pAfib. MRI brain and CTA head/neck were unremarkable. EEG was planned as outpatient. His symptoms this time are milder and resolved faster than last time. NIHSS is zero and head CT is negative for acute findings.   Recommendations:   - No indication to repeat MRI brain or CTA head/neck since they were unremarkable last week and current symptoms are resolved.   - Continue with planned EEG next week   - Continue Apixaban   - Neurology follow up as outpatient        ------------------------------------------------------------------------------    Advanced Imaging:  Advanced Imaging Deferred because:    Non-disabling symptoms as verified by the patient; no cortical signs so not consistent with LVO      Metrics:  Last Known Well: 03/08/2024 16:00:00  [facility] Notification Time: 03/08/2024 16:59:50  Arrival Time: 03/08/2024 16:55:00  Stamp Time: 03/08/2024 17:00:42  Initial Response Time: 03/08/2024 17:02:05  Symptoms: Right leg weakness.  Initial patient interaction: 03/08/2024 17:08:33  NIHSS Assessment Completed: 03/08/2024 17:07:58  Patient is not a candidate for Thrombolytic.  Thrombolytic Medical Decision: 03/08/2024 17:08:01  Patient was not deemed candidate for Thrombolytic because of following reasons:  Resolved symptoms (no residual disabling symptoms).    I personally Reviewed the CT Head and it Showed waldemar cute findings    Primary Provider Notified of Diagnostic Impression and  Management Plan on: 03/08/2024 17:12:15        ------------------------------------------------------------------------------    History of Present Illness:  Patient is a 44 year old Male.    Patient was brought by EMS for symptoms of Right leg weakness.  45 yo man with HTN, hx of TIA and pAfib (Apixaban) who presented with right leg weakness and difficulty speaking that lasted for 30 minutes. He had the same presentation Last week and started on Apixaban due to hx of pAfib. MRI brain and CTA head/neck were unremarkable. EEG was planned as outpatient.      Past Medical History:       Hypertension       Atrial Fibrillation    Medications:    Anticoagulant use:  Yes Apixaban  No Antiplatelet use  Reviewed EMR for current medications    Allergies:   Reviewed    Social History:  Smoking: Former  Alcohol Use: No  Drug Use: No    Family History:    There is no family history of premature cerebrovascular disease pertinent to this consultation    ROS :  14 Points Review of Systems was performed and was negative except mentioned in HPI.    Past Surgical History:  There Is No Surgical History Contributory To Today’s Visit        Examination:  BP(108/82), Pulse(87), Blood Glucose(182)  1A: Level of Consciousness - Alert; keenly responsive + 0  1B: Ask Month and Age - Both Questions Right + 0  1C: Blink Eyes & Squeeze Hands - Performs Both Tasks + 0  2: Test Horizontal Extraocular Movements - Normal + 0  3: Test Visual Fields - No Visual Loss + 0  4: Test Facial Palsy (Use Grimace if Obtunded) - Normal symmetry + 0  5A: Test Left Arm Motor Drift - No Drift for 10 Seconds + 0  5B: Test Right Arm Motor Drift - No Drift for 10 Seconds + 0  6A: Test Left Leg Motor Drift - No Drift for 5 Seconds + 0  6B: Test Right Leg Motor Drift - No Drift for 5 Seconds + 0  7: Test Limb Ataxia (FNF/Heel-Shin) - No Ataxia + 0  8: Test Sensation - Normal; No sensory loss + 0  9: Test Language/Aphasia - Normal; No aphasia + 0  10: Test Dysarthria -  Normal + 0  11: Test Extinction/Inattention - No abnormality + 0    NIHSS Score: 0    Pre-Morbid Modified Eliot Scale:  0 Points = No symptoms at all    Spoke with : ED physician  I reviewed the available imaging via Rapid and initiated discussion with the primary provider    Patient/Family was informed the Neurology Consult would occur via TeleHealth consult by way of interactive audio and video telecommunications and consented to receiving care in this manner.      Patient is being evaluated for possible acute neurologic impairment and high probability of imminent or life-threatening deterioration. I spent total of 35 minutes providing care to this patient, including time for face to face visit via telemedicine, review of medical records, imaging studies and discussion of findings with providers, the patient and/or family.      Dr Parveen Addison      [facility]  For Inpatient follow-up with [facility] physician please call [facility] [phone]. This is not an outpatient service. Post hospital discharge, please contact hospital directly.    Please do not communicate with [facility] physicians via secure chat. If you have any questions, Please contact [facility].  Please call or reconsult our service if there are any clinical or diagnostic changes.

## 2024-03-09 NOTE — ED PROVIDER NOTES
Time: 7:11 PM EST  Date of encounter:  3/8/2024  Independent Historian/Clinical History and Information was obtained by:   Patient    History is limited by: N/A    Chief Complaint: Weakness      History of Present Illness:  Patient is a 44 y.o. year old male who presents to the emergency department for evaluation of weakness and difficulty speaking that occurred earlier today.  Patient denies chest pain or shortness of breath.  Patient has no cough hemoptysis.  Patient denies dysuria and urinary frequency.  He states that his symptoms subsided prior to ED arrival.  Patient was recently here and had symptoms very similar and was admitted for stroke workup.    HPI    Patient Care Team  Primary Care Provider: Provider, No Known    Past Medical History:     No Known Allergies  Past Medical History:   Diagnosis Date    A-fib     Hypertension      History reviewed. No pertinent surgical history.  History reviewed. No pertinent family history.    Home Medications:  Prior to Admission medications    Medication Sig Start Date End Date Taking? Authorizing Provider   apixaban (ELIQUIS) 5 MG tablet tablet Take 1 tablet by mouth Every 12 (Twelve) Hours for 30 days. 2/27/24 3/28/24  León Jimenez MD   aspirin 81 MG chewable tablet Chew 1 tablet Daily for 30 days. 2/28/24 3/29/24  León Jimenez MD   atorvastatin (LIPITOR) 80 MG tablet Take 1 tablet by mouth Every Night for 30 days. 2/27/24 3/28/24  León Jimenez MD   Cholecalciferol 25 MCG (1000 UT) tablet Take 2 tablets by mouth Daily.    Wilfred Benoit MD   folic acid (FOLVITE) 1 MG tablet Take 1 tablet by mouth Daily for 30 days. 2/28/24 3/29/24  León Jimenez MD   lisinopril (PRINIVIL,ZESTRIL) 20 MG tablet Take 1 tablet by mouth Daily.    Wilfred Benoit MD   Metoprolol Succinate 50 MG capsule extended-release 24 hour sprinkle Take 1 tablet by mouth Daily.    Wilfred Benoit MD   Multi Vitamin tablet tablet Take 1 tablet by mouth Daily.    Cy  "MD Wilfred   omeprazole (priLOSEC) 20 MG capsule Take 1 capsule by mouth Daily.    Provider, MD Wilfred   thiamine (VITAMIN B1) 100 MG tablet Take 1 tablet by mouth Daily for 30 days. 3/2/24 4/1/24  León Jimenez MD        Social History:   Social History     Tobacco Use    Smoking status: Never    Smokeless tobacco: Current     Types: Snuff   Vaping Use    Vaping status: Never Used   Substance Use Topics    Alcohol use: Yes     Alcohol/week: 40.0 standard drinks of alcohol     Types: 40 Cans of beer per week    Drug use: Not Currently         Review of Systems:  Review of Systems   Constitutional:  Negative for chills and fever.   HENT:  Negative for congestion, rhinorrhea and sore throat.    Eyes:  Negative for pain and visual disturbance.   Respiratory:  Negative for apnea, cough, chest tightness and shortness of breath.    Cardiovascular:  Negative for chest pain and palpitations.   Gastrointestinal:  Negative for abdominal pain, diarrhea, nausea and vomiting.   Genitourinary:  Negative for difficulty urinating and dysuria.   Musculoskeletal:  Negative for joint swelling and myalgias.   Skin:  Negative for color change.   Neurological:  Positive for weakness. Negative for seizures and headaches.   Psychiatric/Behavioral: Negative.     All other systems reviewed and are negative.       Physical Exam:  /65   Pulse 74   Temp 98.7 °F (37.1 °C) (Oral)   Resp 12   Ht 182.9 cm (72\")   Wt 122 kg (269 lb)   SpO2 100%   BMI 36.48 kg/m²     Physical Exam  Vitals and nursing note reviewed.   Constitutional:       General: He is not in acute distress.     Appearance: Normal appearance. He is not toxic-appearing.   HENT:      Head: Normocephalic and atraumatic.      Jaw: There is normal jaw occlusion.   Eyes:      General: Lids are normal.      Extraocular Movements: Extraocular movements intact.      Conjunctiva/sclera: Conjunctivae normal.      Pupils: Pupils are equal, round, and reactive to light. "   Cardiovascular:      Rate and Rhythm: Normal rate and regular rhythm.      Pulses: Normal pulses.      Heart sounds: Normal heart sounds.   Pulmonary:      Effort: Pulmonary effort is normal. No respiratory distress.      Breath sounds: Normal breath sounds. No wheezing or rhonchi.   Abdominal:      General: Abdomen is flat.      Palpations: Abdomen is soft.      Tenderness: There is no abdominal tenderness. There is no guarding or rebound.   Musculoskeletal:         General: Normal range of motion.      Cervical back: Normal range of motion and neck supple.      Right lower leg: No edema.      Left lower leg: No edema.   Skin:     General: Skin is warm and dry.   Neurological:      Mental Status: He is alert and oriented to person, place, and time. Mental status is at baseline.   Psychiatric:         Mood and Affect: Mood normal.                  Procedures:  Procedures      Medical Decision Making:      Comorbidities that affect care:    Recent TIA    External Notes reviewed:    Hospital Discharge Summary: Patient was recently admitted and had a full stroke workup.      The following orders were placed and all results were independently analyzed by me:  Orders Placed This Encounter   Procedures    CT Head Without Contrast Stroke Protocol    XR Chest 1 View    Comprehensive Metabolic Panel    Protime-INR    [place] Draw    CBC Auto Differential    NPO Diet NPO Type: Strict NPO    Undress and Gown    Continuous Pulse Oximetry    Vital Signs    Nursing Dysphagia Screening (Complete Prior to Giving anything PO)    RN to Place Order SLP Consult (IF swallow screen failed) - Eval & Treat Choosing Reason of RN Dysphagia Screen Failed    Oxygen Therapy- Nasal Cannula; Titrate 1-6 LPM Per SpO2; 90 - 95%    POC Glucose Once    ECG 12 Lead ED Triage Standing Order; Stroke (Onset >12 hrs)    Insert Peripheral IV    CBC & Differential    Green Top (Gel)    Lavender Top    Gold Top - SST    Light Blue Top       Medications  Given in the Emergency Department:  Medications   sodium chloride 0.9 % flush 10 mL (has no administration in time range)        ED Course:         Labs:    Lab Results (last 24 hours)       Procedure Component Value Units Date/Time    POC Glucose Once [[id number]]  (Normal) Collected: 03/08/24 1703    Specimen: Blood Updated: 03/08/24 1704     Glucose 82 mg/dL      Comment: Serial Number: [id number]:  [id number]       CBC & Differential [[id number]]  (Abnormal) Collected: 03/08/24 1717    Specimen: Blood Updated: 03/08/24 1723    Narrative:      The following orders were created for panel order CBC & Differential.  Procedure                               Abnormality         Status                     ---------                               -----------         ------                     CBC Auto Differential[[id number]]        Abnormal            Final result                 Please view results for these tests on the individual orders.    Comprehensive Metabolic Panel [[id number]] Collected: 03/08/24 1717    Specimen: Blood Updated: 03/08/24 1807     Glucose 92 mg/dL      BUN 12 mg/dL      Creatinine 0.96 mg/dL      Sodium 141 mmol/L      Potassium 4.5 mmol/L      Comment: Slight hemolysis detected by analyzer. Result may be falsely elevated.        Chloride 101 mmol/L      CO2 26.6 mmol/L      Calcium 9.3 mg/dL      Total Protein 7.5 g/dL      Albumin 4.2 g/dL      ALT (SGPT) 28 U/L      AST (SGOT) 37 U/L      Comment: Slight hemolysis detected by analyzer. Result may be falsely elevated.        Alkaline Phosphatase 74 U/L      Total Bilirubin 0.6 mg/dL      Globulin 3.3 gm/dL      A/G Ratio 1.3 g/dL      BUN/Creatinine Ratio 12.5     Anion Gap 13.4 mmol/L      eGFR 99.3 mL/min/1.73     Narrative:      GFR Normal >60  Chronic Kidney Disease <60  Kidney Failure <15      Protime-INR [[id number]]  (Normal) Collected: 03/08/24 1717    Specimen: Blood Updated: 03/08/24 1735     Protime 14.3 Seconds      INR 1.09     Narrative:      Suggested Therapeutic Ranges For Oral Anticoagulant Therapy:  Level of Therapy                      INR Target Range  Standard Dose                            2.0-3.0  High Dose                                2.5-3.5  Patients not receiving anticoagulant  Therapy Normal Range                     0.86-1.15    CBC Auto Differential [[id number]]  (Abnormal) Collected: 03/08/24 1717    Specimen: Blood Updated: 03/08/24 1723     WBC 7.66 10*3/mm3      RBC 3.92 10*6/mm3      Hemoglobin 12.7 g/dL      Hematocrit 36.9 %      MCV 94.1 fL      MCH 32.4 pg      MCHC 34.4 g/dL      RDW 12.8 %      RDW-SD 43.8 fl      MPV 10.2 fL      Platelets 266 10*3/mm3      Neutrophil % 68.1 %      Lymphocyte % 17.8 %      Monocyte % 12.3 %      Eosinophil % 0.8 %      Basophil % 0.7 %      Immature Grans % 0.3 %      Neutrophils, Absolute 5.23 10*3/mm3      Lymphocytes, Absolute 1.36 10*3/mm3      Monocytes, Absolute 0.94 10*3/mm3      Eosinophils, Absolute 0.06 10*3/mm3      Basophils, Absolute 0.05 10*3/mm3      Immature Grans, Absolute 0.02 10*3/mm3      nRBC 0.0 /100 WBC              Imaging:    XR Chest 1 View    Result Date: 3/8/2024  PROCEDURE: XR CHEST 1 VW  COMPARISON: [facility], CR, XR CHEST 1 VW, 2/26/2024, 3:09.  INDICATIONS: stroke like symptoms  FINDINGS:  No focal or diffuse infiltrate is identified. No pleural effusion or pneumothorax. Heart size and mediastinal contour appear within normal limits.         No radiographic findings of acute cardiopulmonary abnormality.       CHEL ROBLEDO MD       Electronically Signed and Approved By: CHEL ROBLEDO MD on 3/08/2024 at 18:59             CT Head Without Contrast Stroke Protocol    Result Date: 3/8/2024  PROCEDURE: CT HEAD WO CONTRAST STROKE PROTOCOL  COMPARISON:  [facility], CT, CT CEREBRAL PERFUSION W WO CONTRAST, 2/26/2024, 2:28.  [facility], CT, CT ANGIOGRAM NECK, 2/26/2024, 2:36.  [facility], CT, CT  HEAD WO CONTRAST STROKE PROTOCOL, 2/26/2024, 2:11.  [facility], CT, CT ANGIOGRAM HEAD W AI ANALYSIS OF LVO, 2/26/2024, 2:36.  [facility], MR, MRI BRAIN WO CONTRAST, 2/26/2024, 11:09. INDICATIONS: Stroke, follow up/SLURRED SPEECH  PROTOCOL:   Standard imaging protocol performed    RADIATION:   DLP: 953.9 mGy*cm   MA and/or KV was adjusted to minimize radiation dose.     TECHNIQUE: After obtaining the patient's consent, CT images were obtained without non-ionic intravenous contrast material.  FINDINGS:  No midline shift.  There is prominence of the ventricles and sulci and the cisterna magna, as before.  Basal cisterns appear patent.  Subtle hypodensity and suspected volume loss is seen in the anterior left temporal lobe in the area of abnormal FLAIR signal on the prior MRI.  This again could be an area of encephalomalacia.  No definite edema or mass lesion is identified.  No definite findings of an acute infarction.  No definite acute calvarial abnormality.  Paranasal sinuses and mastoid air cells appear clear.        1. No definite CT evidence of acute infarction or hemorrhage.  MRI would be more sensitive for acute ischemia or other acute pathology. 2. Subtle hypodensity and volume loss in the anterior left temporal lobe corresponding to the abnormality seen on recent prior MRI.  This is again nonspecific by imaging but may be related to encephalomalacia given the relative stability.  This could also be followed up with MRI.    Findings were reported to Dr. Sharif in the ED at 1716 hours 3/8/2024.     CHEL ROBLEDO MD       Electronically Signed and Approved By: CHEL ROBLEDO MD on 3/08/2024 at 17:21                Differential Diagnosis and Discussion:    Weakness: Based on the patient's history, signs, and symptoms, the diffential diagnosis includes but is not limited to meningitis, stroke, sepsis, subarachnoid hemorrhage, intracranial bleeding, encephalitis, acute uti, dehydration,  MS, myasthenia gravis, Guillan [place], migraine variant, neuromuscular disorders vertigo, electrolyte imbalance, and metabolic disorders.    All labs were reviewed and interpreted by me.  CT scan radiology impression was interpreted by me.    MDM     Amount and/or Complexity of Data Reviewed  Clinical lab tests: reviewed  Tests in the radiology section of CPT®: reviewed  Tests in the medicine section of CPT®: reviewed       The patient is resting comfortably and feels better, is alert, talkative and in no distress.  The patient´s CBC that was reviewed and interpreted by me shows no abnormalities of critical concern. Of note, there is no anemia requiring a blood transfusion and the platelet count is acceptable.  The patient´s CMP that was reviewed and interpretted by me shows no abnormalities of critical concern. Of note, the patient´s sodium and potassium are acceptable. The patient´s liver enzymes are unremarkable. The patient´s renal function (creatinine) is preserved. The patient has a normal anion gap.  CT head is negative for acute intracranial abnormalities.  The repeat examination is unremarkable and benign. The patient is neurologically intact, has a normal mental status and is ambulatory in the ED.  The history, exam, diagnostic testing in the patient's current condition do not suggest meningitis, stroke, sepsis, subarachnoid hemorrhage, intracranial bleeding, encephalitis or other significant pathology that would warrant further testing, continued ED treatment, admission, neurological consultation, or other specialist evaluation at this point. The vital signs have been stable. The patient's condition is stable and appropriate for discharge. The patient will pursue further outpatient evaluation with the primary care physician or other designated or consulting position as indicated in the discharge instructions.          Patient Care Considerations:    MRI: I considered ordering an MRI however patient had a  recent MRI as an inpatient.      Consultants/Shared Management Plan:    Case was discussed with neurology with teleneuro specialist who states that the patient can be discharged as he is back to baseline and had a recent stroke workup.    Social Determinants of Health:    Patient is independent, reliable, and has access to care.       Disposition and Care Coordination:    Discharged: I considered escalation of care by admitting this patient to the hospital, however after discussion with neurology        Final diagnoses:   Weakness        ED Disposition       ED Disposition   Discharge    Condition   Stable    Comment   --               This medical record created using voice recognition software.             Carlos Eduardo Sharif MD  03/08/24 1920       Carlos Eduardo Sharif MD  03/08/24 1921

## 2024-03-20 LAB
QT INTERVAL: 375 MS
QTC INTERVAL: 430 MS

## 2024-04-01 ENCOUNTER — OFFICE VISIT (OUTPATIENT)
Dept: NEUROLOGY | Facility: CLINIC | Age: 44
End: 2024-04-01
Payer: OTHER GOVERNMENT

## 2024-04-01 VITALS
HEIGHT: 72 IN | HEART RATE: 78 BPM | WEIGHT: 262 LBS | BODY MASS INDEX: 35.49 KG/M2 | DIASTOLIC BLOOD PRESSURE: 104 MMHG | SYSTOLIC BLOOD PRESSURE: 158 MMHG

## 2024-04-01 DIAGNOSIS — I48.0 PAROXYSMAL ATRIAL FIBRILLATION: ICD-10-CM

## 2024-04-01 DIAGNOSIS — G45.9 TIA (TRANSIENT ISCHEMIC ATTACK): Primary | ICD-10-CM

## 2024-04-01 DIAGNOSIS — F41.9 ANXIETY: ICD-10-CM

## 2024-04-01 DIAGNOSIS — R90.89 MRI OF BRAIN ABNORMAL: ICD-10-CM

## 2024-04-01 RX ORDER — DIAZEPAM 10 MG/1
TABLET ORAL
Qty: 2 TABLET | Refills: 0 | Status: SHIPPED | OUTPATIENT
Start: 2024-04-01

## 2024-04-01 NOTE — PROGRESS NOTES
Chief Complaint  Neurologic Problem ([facility] ED [place]. 02/2024)    Ree Moran is a 44 y.o. male who presents to [facility]ERY  History of Present Illness  44-year-old man evaluated for an event that occurred in February.  He states that he was just sitting in his couch watching a game show and all of a sudden his tinnitus got louder.  It lasted for few seconds and then he noticed that his vision was affected and he had tunnel vision.  There was no dizziness and there was no headache.  About 5 minutes later he noted that he had difficulty in moving his body as he tried to get up and he could not get up and he can only move his arms and the robot type of motion up-and-down and he was able to reach his phone and called 911.  His speech was cut up and he had difficult time getting his words out and he could only speak 1 syllables at a time.  When the ambulance came he had difficulty in expressing himself but he was able to get up to the gurney and then while he was in the ambulance his words started coming back but he still had a hard time expressing himself until he was in the emergency room.  He did not have a headache.  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  The differential diagnosis herpes encephalitis.    He was diagnosed to have paroxysmal atrial fibrillation in 2016 or 17 while he was in the  and his wife noted his heart to be irregular.  He was never diagnosed electrocardiographically as having atrial fibrillation and he had a workup but not a long-term Holter monitor or a loop monitor.  He was retired from the [employer] at that time in 2018 and was followed by primary care and he was taking Eliquis irregularly.    He had another event that occurred about a week after he was discharged from the hospital in which his right leg got numb and he got scared that he got tightness around the back  "of his head and he felt like he might have a stroke and therefore he was taken to an urgent care center and then he was sent to [facility] for further workup.    Objective   Vital Signs:   BP (!) 158/104   Pulse 78   Ht 182.9 cm (72.01\")   Wt 119 kg (262 lb)   BMI 35.53 kg/m²     Physical Exam   He is alert, fluent, phasic, follows commands well.  Visual fields full, EMs full directions gaze, facial strength is full.  There is no drift.  There is no weakness of the upper or lower extremities and vision muscle testing.  Reflexes are symmetrically decreased.  Cerebellar testing is intact.  Station and gait is unremarkable.  Heart is regular rhythm normal in rate.        Assessment and Plan  Diagnoses and all orders for this visit:    1. TIA (transient ischemic attack) (Primary)  Assessment & Plan:  The event that he had could be a seizure versus a TIA and less likely to be anxiety related.  At this time I will not start him on any other medications.  Should have continued recurrent spells I may refer him to the [facility] epilepsy clinic in the future for epilepsy monitoring.  He is to seek medical attention should he have any recurrent spells.  I will see him again in 2 months time for follow-up.    Orders:  -     MRI Brain With & Without Contrast; Future  -     Ambulatory Referral to Cardiology    2. Paroxysmal atrial fibrillation  Assessment & Plan:  He is to continue taking Eliquis 5 mg twice a day and etiology will decide if he needs to continue taking this indefinitely.  I would recommended for him to have a loop monitor.  He is to follow-up with cardiology.    Orders:  -     MRI Brain With & Without Contrast; Future  -     Ambulatory Referral to Cardiology    3. MRI of brain abnormal  Assessment & Plan:  Discussed with him that we will repeat an MRI of the brain with and without contrast.  He has had a previous stroke based on MRI.  It is unlikely to be anything else.    Orders:  -     " MRI Brain With & Without Contrast; Future  -     Ambulatory Referral to Cardiology    4. Anxiety  -     diazePAM (VALIUM) 10 MG tablet; Take 1 tablet 1 hour prior to the MRI and repeat x 1 as needed 30 minutes prior to MRI.  Dispense: 2 tablet; Refill: 0    Other orders  -     apixaban (ELIQUIS) 5 MG tablet tablet; 1 tablet twice a day  Dispense: 60 tablet; Refill: 5         Total time spent with the patient and coordinating patient care was 60 minutes.    Follow Up  No follow-ups on file.  Patient was given instructions and counseling regarding his condition or for health maintenance advice. Please see specific information pulled into the AVS if appropriate.

## 2024-04-01 NOTE — ASSESSMENT & PLAN NOTE
Discussed with him that we will repeat an MRI of the brain with and without contrast.  He has had a previous stroke based on MRI.  It is unlikely to be anything else.

## 2024-04-01 NOTE — ASSESSMENT & PLAN NOTE
The event that he had could be a seizure versus a TIA and less likely to be anxiety related.  At this time I will not start him on any other medications.  Should have continued recurrent spells I may refer him to the [facility] epilepsy clinic in the future for epilepsy monitoring.  He is to seek medical attention should he have any recurrent spells.  I will see him again in 2 months time for follow-up.

## 2024-04-01 NOTE — ASSESSMENT & PLAN NOTE
He is to continue taking Eliquis 5 mg twice a day and etiology will decide if he needs to continue taking this indefinitely.  I would recommended for him to have a loop monitor.  He is to follow-up with cardiology.

## 2024-04-09 ENCOUNTER — OFFICE VISIT (OUTPATIENT)
Dept: CARDIOLOGY | Facility: CLINIC | Age: 44
End: 2024-04-09
Payer: OTHER GOVERNMENT

## 2024-04-09 VITALS
WEIGHT: 269.4 LBS | HEIGHT: 72 IN | SYSTOLIC BLOOD PRESSURE: 140 MMHG | DIASTOLIC BLOOD PRESSURE: 100 MMHG | HEART RATE: 85 BPM | BODY MASS INDEX: 36.49 KG/M2

## 2024-04-09 DIAGNOSIS — I10 PRIMARY HYPERTENSION: ICD-10-CM

## 2024-04-09 DIAGNOSIS — G45.9 TIA (TRANSIENT ISCHEMIC ATTACK): Primary | ICD-10-CM

## 2024-04-09 DIAGNOSIS — I48.0 PAROXYSMAL ATRIAL FIBRILLATION: ICD-10-CM

## 2024-04-09 PROCEDURE — 99204 OFFICE O/P NEW MOD 45 MIN: CPT | Performed by: INTERNAL MEDICINE

## 2024-04-09 NOTE — PROGRESS NOTES
"Chief Complaint  Atrial Fibrillation and Establish Care    Subjective        Ahsan Moran presents to [facility] CARDIOLOGY  History of present illness:    Patient is a 44-year-old male.  He retired from the  but when he was in the [employer] back in 2016 or 2017 he awoke 1 night at 2 AM.  He felt his heart beating irregularly.  He asked his wife to check his pulse and it apparently was irregular.  Apparently he called EMS and while in the ambulance they did an EKG and somebody mentioned \"it looks like A-fib.\"  By the time he got to the emergency department it had stopped and they were not able to apparently verify the atrial fibrillation.  He states at that time he was put on metoprolol and lisinopril but no blood thinners.  1 month ago he went into the hospital with symptoms worrisome for a TIA.  The neurologist at that time said it could either be a TIA versus seizure.  He saw a neurologist after getting out of the hospital who reviewed his initial MRI and the note states \"he has had prior CVA based on the MRI.\"  He has been set up for an MRA.  He was sent here by the neurologist for possible loop recorder.  He was put on Eliquis 1 month ago during that hospital admission.  He states he checks his blood pressure regularly at home and it runs under good control.  He also checks it occasionally at [facility] and it runs fine.  He does note that he has some social anxiety.  He does not smoke but does use smokeless tobacco.  He drinks 5 to 6 days a week either 2 or up to 10 beers at a time.  Mother and father have no heart disease.      Past Medical History:   Diagnosis Date    A-fib     Head injury 1998    Headache, tension-type     Hypertension     Sleep apnea     Stroke     TIA (transient ischemic attack)          History reviewed. No pertinent surgical history.       Social History     Socioeconomic History    Marital status:    Tobacco Use    Smoking status: Never     Passive " "exposure: Past    Smokeless tobacco: Current     Types: Snuff   Vaping Use    Vaping status: Never Used   Substance and Sexual Activity    Alcohol use: Yes     Alcohol/week: 40.0 standard drinks of alcohol     Types: 40 Cans of beer per week    Drug use: Not Currently    Sexual activity: Yes     Partners: Female     Birth control/protection: Condom, Same-sex partner         History reviewed. No pertinent family history.       No Known Allergies         Current Outpatient Medications:     lisinopril (PRINIVIL,ZESTRIL) 20 MG tablet, Take 1 tablet by mouth Daily., Disp: , Rfl:     Metoprolol Succinate 50 MG capsule extended-release 24 hour sprinkle, Take 1 tablet by mouth Daily., Disp: , Rfl:     Multi Vitamin tablet tablet, Take 1 tablet by mouth Daily., Disp: , Rfl:     omeprazole (priLOSEC) 20 MG capsule, Take 1 capsule by mouth Daily., Disp: , Rfl:     apixaban (ELIQUIS) 5 MG tablet tablet, 1 tablet twice a day (Patient not taking: Reported on 4/9/2024), Disp: 60 tablet, Rfl: 5    Cholecalciferol 25 MCG (1000 UT) tablet, Take 2 tablets by mouth Daily. (Patient not taking: Reported on 4/9/2024), Disp: , Rfl:     diazePAM (VALIUM) 10 MG tablet, Take 1 tablet 1 hour prior to the MRI and repeat x 1 as needed 30 minutes prior to MRI. (Patient not taking: Reported on 4/9/2024), Disp: 2 tablet, Rfl: 0      ROS:  Cardiac review of systems is negative.    Objective     /100   Pulse 85   Ht 182.9 cm (72\")   Wt 122 kg (269 lb 6.4 oz)   BMI 36.54 kg/m²       General Appearance:   well developed  well nourished  HENT:   oropharynx moist  lips not cyanotic  Respiratory:  no respiratory distress  normal breath sounds  no rales  Cardiovascular:  no jugular venous distention  regular rhythm  S1 normal, S2 normal  no S3, no S4   no murmur  no rub, no thrill  No carotid bruit  pedal pulses normal  lower extremity edema: none    Musculoskeletal:  no clubbing of fingers.   normocephalic, head atraumatic  Skin:   warm, " dry  Psychiatric:  judgement and insight appropriate  normal mood and affect    ECHO:  Results for orders placed during the hospital encounter of 02/26/24    Adult Transthoracic Echo Complete W/ Cont if Necessary Per Protocol (With Agitated Saline)    Interpretation Summary    Calculated left ventricular EF = 56.2%    No obvious wall motion abnormalities    No obvious valvular abnormalities    Negative bubble study for R-->L shunt    STRESS:    CATH:  No results found for this or any previous visit.    BMP:     Glucose   Date Value Ref Range Status   03/08/2024 92 65 - 99 mg/dL Final     BUN   Date Value Ref Range Status   03/08/2024 12 6 - 20 mg/dL Final     Creatinine   Date Value Ref Range Status   03/08/2024 0.96 0.76 - 1.27 mg/dL Final     Sodium   Date Value Ref Range Status   03/08/2024 141 136 - 145 mmol/L Final     Potassium   Date Value Ref Range Status   03/08/2024 4.5 3.5 - 5.2 mmol/L Final     Comment:     Slight hemolysis detected by analyzer. Result may be falsely elevated.     Chloride   Date Value Ref Range Status   03/08/2024 101 98 - 107 mmol/L Final     CO2   Date Value Ref Range Status   03/08/2024 26.6 22.0 - 29.0 mmol/L Final     Calcium   Date Value Ref Range Status   03/08/2024 9.3 8.6 - 10.5 mg/dL Final     BUN/Creatinine Ratio   Date Value Ref Range Status   03/08/2024 12.5 7.0 - 25.0 Final     Anion Gap   Date Value Ref Range Status   03/08/2024 13.4 5.0 - 15.0 mmol/L Final     eGFR   Date Value Ref Range Status   03/08/2024 99.3 >60.0 mL/min/1.73 Final     LIPIDS:  Total Cholesterol   Date Value Ref Range Status   02/26/2024 155 0 - 200 mg/dL Final     Triglycerides   Date Value Ref Range Status   02/26/2024 541 (H) 0 - 150 mg/dL Final     HDL Cholesterol   Date Value Ref Range Status   02/26/2024 34 (L) 40 - 60 mg/dL Final     LDL Cholesterol    Date Value Ref Range Status   02/26/2024 43 0 - 100 mg/dL Final     VLDL Cholesterol   Date Value Ref Range Status   02/26/2024 78 (H) 5 - 40  mg/dL Final     LDL/HDL Ratio   Date Value Ref Range Status   02/26/2024 0.38  Final         Procedures             ASSESSMENT:  Diagnoses and all orders for this visit:    1. TIA (transient ischemic attack) (Primary)  -     Cardiac Event Monitor; Future    2. Paroxysmal atrial fibrillation  -     Cardiac Event Monitor; Future    3. Primary hypertension         PLAN:    1.  It does not sound like the patient has a clear diagnosis of atrial fibrillation.  It would be very rare for him to be diagnosed with atrial fibrillation in his 30s.  2.  Patient had an echocardiogram and bubble study 2/26/2024 which showed normal ejection fraction, no significant valvular disease, and negative bubble study.  If this was truly a stroke we may consider a DILCIA with bubble study in the future.  3.  Reviewed patient's EKGs which showed normal sinus rhythm.  One of them had a PVC.  4.  Asked the patient to stay on the Eliquis for now.  5.  The neurologist is asking for a loop recorder.  We have decided to do a 30-day event recorder first.  If this shows no atrial fibrillation at that time we will give him the option of the loop recorder.  6.  I did ask the patient to see if the neurologist wants him on a statin and to see if he definitively feels like the MRI findings were a prior stroke.      Return in about 6 weeks (around 5/21/2024) for leandro barrientos.     Patient was given instructions and counseling regarding his condition or for health maintenance advice. Please see specific information pulled into the AVS if appropriate.         Akil White MD   4/9/2024  13:22 EDT

## 2024-04-20 ENCOUNTER — HOSPITAL ENCOUNTER (EMERGENCY)
Facility: HOSPITAL | Age: 44
Discharge: HOME | End: 2024-04-21
Attending: EMERGENCY MEDICINE
Payer: OTHER GOVERNMENT

## 2024-04-20 DIAGNOSIS — R51.9 HEADACHE, UNSPECIFIED HEADACHE TYPE: Primary | ICD-10-CM

## 2024-04-20 PROCEDURE — 99283 EMERGENCY DEPT VISIT LOW MDM: CPT

## 2024-04-20 RX ORDER — ONDANSETRON 2 MG/ML
4 INJECTION INTRAMUSCULAR; INTRAVENOUS ONCE
Status: COMPLETED | OUTPATIENT
Start: 2024-04-21 | End: 2024-04-21

## 2024-04-20 RX ORDER — KETOROLAC TROMETHAMINE 15 MG/ML
15 INJECTION, SOLUTION INTRAMUSCULAR; INTRAVENOUS ONCE
Status: COMPLETED | OUTPATIENT
Start: 2024-04-21 | End: 2024-04-21

## 2024-04-21 VITALS
RESPIRATION RATE: 18 BRPM | TEMPERATURE: 97.6 F | HEIGHT: 72 IN | WEIGHT: 260 LBS | SYSTOLIC BLOOD PRESSURE: 158 MMHG | HEART RATE: 74 BPM | OXYGEN SATURATION: 97 % | DIASTOLIC BLOOD PRESSURE: 99 MMHG | BODY MASS INDEX: 35.21 KG/M2

## 2024-04-21 PROCEDURE — 96374 THER/PROPH/DIAG INJ IV PUSH: CPT

## 2024-04-21 PROCEDURE — 25010000002 DIPHENHYDRAMINE PER 50 MG: Performed by: REGISTERED NURSE

## 2024-04-21 PROCEDURE — 25010000002 METOCLOPRAMIDE PER 10 MG: Performed by: REGISTERED NURSE

## 2024-04-21 PROCEDURE — 25010000002 ONDANSETRON PER 1 MG: Performed by: REGISTERED NURSE

## 2024-04-21 PROCEDURE — 25010000002 KETOROLAC TROMETHAMINE PER 15 MG: Performed by: REGISTERED NURSE

## 2024-04-21 PROCEDURE — 96375 TX/PRO/DX INJ NEW DRUG ADDON: CPT

## 2024-04-21 RX ORDER — DIPHENHYDRAMINE HYDROCHLORIDE 50 MG/ML
25 INJECTION INTRAMUSCULAR; INTRAVENOUS ONCE
Status: COMPLETED | OUTPATIENT
Start: 2024-04-21 | End: 2024-04-21

## 2024-04-21 RX ORDER — METOCLOPRAMIDE HYDROCHLORIDE 5 MG/ML
10 INJECTION INTRAMUSCULAR; INTRAVENOUS ONCE
Status: COMPLETED | OUTPATIENT
Start: 2024-04-21 | End: 2024-04-21

## 2024-04-21 RX ADMIN — DIPHENHYDRAMINE HYDROCHLORIDE 25 MG: 50 INJECTION, SOLUTION INTRAMUSCULAR; INTRAVENOUS at 00:22

## 2024-04-21 RX ADMIN — KETOROLAC TROMETHAMINE 15 MG: 15 INJECTION, SOLUTION INTRAMUSCULAR; INTRAVENOUS at 00:22

## 2024-04-21 RX ADMIN — METOCLOPRAMIDE HYDROCHLORIDE 10 MG: 5 INJECTION INTRAMUSCULAR; INTRAVENOUS at 00:22

## 2024-04-21 RX ADMIN — ONDANSETRON 4 MG: 2 INJECTION INTRAMUSCULAR; INTRAVENOUS at 00:22

## 2024-04-21 NOTE — ED PROVIDER NOTES
Time: 11:31 PM EDT  Date of encounter:  4/20/2024  Independent Historian/Clinical History and Information was obtained by:   Patient    History is limited by: N/A    Chief Complaint: Headache      History of Present Illness:  Patient is a 44 y.o. year old male who presents to the emergency department for evaluation of headache that started around 10:30 PM.  Patient also complains of odd smell in his nose as well as metallic taste in his mouth.  Has had nausea, no vomiting.  Denies blurry vision.  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  Is currently being followed by neurology and has contrast MRI scheduled for next week.    HPI    Patient Care Team  Primary Care Provider: Provider, Katherin Known    Past Medical History:     No Known Allergies  Past Medical History:   Diagnosis Date    A-fib     Head injury 1998    Headache, tension-type     Hypertension     Sleep apnea     Stroke     TIA (transient ischemic attack)      No past surgical history on file.  No family history on file.    Home Medications:  Prior to Admission medications    Medication Sig Start Date End Date Taking? Authorizing Provider   apixaban (ELIQUIS) 5 MG tablet tablet 1 tablet twice a day 4/9/24   Akil White MD   Cholecalciferol 25 MCG (1000 UT) tablet Take 2 tablets by mouth Daily.  Patient not taking: Reported on 4/9/2024    Wilfred Benoit MD   diazePAM (VALIUM) 10 MG tablet Take 1 tablet 1 hour prior to the MRI and repeat x 1 as needed 30 minutes prior to MRI.  Patient not taking: Reported on 4/9/2024 4/1/24   Devin Posadas MD   lisinopril (PRINIVIL,ZESTRIL) 20 MG tablet Take 1 tablet by mouth Daily.    Wilfred Benoit MD   Metoprolol Succinate 50 MG capsule extended-release 24 hour sprinkle Take 1 tablet by mouth Daily.    Wilfred Benoit MD   Multi Vitamin tablet tablet Take 1 tablet by mouth Daily.    Wilfred Benoit MD   omeprazole (priLOSEC) 20  "MG capsule Take 1 capsule by mouth Daily.    Provider, Wilfred, MD        Social History:   Social History     Tobacco Use    Smoking status: Never     Passive exposure: Past    Smokeless tobacco: Current     Types: Snuff   Vaping Use    Vaping status: Never Used   Substance Use Topics    Alcohol use: Yes     Alcohol/week: 40.0 standard drinks of alcohol     Types: 40 Cans of beer per week    Drug use: Not Currently         Review of Systems:  Review of Systems   Constitutional:  Negative for chills and fever.   HENT:  Negative for congestion, ear pain and sore throat.    Eyes:  Negative for pain and visual disturbance.   Respiratory:  Negative for cough, chest tightness and shortness of breath.    Cardiovascular:  Negative for chest pain.   Gastrointestinal:  Positive for nausea. Negative for abdominal pain, diarrhea and vomiting.   Genitourinary:  Negative for flank pain and hematuria.   Musculoskeletal:  Negative for joint swelling.   Skin:  Negative for pallor.   Neurological:  Positive for headaches. Negative for seizures.   All other systems reviewed and are negative.       Physical Exam:  /98 (BP Location: Right arm, Patient Position: Lying)   Pulse 90   Temp 97.6 °F (36.4 °C) (Oral)   Resp 20   Ht 182.9 cm (72\")   Wt 118 kg (260 lb)   SpO2 98%   BMI 35.26 kg/m²     Physical Exam  Vitals and nursing note reviewed.   Constitutional:       General: He is not in acute distress.     Appearance: Normal appearance. He is not toxic-appearing.   HENT:      Head: Normocephalic and atraumatic.      Right Ear: Tympanic membrane, ear canal and external ear normal.      Left Ear: Tympanic membrane, ear canal and external ear normal.      Mouth/Throat:      Mouth: Mucous membranes are moist.   Eyes:      General: No scleral icterus.     Extraocular Movements: Extraocular movements intact.      Pupils: Pupils are equal, round, and reactive to light.   Cardiovascular:      Rate and Rhythm: Normal rate and " regular rhythm.      Pulses:           Radial pulses are 2+ on the right side and 2+ on the left side.      Heart sounds: Normal heart sounds.   Pulmonary:      Effort: Pulmonary effort is normal. No respiratory distress.      Breath sounds: Normal breath sounds. No wheezing or rhonchi.   Abdominal:      General: Abdomen is flat. Bowel sounds are normal.      Palpations: Abdomen is soft.      Tenderness: There is no abdominal tenderness.   Musculoskeletal:         General: Normal range of motion.      Cervical back: Normal range of motion and neck supple.   Skin:     General: Skin is warm and dry.   Neurological:      General: No focal deficit present.      Mental Status: He is alert and oriented to person, place, and time. Mental status is at baseline.   Psychiatric:         Mood and Affect: Mood is anxious.                  Procedures:  Procedures      Medical Decision Making:      Comorbidities that affect care:    Atrial Fibrillation, Hypertension    External Notes reviewed:    Previous Clinic Note: 4/1/2024 neurology, scheduled for MRI brain with and without contrast and Previous Radiological Studies: MRI brain 2/26/2024 with possible gliosis or encephalomalacia      The following orders were placed and all results were independently analyzed by me:  Orders Placed This Encounter   Procedures    Vital Signs Recheck       Medications Given in the Emergency Department:  Medications   ondansetron (ZOFRAN) injection 4 mg (4 mg Intravenous Given 4/21/24 0022)   ketorolac (TORADOL) injection 15 mg (15 mg Intravenous Given 4/21/24 0022)   diphenhydrAMINE (BENADRYL) injection 25 mg (25 mg Intravenous Given 4/21/24 0022)   metoclopramide (REGLAN) injection 10 mg (10 mg Intravenous Given 4/21/24 0022)        ED Course:         Labs:    Lab Results (last 24 hours)       ** No results found for the last 24 hours. **             Imaging:    No Radiology Exams Resulted Within Past 24 Hours      Differential Diagnosis and  Discussion:    Headache: Differential diagnosis includes but is not limited to migraine, cluster headache, hypertension, tumor, subarachnoid bleeding, pseudotumor cerebri, temporal arteritis, infections, tension headache, and TMJ syndrome.        MDM  Number of Diagnoses or Management Options  Headache, unspecified headache type  Diagnosis management comments: The patient presented to the emergency department with a headache. The patient is now resting comfortably in feels better, is alert, talkative, interactive and in no distress after ED treatment. The patient appears well and is able to tolerate PO fluids. Repeat examination is unremarkable and benign. The patient is neurologically intact, has a normal mental status, and this ambulatory in the ED. The history, exam, diagnostic testing (if any) and the patient's current condition do not suggest meningitis, stroke, sepsis, subarachnoid hemorrhage, intracranial bleeding, encephalitis, temporal arteritis or other significant pathology to warrant further testing, continued ED treatment, admission, neurological consultation, for other specialist evaluation at this point. The vital signs have been stable. The patient's condition is stable and appropriate for discharge. The patient will pursue further outpatient evaluation with the primary care physician or other designated or consulting physician as indicated in the discharge instructions.    Risk of Complications, Morbidity, and/or Mortality  Presenting problems: low  Diagnostic procedures: minimal  Management options: minimal    Patient Progress  Patient progress: improved                 Patient Care Considerations:    CT HEAD: I considered ordering a noncontrast CT of the head, however patient had no neurological deficit.      Consultants/Shared Management Plan:    SHARED VISIT: I have discussed the case with my supervising physician, Dr. Aguilar who states CT imaging today is not warranted as patient does not have a  neurological deficit.. The substantive portion of the medical decision was made by the attesting physician who made or approve the management plan and will take responsibility for the patient.  Clinical findings were discussed and ultimate disposition was made in consult with supervising physician.    Social Determinants of Health:    Patient is independent, reliable, and has access to care.       Disposition and Care Coordination:    Discharged: The patient is suitable and stable for discharge with no need for consideration of admission.        Final diagnoses:   Headache, unspecified headache type        ED Disposition       ED Disposition   Discharge    Condition   Stable    Comment   --               This medical record created using voice recognition software.             Maria E Forrest, APRN  04/21/24 0107

## 2024-04-21 NOTE — DISCHARGE INSTRUCTIONS
Follow-up with your neurologist as scheduled.  You may also want to discuss expediting MRI.    Return for worsening symptoms or any new concerns.

## 2024-05-13 ENCOUNTER — TELEPHONE (OUTPATIENT)
Dept: NEUROLOGY | Facility: CLINIC | Age: 44
End: 2024-05-13
Payer: OTHER GOVERNMENT

## 2024-05-13 DIAGNOSIS — F41.9 ANXIETY: ICD-10-CM

## 2024-05-13 NOTE — TELEPHONE ENCOUNTER
Patient came into office states he missed his MRI appointment, gave him the number to call and reschedule, patient is asking for a small dose valium to take when he goes for the MRI, please advise

## 2024-05-13 NOTE — TELEPHONE ENCOUNTER
Dr. Posadas script sent in April for pt. Called pt and advised he call the [facility] pharmacy and let us know if it needs to be resent or sent to another pharmacy.  Pt verbalized understanding.     Pt stated MRI scheduled for 5/15/24 @ 0630.

## 2024-05-15 ENCOUNTER — HOSPITAL ENCOUNTER (OUTPATIENT)
Dept: MRI IMAGING | Facility: HOSPITAL | Age: 44
Discharge: HOME | End: 2024-05-15
Admitting: PSYCHIATRY & NEUROLOGY
Payer: OTHER GOVERNMENT

## 2024-05-15 DIAGNOSIS — R90.89 MRI OF BRAIN ABNORMAL: ICD-10-CM

## 2024-05-15 DIAGNOSIS — G45.9 TIA (TRANSIENT ISCHEMIC ATTACK): ICD-10-CM

## 2024-05-15 DIAGNOSIS — I48.0 PAROXYSMAL ATRIAL FIBRILLATION: ICD-10-CM

## 2024-05-15 PROCEDURE — 0 GADOBENATE DIMEGLUMINE 529 MG/ML SOLUTION: Performed by: PSYCHIATRY & NEUROLOGY

## 2024-05-15 PROCEDURE — 70553 MRI BRAIN STEM W/O & W/DYE: CPT

## 2024-05-15 PROCEDURE — A9577 INJ MULTIHANCE: HCPCS | Performed by: PSYCHIATRY & NEUROLOGY

## 2024-05-15 RX ADMIN — GADOBENATE DIMEGLUMINE 20 ML: 529 INJECTION, SOLUTION INTRAVENOUS at 07:15

## 2024-05-20 ENCOUNTER — TELEPHONE (OUTPATIENT)
Dept: CARDIOLOGY | Facility: CLINIC | Age: 44
End: 2024-05-20
Payer: OTHER GOVERNMENT

## 2024-05-20 NOTE — TELEPHONE ENCOUNTER
Karlie Bermudez, APRN  5/20/2024  8:51 AM EDT Back to Top      Notify patient the results of their Holter monitor were unremarkable. There were some occasional skipped beats noted but no ectopy or concerning arrhythmias.     Left a message for pt to give us a call back for his results. I will try to give pt a call back.

## 2024-06-03 ENCOUNTER — OFFICE VISIT (OUTPATIENT)
Dept: NEUROLOGY | Facility: CLINIC | Age: 44
End: 2024-06-03
Payer: OTHER GOVERNMENT

## 2024-06-03 VITALS
HEIGHT: 72 IN | WEIGHT: 252 LBS | SYSTOLIC BLOOD PRESSURE: 106 MMHG | DIASTOLIC BLOOD PRESSURE: 78 MMHG | HEART RATE: 99 BPM | BODY MASS INDEX: 34.13 KG/M2

## 2024-06-03 DIAGNOSIS — R90.89 MRI OF BRAIN ABNORMAL: ICD-10-CM

## 2024-06-03 DIAGNOSIS — I48.0 PAROXYSMAL ATRIAL FIBRILLATION: ICD-10-CM

## 2024-06-03 DIAGNOSIS — G40.109 LOCALIZATION-RELATED SYMPTOMATIC EPILEPSY AND EPILEPTIC SYNDROMES WITH SIMPLE PARTIAL SEIZURES, NOT INTRACTABLE, WITHOUT STATUS EPILEPTICUS: Primary | ICD-10-CM

## 2024-06-03 DIAGNOSIS — R45.86 MOOD SWINGS: ICD-10-CM

## 2024-06-03 DIAGNOSIS — F43.12 CHRONIC POST-TRAUMATIC STRESS DISORDER (PTSD): ICD-10-CM

## 2024-06-03 PROBLEM — G45.9 TIA (TRANSIENT ISCHEMIC ATTACK): Status: RESOLVED | Noted: 2024-02-26 | Resolved: 2024-06-03

## 2024-06-03 PROCEDURE — 99215 OFFICE O/P EST HI 40 MIN: CPT | Performed by: PSYCHIATRY & NEUROLOGY

## 2024-06-03 RX ORDER — LACOSAMIDE 150 MG/1
150 TABLET ORAL EVERY 12 HOURS SCHEDULED
Qty: 60 TABLET | Refills: 5 | Status: SHIPPED | OUTPATIENT
Start: 2024-06-03

## 2024-06-03 RX ORDER — LACOSAMIDE 50 MG/1
TABLET ORAL
Qty: 14 TABLET | Refills: 0 | Status: SHIPPED | OUTPATIENT
Start: 2024-06-03

## 2024-06-03 RX ORDER — ASPIRIN 81 MG/1
TABLET, CHEWABLE ORAL
Qty: 90 TABLET | Refills: 3 | Status: SHIPPED | OUTPATIENT
Start: 2024-06-03

## 2024-06-03 RX ORDER — LACOSAMIDE 100 MG/1
TABLET ORAL
Qty: 14 TABLET | Refills: 0 | Status: SHIPPED | OUTPATIENT
Start: 2024-06-03

## 2024-06-03 NOTE — ASSESSMENT & PLAN NOTE
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  Discussed with him that this is the most likely etiology for his partial seizures with alteration of awareness.

## 2024-06-03 NOTE — ASSESSMENT & PLAN NOTE
He is agreeable to see psychiatry for diagnosis.  His brother thinks that he has mood swings secondary to PTSD.  Are both in the [employer] in the past.

## 2024-06-03 NOTE — ASSESSMENT & PLAN NOTE
The 28-day cardiac event monitor did not show atrial fibrillation.  He ran out of Eliquis.  He did not follow-up with cardiology.  I discussed with him that it is unlikely that he had a stroke and most likely the encephalomalacia noted in the left temporal lobe is secondary to a previous head injury.  I do not know where he got the diagnosis of atrial fibrillation since he is also being followed at the [facility].  He saw cardiology after his initial visit with me and Dr. Gonsalez advised him to continue taking Eliquis until the next clinic visit.  He tells me that he understood that he was fine and there was nothing wrong with his heart since he is event monitor was negative and he was told to stop taking Eliquis.  I discussed with him that nobody told him to stop Eliquis.  Since my clinical diagnosis is that he did not have a stroke I will him on aspirin 81 mg daily and he is to follow-up with cardiology for documentation that he does not have atrial fibrillation and that he can stop taking Eliquis.  He is agreeable to this.  We will make an appointment to see cardiology again.

## 2024-06-03 NOTE — PROGRESS NOTES
Chief Complaint  Results (Review MRI Brain completed on 05/15/24 @ [facility] )    Ree Moran is a 44 y.o. male who presents to [facility]  History of Present Illness  44-year-old man here for follow-up of his MRI of the brain as well as he spells.  His brother is with him today.  MRI of the brain shows the same lesions that were noted in the right temporal lobe.  There is no change.  His brother tells me that he has seen at least 4 spells in which the patient was poorly responsive.  The time he was brought to the hospital and admitted.  His brother states that he was staring and poorly responsive for 40 minutes.  He was confused.  He tells me that he has not had a spell like that and most of his spells that he feels a numbness that goes up from his right leg to the back of his head and all over his head and he gets up and walks around to make sure that he is okay.  He also has a smell of flowers or something intense as well as a regular taste when this happens.  He states that he just walks around and checks to make sure that everything is okay.  When he was walking around And opening the drawers show me what he would do to make sure that he is able to do activities and there is no focal weakness.  He has not had any more spells of language difficulty.  He had several of the spells in which he would have this feeling of numbness going to his right leg and up his right side to his head.  He is never passed out according to him.  He does not think that he is having strokes.    His apixaban ran out.  This was started in the hospital and he had a cardiac event monitor which shows his lowest heart rate was 6 bpm average heart rate was 95 and the maximum 151.  There were rare PACs and PVCs.  Patient activated 2 events which responded to normal sinus rhythm.  No abnormal rhythms noted.  He did not follow-up with cardiology.    I reviewed with him the MRI of the  "brain with and without contrast showing some signal within  the left temporal lobe that could relate to an old insult.  No new significant findings are seen compared to an MRI of the brain that was done 4/26/2024.  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    His mother states that he has mood swings.  He wants him to be evaluated for PTSD.  Objective   Vital Signs:   /78 (BP Location: Right arm, Patient Position: Sitting, Cuff Size: Adult)   Pulse 99   Ht 182.7 cm (71.93\")   Wt 114 kg (252 lb)   BMI 34.25 kg/m²     Physical Exam   He is alert, fluent, phasic, follows commands well.  Heart is regular and rhythm normal in rate.        Assessment and Plan  Diagnoses and all orders for this visit:    1. Localization-related symptomatic epilepsy and epileptic syndromes with simple partial seizures, not intractable, without status epilepticus (Primary)  Assessment & Plan:  Discussed with him that my diagnosis is that he has partial seizure with alteration of awareness.  I discussed with him that the spells that he is having initially when he last admitted to the hospital in which he had staring spells and confusion is secondary to a seizure.  It is also highly likely that he has seizures coming from the left temporal lobe lesion and that he has an aura consisting of sensory symptoms on his right side spreading to to his head with olfactory and taste hallucinations.  I will start him on Vimpat at 50 mg twice a day for 1 week, 100 mg twice a day the second week, 150 mg twice a day the third week and on.  I discussed with him regarding the adverse effects of Vimpat.    I discussed with him regarding [place] driving laws.  I discussed with him that he cannot drive for 3 months after his last seizure which was last week.  I discussed with him the potential risk for injury and death to himself or others if he drives.  I " will also refer him to the [facility] epilepsy clinic to determine if there is epileptiform discharges coming from the left temporal lobe.    I will see him again in 2 months time for follow-up.  Thank you for letting me participate in his care.    Orders:  -     lacosamide (VIMPAT) 50 MG tablet tablet; 1 Tablet twice a day first week  Dispense: 14 tablet; Refill: 0  -     lacosamide (VIMPAT) 100 MG tablet tablet; 1 tablet twice a day second week  Dispense: 14 tablet; Refill: 0  -     lacosamide 150 MG tablet; Take 150 mg by mouth Every 12 (Twelve) Hours. 1 tablet twice a day third week and on  Dispense: 60 tablet; Refill: 5  -     Ambulatory Referral to Psychiatry  -     Ambulatory Referral to Neurology    2. Mood swings  Assessment & Plan:  He is agreeable to see psychiatry for diagnosis.  His brother thinks that he has mood swings secondary to PTSD.  Are both in the [employer] in the past.    Orders:  -     Ambulatory Referral to Psychiatry    3. Chronic post-traumatic stress disorder (PTSD)  -     Ambulatory Referral to Psychiatry    4. Paroxysmal atrial fibrillation  Assessment & Plan:  The 28-day cardiac event monitor did not show atrial fibrillation.  He ran out of Eliquis.  He did not follow-up with cardiology.  I discussed with him that it is unlikely that he had a stroke and most likely the encephalomalacia noted in the left temporal lobe is secondary to a previous head injury.  I do not know where he got the diagnosis of atrial fibrillation since he is also being followed at the [facility].  He saw cardiology after his initial visit with me and Dr. Gonsalez advised him to continue taking Eliquis until the next clinic visit.  He tells me that he understood that he was fine and there was nothing wrong with his heart since he is event monitor was negative and he was told to stop taking Eliquis.  I discussed with him that nobody told him to stop Eliquis.  Since my clinical diagnosis is that  he did not have a stroke I will him on aspirin 81 mg daily and he is to follow-up with cardiology for documentation that he does not have atrial fibrillation and that he can stop taking Eliquis.  He is agreeable to this.  We will make an appointment to see cardiology again.    Orders:  -     Ambulatory Referral to Cardiology    5. MRI of brain abnormal  Assessment & Plan:  Abnormality noted on MRI of the brain is most likely secondary to encephalomalacia from previous head injury.  Discussed with him that this is the most likely etiology for his partial seizures with alteration of awareness.      Other orders  -     aspirin (Aspirin Childrens) 81 MG chewable tablet; 1 p.o. daily  Dispense: 90 tablet; Refill: 3         Total time spent with the patient and coordinating patient care was 45 minutes.    Follow Up  No follow-ups on file.  Patient was given instructions and counseling regarding his condition or for health maintenance advice. Please see specific information pulled into the AVS if appropriate.

## 2024-06-03 NOTE — ASSESSMENT & PLAN NOTE
Discussed with him that my diagnosis is that he has partial seizure with alteration of awareness.  I discussed with him that the spells that he is having initially when he last admitted to the hospital in which he had staring spells and confusion is secondary to a seizure.  It is also highly likely that he has seizures coming from the left temporal lobe lesion and that he has an aura consisting of sensory symptoms on his right side spreading to to his head with olfactory and taste hallucinations.  I will start him on Vimpat at 50 mg twice a day for 1 week, 100 mg twice a day the second week, 150 mg twice a day the third week and on.  I discussed with him regarding the adverse effects of Vimpat.    I discussed with him regarding [place] driving laws.  I discussed with him that he cannot drive for 3 months after his last seizure which was last week.  I discussed with him the potential risk for injury and death to himself or others if he drives.  I will also refer him to the [facility] epilepsy clinic to determine if there is epileptiform discharges coming from the left temporal lobe.    I will see him again in 2 months time for follow-up.  Thank you for letting me participate in his care.

## 2024-06-26 RX ORDER — LISINOPRIL 20 MG/1
20 TABLET ORAL DAILY
Qty: 90 TABLET | Refills: 1 | OUTPATIENT
Start: 2024-06-26

## 2024-06-26 RX ORDER — METOPROLOL SUCCINATE 50 MG/1
50 TABLET, EXTENDED RELEASE ORAL DAILY
Qty: 90 TABLET | Refills: 1 | OUTPATIENT
Start: 2024-06-26

## 2024-08-15 ENCOUNTER — TELEPHONE (OUTPATIENT)
Dept: NEUROLOGY | Facility: CLINIC | Age: 44
End: 2024-08-15
Payer: OTHER GOVERNMENT

## 2024-08-15 NOTE — TELEPHONE ENCOUNTER
Caller: Ahsan Siddiqui call back number:   Telephone Information:   Mobile [phone]         Which medication are you concerned about: lacosamide (VIMPAT) 50 MG tablet tablet     What are your concerns:  HE STATES SINCE STARTING THE MEDICATION HE HAS BEEN EXPERIENCING: DIZZINESS, NAUSEA AND VOMITING, FATIGUE, DOUBLE VISION, AUDITORY HALLUCINATIONS, EFFECTING DRIVING ABILITY'S     How long have you had these concerns: 6 WEEKS

## 2024-08-16 NOTE — TELEPHONE ENCOUNTER
Spoke with pt on the phone. He stated he is taking 150mg BID. He would like to come off of the medication all together. He does not like the way it makes him feel and he feels more anxious due to the side effects. Side effects are dizziness, nausea, double vision.    His appt at  is 12-3-24.

## 2024-08-19 DIAGNOSIS — G40.109 LOCALIZATION-RELATED SYMPTOMATIC EPILEPSY AND EPILEPTIC SYNDROMES WITH SIMPLE PARTIAL SEIZURES, NOT INTRACTABLE, WITHOUT STATUS EPILEPTICUS: Primary | ICD-10-CM

## 2024-08-19 RX ORDER — LACOSAMIDE 50 MG/1
TABLET ORAL
Qty: 77 TABLET | Refills: 0 | Status: SHIPPED | OUTPATIENT
Start: 2024-08-19

## 2024-08-20 NOTE — TELEPHONE ENCOUNTER
Attempted to call pt. Left voicemail for pt to call office at earliest convenience and ask to speak with Yamile or Maldonado.

## 2024-08-28 ENCOUNTER — TELEPHONE (OUTPATIENT)
Dept: NEUROLOGY | Facility: CLINIC | Age: 44
End: 2024-08-28
Payer: OTHER GOVERNMENT

## 2024-08-28 NOTE — TELEPHONE ENCOUNTER
Caller: Ahsan Moran    Relationship: Self    Best call back number: [phone]     What was the call regarding: PATIENT RECEIVED HIS LACOSAMIDE RX. HE SAID THAT THERE ARE 77 PILLS AND NO INSTRUCTIONS REGARDING THE TAPERING OFF PROCESS FROM THE PHARMACY     HE WOULD LIKE SOMEONE TO CALL/TEXT AND GIVE HIM INSTRUCTIONS FOR THE MEDICATION PROCESS.     HE SAID HE HAS HIS FINAL DOSAGE OF HIS OG AMOUNT 150 IN THE AM  IN THE PM TODAY AND THAT HE IS TO START THE 50 MG ONE IN THE AM TO ONE IN THE PM BUT HE IS NOT SURE HOW LONG OR WHAT TO CHANGE TO AFTER THAT.     PLEASE REVIEW  THANK YOU

## 2024-08-28 NOTE — TELEPHONE ENCOUNTER
"Spoke w/ patient, explained titration per sig on RX \" 2 tablets twice a day x 14 days, 1 tablet twice a day x 14 days, 1 tablet daily x 1 week then stop. \"    Patient wrote down instructions and read back, verbally verified understanding. Will call back with any further questions.  "

## 2024-12-14 ENCOUNTER — APPOINTMENT (OUTPATIENT)
Dept: CT IMAGING | Facility: HOSPITAL | Age: 44
DRG: 439 | End: 2024-12-14
Payer: OTHER GOVERNMENT

## 2024-12-14 ENCOUNTER — ANESTHESIA EVENT (OUTPATIENT)
Dept: PERIOP | Facility: HOSPITAL | Age: 44
End: 2024-12-14
Payer: OTHER GOVERNMENT

## 2024-12-14 ENCOUNTER — HOSPITAL ENCOUNTER (INPATIENT)
Facility: HOSPITAL | Age: 44
LOS: 1 days | Discharge: HOME | DRG: 439 | End: 2024-12-15
Attending: EMERGENCY MEDICINE | Admitting: INTERNAL MEDICINE
Payer: OTHER GOVERNMENT

## 2024-12-14 ENCOUNTER — APPOINTMENT (OUTPATIENT)
Dept: MRI IMAGING | Facility: HOSPITAL | Age: 44
DRG: 439 | End: 2024-12-14
Payer: OTHER GOVERNMENT

## 2024-12-14 ENCOUNTER — ANESTHESIA (OUTPATIENT)
Dept: PERIOP | Facility: HOSPITAL | Age: 44
End: 2024-12-14
Payer: OTHER GOVERNMENT

## 2024-12-14 DIAGNOSIS — R10.12 LEFT UPPER QUADRANT ABDOMINAL PAIN: ICD-10-CM

## 2024-12-14 DIAGNOSIS — K92.0 HEMATEMESIS: ICD-10-CM

## 2024-12-14 DIAGNOSIS — K92.0 HEMATEMESIS WITH NAUSEA: Primary | ICD-10-CM

## 2024-12-14 DIAGNOSIS — K29.81 DUODENITIS WITH BLEEDING: ICD-10-CM

## 2024-12-14 LAB
ABO GROUP BLD: NORMAL
ABO GROUP BLD: NORMAL
ALBUMIN SERPL-MCNC: 4.1 G/DL (ref 3.5–5.2)
ALBUMIN/GLOB SERPL: 1.3 G/DL
ALP SERPL-CCNC: 90 U/L (ref 39–117)
ALT SERPL W P-5'-P-CCNC: 17 U/L (ref 1–41)
ANION GAP SERPL CALCULATED.3IONS-SCNC: 15.6 MMOL/L (ref 5–15)
AST SERPL-CCNC: 39 U/L (ref 1–40)
BASOPHILS # BLD AUTO: 0.03 10*3/MM3 (ref 0–0.2)
BASOPHILS NFR BLD AUTO: 0.7 % (ref 0–1.5)
BILIRUB SERPL-MCNC: 0.4 MG/DL (ref 0–1.2)
BILIRUB UR QL STRIP: NEGATIVE
BLD GP AB SCN SERPL QL: NEGATIVE
BUN SERPL-MCNC: 10 MG/DL (ref 6–20)
BUN/CREAT SERPL: 15.6 (ref 7–25)
CALCIUM SPEC-SCNC: 8.7 MG/DL (ref 8.6–10.5)
CHLORIDE SERPL-SCNC: 101 MMOL/L (ref 98–107)
CLARITY UR: CLEAR
CO2 SERPL-SCNC: 22.4 MMOL/L (ref 22–29)
COLOR UR: YELLOW
CREAT SERPL-MCNC: 0.64 MG/DL (ref 0.76–1.27)
DEPRECATED RDW RBC AUTO: 41.7 FL (ref 37–54)
EGFRCR SERPLBLD CKD-EPI 2021: 119 ML/MIN/1.73
EOSINOPHIL # BLD AUTO: 0.03 10*3/MM3 (ref 0–0.4)
EOSINOPHIL NFR BLD AUTO: 0.7 % (ref 0.3–6.2)
ERYTHROCYTE [DISTWIDTH] IN BLOOD BY AUTOMATED COUNT: 12.2 % (ref 12.3–15.4)
GLOBULIN UR ELPH-MCNC: 3.2 GM/DL
GLUCOSE SERPL-MCNC: 110 MG/DL (ref 65–99)
GLUCOSE UR STRIP-MCNC: NEGATIVE MG/DL
HCT VFR BLD AUTO: 41.5 % (ref 37.5–51)
HEMOCCULT STL QL IA: POSITIVE
HGB BLD-MCNC: 14.8 G/DL (ref 13–17.7)
HGB UR QL STRIP.AUTO: NEGATIVE
HOLD SPECIMEN: NORMAL
HOLD SPECIMEN: NORMAL
IMM GRANULOCYTES # BLD AUTO: 0.02 10*3/MM3 (ref 0–0.05)
IMM GRANULOCYTES NFR BLD AUTO: 0.5 % (ref 0–0.5)
KETONES UR QL STRIP: NEGATIVE
LEUKOCYTE ESTERASE UR QL STRIP.AUTO: NEGATIVE
LIPASE SERPL-CCNC: 24 U/L (ref 13–60)
LYMPHOCYTES # BLD AUTO: 1.38 10*3/MM3 (ref 0.7–3.1)
LYMPHOCYTES NFR BLD AUTO: 34.2 % (ref 19.6–45.3)
MCH RBC QN AUTO: 33.1 PG (ref 26.6–33)
MCHC RBC AUTO-ENTMCNC: 35.7 G/DL (ref 31.5–35.7)
MCV RBC AUTO: 92.8 FL (ref 79–97)
MONOCYTES # BLD AUTO: 0.38 10*3/MM3 (ref 0.1–0.9)
MONOCYTES NFR BLD AUTO: 9.4 % (ref 5–12)
NEUTROPHILS NFR BLD AUTO: 2.2 10*3/MM3 (ref 1.7–7)
NEUTROPHILS NFR BLD AUTO: 54.5 % (ref 42.7–76)
NITRITE UR QL STRIP: NEGATIVE
NRBC BLD AUTO-RTO: 0 /100 WBC (ref 0–0.2)
PH UR STRIP.AUTO: 8 [PH] (ref 5–8)
PLATELET # BLD AUTO: 164 10*3/MM3 (ref 140–450)
PMV BLD AUTO: 9.1 FL (ref 6–12)
POTASSIUM SERPL-SCNC: 3.9 MMOL/L (ref 3.5–5.2)
PROT SERPL-MCNC: 7.3 G/DL (ref 6–8.5)
PROT UR QL STRIP: ABNORMAL
RBC # BLD AUTO: 4.47 10*6/MM3 (ref 4.14–5.8)
RH BLD: POSITIVE
RH BLD: POSITIVE
SODIUM SERPL-SCNC: 139 MMOL/L (ref 136–145)
SP GR UR STRIP: >1.03 (ref 1–1.03)
T&S EXPIRATION DATE: NORMAL
UROBILINOGEN UR QL STRIP: ABNORMAL
WBC NRBC COR # BLD AUTO: 4.04 10*3/MM3 (ref 3.4–10.8)
WHOLE BLOOD HOLD COAG: NORMAL
WHOLE BLOOD HOLD SPECIMEN: NORMAL

## 2024-12-14 PROCEDURE — 86900 BLOOD TYPING SEROLOGIC ABO: CPT | Performed by: EMERGENCY MEDICINE

## 2024-12-14 PROCEDURE — 25810000003 SODIUM CHLORIDE 0.9 % SOLUTION: Performed by: FAMILY MEDICINE

## 2024-12-14 PROCEDURE — 86901 BLOOD TYPING SEROLOGIC RH(D): CPT | Performed by: EMERGENCY MEDICINE

## 2024-12-14 PROCEDURE — 25010000002 DIAZEPAM PER 5 MG: Performed by: INTERNAL MEDICINE

## 2024-12-14 PROCEDURE — 25010000002 MAGNESIUM SULFATE PER 500 MG OF MAGNESIUM: Performed by: NURSE ANESTHETIST, CERTIFIED REGISTERED

## 2024-12-14 PROCEDURE — 88305 TISSUE EXAM BY PATHOLOGIST: CPT | Performed by: INTERNAL MEDICINE

## 2024-12-14 PROCEDURE — 25010000002 PROPOFOL 10 MG/ML EMULSION: Performed by: NURSE ANESTHETIST, CERTIFIED REGISTERED

## 2024-12-14 PROCEDURE — 74177 CT ABD & PELVIS W/CONTRAST: CPT

## 2024-12-14 PROCEDURE — 74181 MRI ABDOMEN W/O CONTRAST: CPT

## 2024-12-14 PROCEDURE — 25010000002 THIAMINE PER 100 MG: Performed by: FAMILY MEDICINE

## 2024-12-14 PROCEDURE — 86850 RBC ANTIBODY SCREEN: CPT | Performed by: EMERGENCY MEDICINE

## 2024-12-14 PROCEDURE — 86900 BLOOD TYPING SEROLOGIC ABO: CPT

## 2024-12-14 PROCEDURE — 86901 BLOOD TYPING SEROLOGIC RH(D): CPT

## 2024-12-14 PROCEDURE — 25010000002 FENTANYL CITRATE (PF) 50 MCG/ML SOLUTION: Performed by: NURSE ANESTHETIST, CERTIFIED REGISTERED

## 2024-12-14 PROCEDURE — 83690 ASSAY OF LIPASE: CPT | Performed by: EMERGENCY MEDICINE

## 2024-12-14 PROCEDURE — 82274 ASSAY TEST FOR BLOOD FECAL: CPT | Performed by: EMERGENCY MEDICINE

## 2024-12-14 PROCEDURE — 25010000002 ONDANSETRON PER 1 MG: Performed by: NURSE ANESTHETIST, CERTIFIED REGISTERED

## 2024-12-14 PROCEDURE — 25010000002 DEXAMETHASONE PER 1 MG: Performed by: NURSE ANESTHETIST, CERTIFIED REGISTERED

## 2024-12-14 PROCEDURE — 99222 1ST HOSP IP/OBS MODERATE 55: CPT | Performed by: FAMILY MEDICINE

## 2024-12-14 PROCEDURE — 25010000002 SUGAMMADEX 200 MG/2ML SOLUTION: Performed by: NURSE ANESTHETIST, CERTIFIED REGISTERED

## 2024-12-14 PROCEDURE — 43239 EGD BIOPSY SINGLE/MULTIPLE: CPT | Performed by: INTERNAL MEDICINE

## 2024-12-14 PROCEDURE — 85025 COMPLETE CBC W/AUTO DIFF WBC: CPT | Performed by: EMERGENCY MEDICINE

## 2024-12-14 PROCEDURE — 80053 COMPREHEN METABOLIC PANEL: CPT | Performed by: EMERGENCY MEDICINE

## 2024-12-14 PROCEDURE — 25010000002 LIDOCAINE PF 2% 2 % SOLUTION: Performed by: NURSE ANESTHETIST, CERTIFIED REGISTERED

## 2024-12-14 PROCEDURE — 25010000002 MIDAZOLAM PER 1MG: Performed by: NURSE ANESTHETIST, CERTIFIED REGISTERED

## 2024-12-14 PROCEDURE — 25010000002 ONDANSETRON PER 1 MG: Performed by: EMERGENCY MEDICINE

## 2024-12-14 PROCEDURE — 99285 EMERGENCY DEPT VISIT HI MDM: CPT

## 2024-12-14 PROCEDURE — 0DB98ZX EXCISION OF DUODENUM, VIA NATURAL OR ARTIFICIAL OPENING ENDOSCOPIC, DIAGNOSTIC: ICD-10-PCS | Performed by: INTERNAL MEDICINE

## 2024-12-14 PROCEDURE — 81003 URINALYSIS AUTO W/O SCOPE: CPT | Performed by: EMERGENCY MEDICINE

## 2024-12-14 PROCEDURE — 25810000003 SODIUM CHLORIDE 0.9 % SOLUTION: Performed by: EMERGENCY MEDICINE

## 2024-12-14 PROCEDURE — 25010000002 ESMOLOL 100 MG/10ML SOLUTION: Performed by: NURSE ANESTHETIST, CERTIFIED REGISTERED

## 2024-12-14 PROCEDURE — 25510000001 IOPAMIDOL PER 1 ML: Performed by: EMERGENCY MEDICINE

## 2024-12-14 PROCEDURE — 99222 1ST HOSP IP/OBS MODERATE 55: CPT | Performed by: INTERNAL MEDICINE

## 2024-12-14 RX ORDER — ONDANSETRON 2 MG/ML
4 INJECTION INTRAMUSCULAR; INTRAVENOUS ONCE
Status: COMPLETED | OUTPATIENT
Start: 2024-12-14 | End: 2024-12-14

## 2024-12-14 RX ORDER — METOPROLOL SUCCINATE 50 MG/1
50 TABLET, EXTENDED RELEASE ORAL
Status: DISCONTINUED | OUTPATIENT
Start: 2024-12-14 | End: 2024-12-15 | Stop reason: HOSPADM

## 2024-12-14 RX ORDER — DIAZEPAM 10 MG/2ML
5 INJECTION, SOLUTION INTRAMUSCULAR; INTRAVENOUS ONCE AS NEEDED
Status: COMPLETED | OUTPATIENT
Start: 2024-12-14 | End: 2024-12-14

## 2024-12-14 RX ORDER — BISACODYL 5 MG/1
5 TABLET, DELAYED RELEASE ORAL DAILY PRN
Status: DISCONTINUED | OUTPATIENT
Start: 2024-12-14 | End: 2024-12-15 | Stop reason: HOSPADM

## 2024-12-14 RX ORDER — PANTOPRAZOLE SODIUM 40 MG/10ML
40 INJECTION, POWDER, LYOPHILIZED, FOR SOLUTION INTRAVENOUS
Status: DISCONTINUED | OUTPATIENT
Start: 2024-12-14 | End: 2024-12-14

## 2024-12-14 RX ORDER — MIDAZOLAM HYDROCHLORIDE 2 MG/2ML
INJECTION, SOLUTION INTRAMUSCULAR; INTRAVENOUS
Status: COMPLETED
Start: 2024-12-14 | End: 2024-12-14

## 2024-12-14 RX ORDER — MIDAZOLAM HYDROCHLORIDE 2 MG/2ML
INJECTION, SOLUTION INTRAMUSCULAR; INTRAVENOUS AS NEEDED
Status: DISCONTINUED | OUTPATIENT
Start: 2024-12-14 | End: 2024-12-14 | Stop reason: SURG

## 2024-12-14 RX ORDER — DEXAMETHASONE SODIUM PHOSPHATE 4 MG/ML
INJECTION, SOLUTION INTRA-ARTICULAR; INTRALESIONAL; INTRAMUSCULAR; INTRAVENOUS; SOFT TISSUE AS NEEDED
Status: DISCONTINUED | OUTPATIENT
Start: 2024-12-14 | End: 2024-12-14 | Stop reason: SURG

## 2024-12-14 RX ORDER — POLYETHYLENE GLYCOL 3350 17 G/17G
17 POWDER, FOR SOLUTION ORAL DAILY PRN
Status: CANCELLED | OUTPATIENT
Start: 2024-12-14

## 2024-12-14 RX ORDER — BISACODYL 10 MG
10 SUPPOSITORY, RECTAL RECTAL DAILY PRN
Status: DISCONTINUED | OUTPATIENT
Start: 2024-12-14 | End: 2024-12-15 | Stop reason: HOSPADM

## 2024-12-14 RX ORDER — SODIUM CHLORIDE 0.9 % (FLUSH) 0.9 %
10 SYRINGE (ML) INJECTION EVERY 12 HOURS SCHEDULED
Status: DISCONTINUED | OUTPATIENT
Start: 2024-12-14 | End: 2024-12-15 | Stop reason: HOSPADM

## 2024-12-14 RX ORDER — ENOXAPARIN SODIUM 100 MG/ML
40 INJECTION SUBCUTANEOUS DAILY
Status: DISCONTINUED | OUTPATIENT
Start: 2024-12-14 | End: 2024-12-14

## 2024-12-14 RX ORDER — IOPAMIDOL 755 MG/ML
100 INJECTION, SOLUTION INTRAVASCULAR
Status: COMPLETED | OUTPATIENT
Start: 2024-12-14 | End: 2024-12-14

## 2024-12-14 RX ORDER — ONDANSETRON 2 MG/ML
4 INJECTION INTRAMUSCULAR; INTRAVENOUS EVERY 6 HOURS PRN
Status: CANCELLED | OUTPATIENT
Start: 2024-12-14

## 2024-12-14 RX ORDER — AMOXICILLIN 250 MG
2 CAPSULE ORAL 2 TIMES DAILY PRN
Status: CANCELLED | OUTPATIENT
Start: 2024-12-14

## 2024-12-14 RX ORDER — HYDROMORPHONE HYDROCHLORIDE 1 MG/ML
0.5 INJECTION, SOLUTION INTRAMUSCULAR; INTRAVENOUS; SUBCUTANEOUS
Status: DISCONTINUED | OUTPATIENT
Start: 2024-12-14 | End: 2024-12-14

## 2024-12-14 RX ORDER — ENOXAPARIN SODIUM 100 MG/ML
40 INJECTION SUBCUTANEOUS DAILY
Status: CANCELLED | OUTPATIENT
Start: 2024-12-14

## 2024-12-14 RX ORDER — SODIUM CHLORIDE 0.9 % (FLUSH) 0.9 %
10 SYRINGE (ML) INJECTION AS NEEDED
Status: CANCELLED | OUTPATIENT
Start: 2024-12-14

## 2024-12-14 RX ORDER — HYDROMORPHONE HYDROCHLORIDE 1 MG/ML
0.25 INJECTION, SOLUTION INTRAMUSCULAR; INTRAVENOUS; SUBCUTANEOUS
Status: DISCONTINUED | OUTPATIENT
Start: 2024-12-14 | End: 2024-12-14

## 2024-12-14 RX ORDER — OXYCODONE HYDROCHLORIDE 5 MG/1
5 TABLET ORAL
Status: DISCONTINUED | OUTPATIENT
Start: 2024-12-14 | End: 2024-12-14

## 2024-12-14 RX ORDER — PROMETHAZINE HYDROCHLORIDE 12.5 MG/1
25 TABLET ORAL ONCE AS NEEDED
Status: DISCONTINUED | OUTPATIENT
Start: 2024-12-14 | End: 2024-12-14

## 2024-12-14 RX ORDER — SODIUM CHLORIDE 9 MG/ML
100 INJECTION, SOLUTION INTRAVENOUS CONTINUOUS
Status: ACTIVE | OUTPATIENT
Start: 2024-12-14 | End: 2024-12-15

## 2024-12-14 RX ORDER — ESMOLOL HYDROCHLORIDE 10 MG/ML
INJECTION INTRAVENOUS AS NEEDED
Status: DISCONTINUED | OUTPATIENT
Start: 2024-12-14 | End: 2024-12-14 | Stop reason: SURG

## 2024-12-14 RX ORDER — FENTANYL CITRATE 50 UG/ML
INJECTION, SOLUTION INTRAMUSCULAR; INTRAVENOUS AS NEEDED
Status: DISCONTINUED | OUTPATIENT
Start: 2024-12-14 | End: 2024-12-14 | Stop reason: SURG

## 2024-12-14 RX ORDER — MAGNESIUM SULFATE HEPTAHYDRATE 500 MG/ML
INJECTION, SOLUTION INTRAMUSCULAR; INTRAVENOUS AS NEEDED
Status: DISCONTINUED | OUTPATIENT
Start: 2024-12-14 | End: 2024-12-14 | Stop reason: SURG

## 2024-12-14 RX ORDER — SODIUM CHLORIDE 0.9 % (FLUSH) 0.9 %
10 SYRINGE (ML) INJECTION AS NEEDED
Status: DISCONTINUED | OUTPATIENT
Start: 2024-12-14 | End: 2024-12-15 | Stop reason: HOSPADM

## 2024-12-14 RX ORDER — THIAMINE HYDROCHLORIDE 100 MG/ML
200 INJECTION, SOLUTION INTRAMUSCULAR; INTRAVENOUS DAILY
Status: DISCONTINUED | OUTPATIENT
Start: 2024-12-14 | End: 2024-12-15 | Stop reason: HOSPADM

## 2024-12-14 RX ORDER — METOPROLOL TARTRATE 1 MG/ML
INJECTION, SOLUTION INTRAVENOUS AS NEEDED
Status: DISCONTINUED | OUTPATIENT
Start: 2024-12-14 | End: 2024-12-14 | Stop reason: SURG

## 2024-12-14 RX ORDER — BISACODYL 5 MG/1
5 TABLET, DELAYED RELEASE ORAL DAILY PRN
Status: CANCELLED | OUTPATIENT
Start: 2024-12-14

## 2024-12-14 RX ORDER — BISACODYL 10 MG
10 SUPPOSITORY, RECTAL RECTAL DAILY PRN
Status: CANCELLED | OUTPATIENT
Start: 2024-12-14

## 2024-12-14 RX ORDER — ONDANSETRON 2 MG/ML
INJECTION INTRAMUSCULAR; INTRAVENOUS AS NEEDED
Status: DISCONTINUED | OUTPATIENT
Start: 2024-12-14 | End: 2024-12-14 | Stop reason: SURG

## 2024-12-14 RX ORDER — PANTOPRAZOLE SODIUM 40 MG/10ML
80 INJECTION, POWDER, LYOPHILIZED, FOR SOLUTION INTRAVENOUS ONCE
Status: COMPLETED | OUTPATIENT
Start: 2024-12-14 | End: 2024-12-14

## 2024-12-14 RX ORDER — LISINOPRIL 20 MG/1
20 TABLET ORAL DAILY
Status: DISCONTINUED | OUTPATIENT
Start: 2024-12-14 | End: 2024-12-15 | Stop reason: HOSPADM

## 2024-12-14 RX ORDER — PANTOPRAZOLE SODIUM 40 MG/10ML
40 INJECTION, POWDER, LYOPHILIZED, FOR SOLUTION INTRAVENOUS EVERY 12 HOURS SCHEDULED
Status: DISCONTINUED | OUTPATIENT
Start: 2024-12-14 | End: 2024-12-15 | Stop reason: HOSPADM

## 2024-12-14 RX ORDER — SODIUM CHLORIDE 9 MG/ML
40 INJECTION, SOLUTION INTRAVENOUS AS NEEDED
Status: DISCONTINUED | OUTPATIENT
Start: 2024-12-14 | End: 2024-12-15 | Stop reason: HOSPADM

## 2024-12-14 RX ORDER — ONDANSETRON 2 MG/ML
4 INJECTION INTRAMUSCULAR; INTRAVENOUS EVERY 6 HOURS PRN
Status: DISCONTINUED | OUTPATIENT
Start: 2024-12-14 | End: 2024-12-15 | Stop reason: HOSPADM

## 2024-12-14 RX ORDER — POLYETHYLENE GLYCOL 3350 17 G/17G
17 POWDER, FOR SOLUTION ORAL DAILY PRN
Status: DISCONTINUED | OUTPATIENT
Start: 2024-12-14 | End: 2024-12-15 | Stop reason: HOSPADM

## 2024-12-14 RX ORDER — ONDANSETRON 2 MG/ML
4 INJECTION INTRAMUSCULAR; INTRAVENOUS ONCE AS NEEDED
Status: DISCONTINUED | OUTPATIENT
Start: 2024-12-14 | End: 2024-12-14

## 2024-12-14 RX ORDER — PROPOFOL 10 MG/ML
VIAL (ML) INTRAVENOUS AS NEEDED
Status: DISCONTINUED | OUTPATIENT
Start: 2024-12-14 | End: 2024-12-14 | Stop reason: SURG

## 2024-12-14 RX ORDER — AMOXICILLIN 250 MG
2 CAPSULE ORAL 2 TIMES DAILY PRN
Status: DISCONTINUED | OUTPATIENT
Start: 2024-12-14 | End: 2024-12-15 | Stop reason: HOSPADM

## 2024-12-14 RX ORDER — LIDOCAINE HYDROCHLORIDE 20 MG/ML
INJECTION, SOLUTION EPIDURAL; INFILTRATION; INTRACAUDAL; PERINEURAL AS NEEDED
Status: DISCONTINUED | OUTPATIENT
Start: 2024-12-14 | End: 2024-12-14 | Stop reason: SURG

## 2024-12-14 RX ORDER — ROCURONIUM BROMIDE 10 MG/ML
INJECTION, SOLUTION INTRAVENOUS AS NEEDED
Status: DISCONTINUED | OUTPATIENT
Start: 2024-12-14 | End: 2024-12-14 | Stop reason: SURG

## 2024-12-14 RX ORDER — SODIUM CHLORIDE 0.9 % (FLUSH) 0.9 %
10 SYRINGE (ML) INJECTION EVERY 12 HOURS SCHEDULED
Status: CANCELLED | OUTPATIENT
Start: 2024-12-14

## 2024-12-14 RX ORDER — PROMETHAZINE HYDROCHLORIDE 25 MG/1
25 SUPPOSITORY RECTAL ONCE AS NEEDED
Status: DISCONTINUED | OUTPATIENT
Start: 2024-12-14 | End: 2024-12-14

## 2024-12-14 RX ORDER — SODIUM CHLORIDE 9 MG/ML
40 INJECTION, SOLUTION INTRAVENOUS AS NEEDED
Status: CANCELLED | OUTPATIENT
Start: 2024-12-14

## 2024-12-14 RX ORDER — DEXMEDETOMIDINE HYDROCHLORIDE 100 UG/ML
INJECTION, SOLUTION INTRAVENOUS AS NEEDED
Status: DISCONTINUED | OUTPATIENT
Start: 2024-12-14 | End: 2024-12-14 | Stop reason: SURG

## 2024-12-14 RX ORDER — MEPERIDINE HYDROCHLORIDE 25 MG/ML
12.5 INJECTION INTRAMUSCULAR; INTRAVENOUS; SUBCUTANEOUS
Status: DISCONTINUED | OUTPATIENT
Start: 2024-12-14 | End: 2024-12-14

## 2024-12-14 RX ADMIN — FENTANYL CITRATE 100 MCG: 50 INJECTION, SOLUTION INTRAMUSCULAR; INTRAVENOUS at 12:56

## 2024-12-14 RX ADMIN — METOPROLOL SUCCINATE 50 MG: 50 TABLET, EXTENDED RELEASE ORAL at 15:34

## 2024-12-14 RX ADMIN — ONDANSETRON 4 MG: 2 INJECTION INTRAMUSCULAR; INTRAVENOUS at 12:47

## 2024-12-14 RX ADMIN — MIDAZOLAM HYDROCHLORIDE 2 MG: 1 INJECTION, SOLUTION INTRAMUSCULAR; INTRAVENOUS at 12:39

## 2024-12-14 RX ADMIN — DEXAMETHASONE SODIUM PHOSPHATE 4 MG: 4 INJECTION, SOLUTION INTRAMUSCULAR; INTRAVENOUS at 13:06

## 2024-12-14 RX ADMIN — SODIUM CHLORIDE 100 ML/HR: 9 INJECTION, SOLUTION INTRAVENOUS at 05:09

## 2024-12-14 RX ADMIN — METOPROLOL TARTRATE 2 MG: 1 INJECTION, SOLUTION INTRAVENOUS at 13:58

## 2024-12-14 RX ADMIN — ONDANSETRON 4 MG: 2 INJECTION INTRAMUSCULAR; INTRAVENOUS at 03:29

## 2024-12-14 RX ADMIN — DEXMEDETOMIDINE HYDROCHLORIDE 10 MCG: 100 INJECTION, SOLUTION, CONCENTRATE INTRAVENOUS at 12:42

## 2024-12-14 RX ADMIN — DEXMEDETOMIDINE HYDROCHLORIDE 10 MCG: 100 INJECTION, SOLUTION, CONCENTRATE INTRAVENOUS at 12:47

## 2024-12-14 RX ADMIN — SUGAMMADEX 200 MG: 100 INJECTION, SOLUTION INTRAVENOUS at 13:37

## 2024-12-14 RX ADMIN — METOPROLOL TARTRATE 3 MG: 1 INJECTION, SOLUTION INTRAVENOUS at 13:57

## 2024-12-14 RX ADMIN — PROPOFOL 200 MG: 10 INJECTION, EMULSION INTRAVENOUS at 12:59

## 2024-12-14 RX ADMIN — SODIUM CHLORIDE 100 ML/HR: 9 INJECTION, SOLUTION INTRAVENOUS at 15:16

## 2024-12-14 RX ADMIN — THIAMINE HYDROCHLORIDE 200 MG: 100 INJECTION, SOLUTION INTRAMUSCULAR; INTRAVENOUS at 05:09

## 2024-12-14 RX ADMIN — Medication 10 ML: at 09:08

## 2024-12-14 RX ADMIN — PANTOPRAZOLE SODIUM 80 MG: 40 INJECTION, POWDER, FOR SOLUTION INTRAVENOUS at 03:29

## 2024-12-14 RX ADMIN — PANTOPRAZOLE SODIUM 40 MG: 40 INJECTION, POWDER, FOR SOLUTION INTRAVENOUS at 21:57

## 2024-12-14 RX ADMIN — LISINOPRIL 20 MG: 20 TABLET ORAL at 15:24

## 2024-12-14 RX ADMIN — SODIUM CHLORIDE 1000 ML: 9 INJECTION, SOLUTION INTRAVENOUS at 03:29

## 2024-12-14 RX ADMIN — FOLIC ACID 1 MG: 5 INJECTION, SOLUTION INTRAMUSCULAR; INTRAVENOUS; SUBCUTANEOUS at 09:08

## 2024-12-14 RX ADMIN — Medication 10 ML: at 21:57

## 2024-12-14 RX ADMIN — ESMOLOL HYDROCHLORIDE 20 MG: 100 INJECTION, SOLUTION INTRAVENOUS at 13:47

## 2024-12-14 RX ADMIN — MIDAZOLAM HYDROCHLORIDE 2 MG: 1 INJECTION, SOLUTION INTRAMUSCULAR; INTRAVENOUS at 12:47

## 2024-12-14 RX ADMIN — METOPROLOL TARTRATE 5 MG: 1 INJECTION, SOLUTION INTRAVENOUS at 22:26

## 2024-12-14 RX ADMIN — IOPAMIDOL 100 ML: 755 INJECTION, SOLUTION INTRAVENOUS at 02:13

## 2024-12-14 RX ADMIN — LIDOCAINE HYDROCHLORIDE 100 MG: 20 INJECTION, SOLUTION INTRAVENOUS at 12:57

## 2024-12-14 RX ADMIN — DIAZEPAM 5 MG: 5 INJECTION INTRAMUSCULAR; INTRAVENOUS at 15:55

## 2024-12-14 RX ADMIN — PANTOPRAZOLE SODIUM 40 MG: 40 INJECTION, POWDER, FOR SOLUTION INTRAVENOUS at 08:08

## 2024-12-14 RX ADMIN — ONDANSETRON 4 MG: 2 INJECTION INTRAMUSCULAR; INTRAVENOUS at 01:45

## 2024-12-14 RX ADMIN — MAGNESIUM SULFATE HEPTAHYDRATE 2 G: 500 INJECTION, SOLUTION INTRAMUSCULAR; INTRAVENOUS at 12:44

## 2024-12-14 RX ADMIN — ROCURONIUM BROMIDE 50 MG: 50 INJECTION INTRAVENOUS at 12:58

## 2024-12-14 NOTE — H&P
[facility]   HISTORY AND PHYSICAL    Patient Name: Ahsan Moran  : 1980  MRN: [id number]  Primary Care Physician:  Provider, No Known  Date of admission: 2024    Subjective   Subjective     Chief Complaint: hematemesis     History of Present Illness  Patient is a 44 y.o. year old male PMHX of HTN who presents to the emergency department for evaluation of hematemesis. Patient states that he awoke with severe nausea went to the bathroom and threw up.  He states that he began to throw up 3 more times all of which were large volume bright red blood.  He has pain in his left upper quadrant abdomen and continues to have severe nausea but no additional episodes of emesis.  He reports a normal bowel movement earlier in the day he denies any melena or hematochezia.  He takes a baby aspirin daily.  He reports that he has 8-10 beers several times a week.  He denies any fevers, chills, diaphoresis, chest pain, shortness of breath, constipation or diarrhea.  In the ED he was given 1 L bolus of normal saline, Protonix, and Zofran.  He was admitted for further evaluation and management.        Review of Systems   Constitutional:  Negative for activity change, appetite change, chills, diaphoresis and fatigue.   HENT:  Negative for drooling, facial swelling, sneezing and sore throat.    Respiratory:  Negative for cough and shortness of breath.    Cardiovascular:  Negative for chest pain.   Gastrointestinal:  Positive for nausea and vomiting. Negative for abdominal pain, constipation and diarrhea.   Endocrine: Negative for polydipsia and polyphagia.   Musculoskeletal:  Negative for joint swelling.   Neurological:  Negative for dizziness, weakness, numbness and headaches.   Psychiatric/Behavioral:  Negative for agitation.         Personal History     Past Medical History:   Diagnosis Date    A-fib     Head injury     Headache, tension-type     Hypertension     Sleep apnea     Stroke     TIA (transient  ischemic attack)        No past surgical history on file.    Family History: family history is not on file. Otherwise pertinent FHx was reviewed and not pertinent to current issue.    Social History:  reports that he has never smoked. He has been exposed to tobacco smoke. His smokeless tobacco use includes snuff. He reports current alcohol use of about 40.0 standard drinks of alcohol per week. He reports that he does not currently use drugs.    Home Medications:  Metoprolol Succinate, aspirin, cholecalciferol, lisinopril, multivitamin, and omeprazole    Allergies:  No Known Allergies    Objective    Objective     Vitals:   Temp:  [98 °F (36.7 °C)] 98 °F (36.7 °C)  Heart Rate:  [72-85] 72  Resp:  [13-14] 13  BP: (145-153)/(86-95) 145/86    Physical Exam  Constitutional:       General: He is not in acute distress.     Appearance: He is not ill-appearing.   HENT:      Head: Normocephalic and atraumatic.      Nose: No rhinorrhea.      Mouth/Throat:      Mouth: Mucous membranes are moist.      Pharynx: Oropharynx is clear.   Eyes:      Extraocular Movements: Extraocular movements intact.      Pupils: Pupils are equal, round, and reactive to light.   Cardiovascular:      Rate and Rhythm: Normal rate and regular rhythm.      Heart sounds: No murmur heard.  Pulmonary:      Effort: Pulmonary effort is normal. No respiratory distress.      Breath sounds: Normal breath sounds. No wheezing.   Chest:      Chest wall: No tenderness.   Abdominal:      General: Abdomen is flat. Bowel sounds are normal. There is no distension.      Palpations: Abdomen is soft.      Tenderness: There is no abdominal tenderness. There is no guarding.   Musculoskeletal:         General: No swelling or tenderness.      Cervical back: Normal range of motion.   Skin:     General: Skin is warm and dry.   Neurological:      General: No focal deficit present.      Mental Status: He is alert and oriented to person, place, and time.   Psychiatric:         Mood  and Affect: Mood normal.       Result Review    Result Review:  I have personally reviewed the results from the time of this admission to 12/14/2024 04:16 EST and agree with these findings:  []  Laboratory list / accordion  []  Microbiology  []  Radiology  []  EKG/Telemetry   []  Cardiology/Vascular   []  Pathology  []  Old records  []  Other:  Most notable findings include:       Assessment & Plan   Assessment / Plan     Brief Patient Summary:  Ahsan Moran is a 44 y.o. male who presented to the ED with complaints of hematemesis.     Active Hospital Problems:  Active Hospital Problems    Diagnosis     **Hematemesis      Plan:   GI bleed:  Hgb 14.8  Admitted to telemetry  Telemetry monitoring  N.p.o. for now  Continue with IV fluids and normal saline at 100 mL/h  Continue with IV protonix   Serial H&H  Consulted GI, recommendations welcome  Daily CBC and CMP, will continue to monitor  Will continue to monitor vitals    Etoh Abuse:  Etoh level   Ordered CIWA protocol   Ordered Thiamine and folic acid   Q4 Neurochecks   Daily CMP, will continue to monitor       Hx of HTN:   Will continue home medications    DVT prophylaxis: Lovenox  GI prophylaxis: Zofran, Protonix  CODE STATUS: CPR (full code)        VTE Prophylaxis:  Pharmacologic VTE prophylaxis orders are present.        CODE STATUS:    Level Of Support Discussed With: Patient  Code Status (Patient has no pulse and is not breathing): CPR (Attempt to Resuscitate)  Medical Interventions (Patient has pulse or is breathing): Full Support    Admission Status:  I believe this patient meets FULL CODE status.    Kristina Miller MD

## 2024-12-14 NOTE — ANESTHESIA PREPROCEDURE EVALUATION
Anesthesia Evaluation     Patient summary reviewed and Nursing notes reviewed                Airway   Mallampati: I  TM distance: >3 FB  Neck ROM: full  No difficulty expected  Dental      Pulmonary - normal exam    breath sounds clear to auscultation  (+) ,sleep apnea  Cardiovascular - normal exam    Rhythm: regular  Rate: normal    (+) hypertension, dysrhythmias Paroxysmal Atrial Fib      Neuro/Psych  (+) seizures, TIA, CVA, headaches, psychiatric history PTSD  GI/Hepatic/Renal/Endo    (+) obesity, GI bleeding     Musculoskeletal (-) negative ROS    Abdominal    Substance History - negative use     OB/GYN negative ob/gyn ROS         Other        ROS/Med Hx Other: History of heavy alcohol abuse.  He drinks 6-12 beers per day for 5 days a week since he was in his teens.    ECG - 26-Feb-2024 2:48:40  Sinus rhythm  Ventricular premature complex  When compared with ECG of ,  No significant change.    ECHO 2/26/24  ·  Calculated left ventricular EF = 56.2%  ·  No obvious wall motion abnormalities  ·  No obvious valvular abnormalities  ·  Negative bubble study for R-->L shunt                      Anesthesia Plan    ASA 3     general     (12/14/24 01:34  Sodium: 139  Potassium: 3.9  Chloride: 101  CO2: 22.4  Anion Gap: 15.6 (H)  BUN: 10  Creatinine: 0.64 (L)  BUN/Creatinine Ratio: 15.6  eGFR: 119.0  Glucose: 110 (H)  Calcium: 8.7  Alkaline Phosphatase: 90  Total Protein: 7.3  Albumin: 4.1  Globulin: 3.2  A/G Ratio: 1.3  AST (SGOT): 39  ALT (SGPT): 17  Total Bilirubin: 0.4  Lipase: 24  WBC: 4.04  RBC: 4.47  Hemoglobin: 14.8  Hematocrit: 41.5  Platelets: 164)  intravenous induction     Anesthetic plan, risks, benefits, and alternatives have been provided, discussed and informed consent has been obtained with: patient.        CODE STATUS:    Level Of Support Discussed With: Patient  Code Status (Patient has no pulse and is not breathing): CPR (Attempt to Resuscitate)  Medical Interventions (Patient has pulse or is  breathing): Full Support

## 2024-12-14 NOTE — LETTER
December 15, 2024     Patient: Ahsan Moran   YOB: 1980   Date of Visit: 12/14/2024       To Whom It May Concern:    It is my medical opinion that Ahsan Moran may return to work on December 23, 2024 .       Thank you.

## 2024-12-14 NOTE — CONSULTS
[facility]  Initial Inpatient Consult Note    Referring Provider: Garrison Paulino MD    Reason for Consultation: Hematemesis    History of present illness:  Patient is 44 y.o. pleasant male with known history of obesity, sleep apnea, TIA, hypertension, atrial fibrillation and history of heavy alcohol abuse admitted overnight with complaint of hematemesis x 3-4 episodes at home.    Patient describes that he ate argelia ball around 5:30 PM and went for sleep.  Around 9:30 PM he woke up from sleep due to having nausea and immediately throw up the first vomit was bilious.  This was followed by 3 more episodes of vomiting that have large amount of bright red blood.  He has a history of epistaxis that mostly occurs in the winter and believes that blood was ingested epistaxis.  Patient known to have history of heavy alcohol abuse.  He drinks 6-12 beers per day for 5 days a week since he was in his teens.  Patient denies NSAIDs intake, blood thinner or anticoagulant medications except baby aspirin daily.  He is feeling better and does not have nausea or vomiting episode since his arrival in the ER.  Patient is currently on Protonix 40 mg IV twice daily.  He is n.p.o. for EGD    Patient also mentions that for last several months he is having left upper quadrant dull to sharp abdominal pain that comes and goes.  No correlation with food or activity.    Lab workup in the ED reported hemoglobin of 14.8 g/dL from the baseline value of around 14 g/dL.  He is FOBT positive.  Kidney functions, electrolytes, LFTs are within normal range.      Past Surgical History:  Past Surgical History:   Procedure Laterality Date    WISDOM TOOTH EXTRACTION        Social History:   Social History     Tobacco Use    Smoking status: Never     Passive exposure: Past    Smokeless tobacco: Current     Types: Snuff   Substance Use Topics    Alcohol use: Yes     Alcohol/week: 40.0 standard drinks of alcohol     Types: 40 Cans  of beer per week      Family History:  History reviewed. No pertinent family history.  Home Meds:  Medications Prior to Admission   Medication Sig Dispense Refill Last Dose/Taking    aspirin (Aspirin Childrens) 81 MG chewable tablet 1 p.o. daily 90 tablet 3     Cholecalciferol 25 MCG (1000 UT) tablet Take 2 tablets by mouth Daily.       lisinopril (PRINIVIL,ZESTRIL) 20 MG tablet Take 1 tablet by mouth Daily.       Metoprolol Succinate 50 MG capsule extended-release 24 hour sprinkle Take 1 tablet by mouth Daily.       Multi Vitamin tablet tablet Take 1 tablet by mouth Daily.       omeprazole (priLOSEC) 20 MG capsule Take 1 capsule by mouth Daily.        Current Meds:   folic acid 1 mg in sodium chloride 0.9 % 50 mL IVPB, 1 mg, Intravenous, Daily  pantoprazole, 40 mg, Intravenous, Q12H  sodium chloride, 10 mL, Intravenous, Q12H  thiamine (B-1) IV, 200 mg, Intravenous, Daily   Followed by  [START ON 12/19/2024] thiamine, 100 mg, Oral, Daily      Allergies:  No Known Allergies    Objective     Vital Signs  Temp:  [98 °F (36.7 °C)-98.1 °F (36.7 °C)] 98.1 °F (36.7 °C)  Heart Rate:  [70-85] 70  Resp:  [13-16] 16  BP: (138-158)/() 158/100  Physical Exam:  General Appearance:    Alert and oriented, normal affect   Head:    Normocephalic, without obvious abnormality, atraumatic   Eyes:          conjunctivae and sclerae normal, no icterus, pupils round and reactive to light   Oral cavity: Moist and intact, normal dentation   Neck:   Supple, no adenopathy   Chest Wall/Lungs:    No abnormalities observed, equal on expansion bilaterally, no use of extrarespiratory muscles noted   Abdomen:     Soft, nondistended, nontender; normal bowel sounds, no hepatospleenomegaly, no ascites   Extremities:   no edema, clubbing, or cyanosis   Skin:   No bruising or rash   Psychiatric:  normal mood and insight     Results Review:   I reviewed the patient's new clinical results.    Results from last 7 days   Lab Units 12/14/24  0134   WBC  10*3/mm3 4.04   HEMOGLOBIN g/dL 14.8   MCV fL 92.8   PLATELETS 10*3/mm3 164         Lab 12/14/24  0134   NEUTROS ABS 2.20     Results from last 7 days   Lab Units 12/14/24  0134   BUN mg/dL 10   CREATININE mg/dL 0.64*   SODIUM mmol/L 139   POTASSIUM mmol/L 3.9   CHLORIDE mmol/L 101   CO2 mmol/L 22.4   GLUCOSE mg/dL 110*          Results from last 7 days   Lab Units 12/14/24  0134   AST (SGOT) U/L 39   ALT (SGPT) U/L 17   ALK PHOS U/L 90   BILIRUBIN mg/dL 0.4   TOTAL PROTEIN g/dL 7.3   ALBUMIN g/dL 4.1                Radiology:  CT Abdomen Pelvis With Contrast    Result Date: 12/14/2024  1.  There is suspected acute to subacute pancreatitis, especially involving the head of the pancreas, with secondary duodenitis and possible gastritis. Consider close interval clinical, lab, and imaging follow-up to ensure a benign progression and to exclude a complication or underlying malignant process. No definite pancreatic necrosis is seen. No peripancreatic pseudocyst is suspected. No biliary ductal dilatation is identified. 2.  Otherwise, no acute findings are seen in the abdomen or pelvis by enhanced CT examination. 3.  Please see above comments for further detail.    Portions of this note were completed with a voice recognition program.  Electronically Signed By-Jeremias Case MD On:12/14/2024 2:28 AM       Impression:     Hematemesis     Impression:    Hematemesis  Nausea and vomiting  Left upper quadrant abdominal pain  Heavy alcohol abuse    Plan and Recommendations: Patient with history of heavy alcohol abuse had sudden onset episodes of nausea with hematemesis x 3 episodes at home.  He had history of epistaxis and believes that hematemesis occurs due to postnasal bleed with swallowing the blood. Hemoglobin is stable.  He is on Protonix 40 mg IV twice daily.  Patient is currently n.p.o. and will proceed with EGD for further evaluation    I discussed the patient's findings and my recommendations with patient, nursing  staff, and consulting provider.      Electronically signed by Javier Ramirez MD, 12/14/24, 8:02 AM EST.

## 2024-12-14 NOTE — ED PROVIDER NOTES
Time: 1:34 AM EST  Date of encounter:  12/14/2024  Independent Historian/Clinical History and Information was obtained by:   Patient    History is limited by: N/A    Chief Complaint: Hematemesis      History of Present Illness:  Patient is a 44 y.o. year old male who presents to the emergency department for evaluation of hematemesis    Patient states that he awoke with severe nausea went to the bathroom and threw up.  He states that he began to throw up 3 more times all of which were large volume bright red blood.  He has pain in his left upper quadrant abdomen and continues to have severe nausea but no additional episodes of emesis.  He reports a normal bowel movement earlier in the day he denies any melena or hematochezia.  He denies lightheadedness or dizziness.  He takes a baby aspirin daily.  He reports that he has 8-10 beers several times a week.  He denies any previous history of liver disease.      Patient Care Team  Primary Care Provider: Provider, No Known    Past Medical History:     No Known Allergies  Past Medical History:   Diagnosis Date    A-fib     Head injury 1998    Headache, tension-type     Hypertension     PTSD (post-traumatic stress disorder)     Sleep apnea     Stroke     TIA (transient ischemic attack)     Tinnitus      Past Surgical History:   Procedure Laterality Date    WISDOM TOOTH EXTRACTION       History reviewed. No pertinent family history.    Home Medications:  Prior to Admission medications    Medication Sig Start Date End Date Taking? Authorizing Provider   aspirin (Aspirin Childrens) 81 MG chewable tablet 1 p.o. daily 6/3/24   Devin Posadas MD   Cholecalciferol 25 MCG (1000 UT) tablet Take 2 tablets by mouth Daily.    Wilfred Benoit MD   lisinopril (PRINIVIL,ZESTRIL) 20 MG tablet Take 1 tablet by mouth Daily.    Wilfred Benoit MD   Metoprolol Succinate 50 MG capsule extended-release 24 hour sprinkle Take 1 tablet by mouth Daily.    Wilfred Benoit  "MD   Multi Vitamin tablet tablet Take 1 tablet by mouth Daily.    Provider, Wilfred, MD   omeprazole (priLOSEC) 20 MG capsule Take 1 capsule by mouth Daily.    Provider, Historical, MD        Social History:   Social History     Tobacco Use    Smoking status: Never     Passive exposure: Past    Smokeless tobacco: Current     Types: Snuff   Vaping Use    Vaping status: Never Used   Substance Use Topics    Alcohol use: Yes     Alcohol/week: 40.0 standard drinks of alcohol     Types: 40 Cans of beer per week    Drug use: Not Currently         Review of Systems:  Review of Systems   Constitutional:  Negative for chills and fever.   HENT:  Negative for congestion, ear pain and sore throat.    Eyes:  Negative for pain.   Respiratory:  Negative for cough, chest tightness and shortness of breath.    Cardiovascular:  Negative for chest pain.   Gastrointestinal:  Positive for abdominal pain, nausea and vomiting. Negative for diarrhea.   Genitourinary:  Negative for flank pain and hematuria.   Musculoskeletal:  Negative for joint swelling.   Skin:  Negative for pallor.   Neurological:  Negative for seizures and headaches.   All other systems reviewed and are negative.       Physical Exam:  /94 (BP Location: Left arm, Patient Position: Lying)   Pulse 70   Temp 98.1 °F (36.7 °C) (Oral)   Resp 15   Ht 182.9 cm (72\")   Wt 120 kg (264 lb 8.8 oz)   SpO2 97%   BMI 35.88 kg/m²     Physical Exam  Vitals and nursing note reviewed.   Constitutional:       General: He is not in acute distress.     Appearance: Normal appearance. He is not toxic-appearing.   HENT:      Head: Normocephalic and atraumatic.      Jaw: There is normal jaw occlusion.      Mouth/Throat:      Comments: Dried blood in mouth  Eyes:      General: Lids are normal.      Extraocular Movements: Extraocular movements intact.      Conjunctiva/sclera: Conjunctivae normal.      Pupils: Pupils are equal, round, and reactive to light.   Cardiovascular:      Rate " and Rhythm: Normal rate and regular rhythm.      Pulses: Normal pulses.      Heart sounds: Normal heart sounds.   Pulmonary:      Effort: Pulmonary effort is normal. No respiratory distress.      Breath sounds: Normal breath sounds. No wheezing or rhonchi.   Abdominal:      General: Abdomen is flat.      Palpations: Abdomen is soft.      Tenderness: There is abdominal tenderness. There is no guarding or rebound.      Comments: Left upper quadrant tenderness   Musculoskeletal:         General: Normal range of motion.      Cervical back: Normal range of motion and neck supple.      Right lower leg: No edema.      Left lower leg: No edema.   Skin:     General: Skin is warm and dry.   Neurological:      Mental Status: He is alert and oriented to person, place, and time. Mental status is at baseline.   Psychiatric:         Mood and Affect: Mood normal.              Medical Decision Making:      Comorbidities that affect care:    Atrial fibrillation, aspirin therapy, history of TIA, hypertension, history of alcohol use    External Notes reviewed:    Previous Clinic Note: Outpatient neurology visit for symptomatic epilepsy 6/3/2024      The following orders were placed and all results were independently analyzed by me:  Orders Placed This Encounter   Procedures    CT Abdomen Pelvis With Contrast    [facility] Draw    Comprehensive Metabolic Panel    Occult Blood, Fecal By Immunoassay - Stool, Per Rectum    Lipase    Urinalysis With Microscopic If Indicated (No Culture) - Urine, Clean Catch    CBC Auto Differential    Comprehensive Metabolic Panel    NPO Diet NPO Type: Strict NPO    Undress & Gown    Measure Blood Pressure    Vital Signs    Orthostatic Blood Pressure    Vital Signs    Activity - Ad Kenia    Intake & Output    Weigh Patient    Oral Care    Saline Lock & Maintain IV Access    Vital Signs    Continuous Pulse Oximetry    Obtain Baseline Clinical [place] Withdrawal Assessment - Ar (CIWA-Ar), Sedation Scale & Vital  Signs    Clinical [place] Withdrawal Assessment (CIWA-Ar)    If CIWA-Ar Score Less Than 8 For 3 Consecutive Assessments, Monitor Every 4 Hours & Discontinue Assessment When CIWA-Ar Less Than 8 for 24 Hours    Obtain Pre & Post Sedation Scores With Every Sedative Dose - Hold For POSS Greater Than 2 or RASS of -3 or Less    Notify Provider - Withdrawal    Notify Provider of Abnormal Lab Results    Notify Provider - Vitals    Code Status and Medical Interventions: CPR (Attempt to Resuscitate); Full Support    Hospitalist (on-call MD unless specified)    Inpatient Gastroenterology Consult    Pulse Oximetry    Oxygen Therapy- Nasal Cannula; Titrate 1-6 LPM Per SpO2; 90 - 95%    Type & Screen    ABO RH Specimen Verification    Insert Peripheral IV    Insert Peripheral IV    Inpatient Admission    Seizure Precautions    Safety Precautions    CBC & Differential    Green Top (Gel)    Lavender Top    Gold Top - SST    Light Blue Top    CBC & Differential       Medications Given in the Emergency Department:  Medications   sodium chloride 0.9 % flush 10 mL (has no administration in time range)   Enoxaparin Sodium (LOVENOX) syringe 40 mg (has no administration in time range)   sodium chloride 0.9 % flush 10 mL (has no administration in time range)   sodium chloride 0.9 % flush 10 mL (has no administration in time range)   sodium chloride 0.9 % infusion 40 mL (has no administration in time range)   sennosides-docusate (PERICOLACE) 8.6-50 MG per tablet 2 tablet (has no administration in time range)     And   polyethylene glycol (MIRALAX) packet 17 g (has no administration in time range)     And   bisacodyl (DULCOLAX) EC tablet 5 mg (has no administration in time range)     And   bisacodyl (DULCOLAX) suppository 10 mg (has no administration in time range)   Potassium Replacement - Follow Nurse / BPA Driven Protocol (has no administration in time range)   Magnesium Standard Dose Replacement - Follow Nurse / BPA Driven Protocol  (has no administration in time range)   Calcium Replacement - Follow Nurse / BPA Driven Protocol (has no administration in time range)   ondansetron (ZOFRAN) injection 4 mg (has no administration in time range)   melatonin tablet 2.5 mg (has no administration in time range)   sodium chloride 0.9 % infusion (100 mL/hr Intravenous New Bag 12/14/24 0509)   thiamine (B-1) injection 200 mg (200 mg Intravenous Given 12/14/24 0509)     Followed by   thiamine (VITAMIN B-1) tablet 100 mg (has no administration in time range)   folic acid 1 mg in sodium chloride 0.9 % 50 mL IVPB (has no administration in time range)   ondansetron (ZOFRAN) injection 4 mg (4 mg Intravenous Given 12/14/24 0145)   pantoprazole (PROTONIX) injection 80 mg (80 mg Intravenous Given 12/14/24 0329)   ondansetron (ZOFRAN) injection 4 mg (4 mg Intravenous Given 12/14/24 0329)   sodium chloride 0.9 % bolus 1,000 mL (0 mL Intravenous Stopped 12/14/24 0504)   iopamidol (ISOVUE-370) 76 % injection 100 mL (100 mL Intravenous Given 12/14/24 0213)        ED Course:    ED Course as of 12/14/24 0629   Sat Dec 14, 2024   0345 Discussed patient's workup and presentation with the hospitalist who agrees to admission.  The patient's airway is intact, vital signs, and respiratory status are safe for admission at this time.   [JS]      ED Course User Index  [JS] Devin Evans MD       Labs:    Lab Results (last 24 hours)       Procedure Component Value Units Date/Time    CBC & Differential [[id number]]  (Abnormal) Collected: 12/14/24 0134    Specimen: Blood Updated: 12/14/24 0144    Narrative:      The following orders were created for panel order CBC & Differential.  Procedure                               Abnormality         Status                     ---------                               -----------         ------                     CBC Auto Differential[[id number]]        Abnormal            Final result                 Please view results for these tests on the  individual orders.    Comprehensive Metabolic Panel [[id number]]  (Abnormal) Collected: 12/14/24 0134    Specimen: Blood Updated: 12/14/24 0209     Glucose 110 mg/dL      BUN 10 mg/dL      Creatinine 0.64 mg/dL      Sodium 139 mmol/L      Potassium 3.9 mmol/L      Comment: Slight hemolysis detected by analyzer. Result may be falsely elevated.        Chloride 101 mmol/L      CO2 22.4 mmol/L      Calcium 8.7 mg/dL      Total Protein 7.3 g/dL      Albumin 4.1 g/dL      ALT (SGPT) 17 U/L      AST (SGOT) 39 U/L      Alkaline Phosphatase 90 U/L      Total Bilirubin 0.4 mg/dL      Globulin 3.2 gm/dL      A/G Ratio 1.3 g/dL      BUN/Creatinine Ratio 15.6     Anion Gap 15.6 mmol/L      eGFR 119.0 mL/min/1.73     Narrative:      GFR Categories in Chronic Kidney Disease (CKD)      GFR Category          GFR (mL/min/1.73)    Interpretation  G1                     90 or greater         Normal or high (1)  G2                      60-89                Mild decrease (1)  G3a                   45-59                Mild to moderate decrease  G3b                   30-44                Moderate to severe decrease  G4                    15-29                Severe decrease  G5                    14 or less           Kidney failure          (1)In the absence of evidence of kidney disease, neither GFR category G1 or G2 fulfill the criteria for CKD.    eGFR calculation 2021 CKD-EPI creatinine equation, which does not include race as a factor    Lipase [[id number]]  (Normal) Collected: 12/14/24 0134    Specimen: Blood Updated: 12/14/24 0209     Lipase 24 U/L     CBC Auto Differential [[id number]]  (Abnormal) Collected: 12/14/24 0134    Specimen: Blood Updated: 12/14/24 0144     WBC 4.04 10*3/mm3      RBC 4.47 10*6/mm3      Hemoglobin 14.8 g/dL      Hematocrit 41.5 %      MCV 92.8 fL      MCH 33.1 pg      MCHC 35.7 g/dL      RDW 12.2 %      RDW-SD 41.7 fl      MPV 9.1 fL      Platelets 164 10*3/mm3      Neutrophil % 54.5 %      Lymphocyte % 34.2  %      Monocyte % 9.4 %      Eosinophil % 0.7 %      Basophil % 0.7 %      Immature Grans % 0.5 %      Neutrophils, Absolute 2.20 10*3/mm3      Lymphocytes, Absolute 1.38 10*3/mm3      Monocytes, Absolute 0.38 10*3/mm3      Eosinophils, Absolute 0.03 10*3/mm3      Basophils, Absolute 0.03 10*3/mm3      Immature Grans, Absolute 0.02 10*3/mm3      nRBC 0.0 /100 WBC     Occult Blood, Fecal By Immunoassay - Stool, Per Rectum [[id number]]  (Abnormal) Collected: 12/14/24 0141    Specimen: Stool from Per Rectum Updated: 12/14/24 0200     Occult Blood, Fecal by Immunoassay Positive    Urinalysis With Microscopic If Indicated (No Culture) - Urine, Clean Catch [[id number]]  (Abnormal) Collected: 12/14/24 0510    Specimen: Urine, Clean Catch Updated: 12/14/24 0518     Color, UA Yellow     Appearance, UA Clear     pH, UA 8.0     Specific Gravity, UA >1.030     Glucose, UA Negative     Ketones, UA Negative     Bilirubin, UA Negative     Blood, UA Negative     Protein, UA Trace     Leuk Esterase, UA Negative     Nitrite, UA Negative     Urobilinogen, UA 1.0 E.U./dL    Narrative:      Urine microscopic not indicated.             Imaging:    CT Abdomen Pelvis With Contrast    Result Date: 12/14/2024  CT ABDOMEN PELVIS W CONTRAST-  Date of exam: 12/14/2024, 2:12 A.M.  Indications: Left upper quadrant tenderness; hematemesis.  Comparison: None available.  TECHNIQUE: Axial CT images were obtained of the abdomen and pelvis following the uneventful intravenous administration of 80 mL (or less) of Isovue-370 contrast agent. Reconstructed 2D (two-dimensional) coronal and sagittal images were also obtained. Automated exposure control and iterative construction methods were used. Additionally, the radiation dose reduction device was turned on for each scan per the ALARA (As Low as Reasonably Achievable) protocol. No oral contrast agent was administered for the study. Please see the electronic medical record, EMR (i.e., Epic), for the  documented dose of intravenous contrast agent as well as the radiation dose.  FINDINGS: CT findings most suggestive of acute to subacute pancreatitis are noted, especially involving the head of the pancreas. No pancreatic necrosis is seen. No peripancreatic pseudocyst is identified. Secondary gastritis/duodenitis is suspected, as well. No definite gallstones or acute cholecystitis. No biliary ductal dilatation. The main pancreatic duct does not appear dilated. There is diffuse hepatic steatosis with hepatomegaly. There is a suspected benign 1.1 cm hepatic cyst, involving the posterior, superior right lobe of the liver, probably segment 7, as seen on image 38 of series 2 and adjacent images. No splenomegaly. No bowel wall thickening is seen, otherwise. No pneumoperitoneum or pneumatosis. No portal or mesenteric venous gas. No mechanical bowel obstruction. There may be a generalized adynamic ileus. No acute appendicitis. There are prostatic calcifications. Probably no significant prostatic enlargement. No urinary bladder calculi are seen. No adrenal nodule. No nephrolithiasis or ureterolithiasis. No hydronephrosis. No acute pyelonephritis. A tiny fat-containing umbilical hernia is seen. It does not contain bowel. Degenerative changes involve the imaged spine, especially at L5-S1, with chronic retrolisthesis; degenerative changes also involve the bilateral sacroiliac (SI) joints. The lung bases are clear of acute infiltrate. There may be mild atelectasis in the lung bases. No pleural effusion.       1.  There is suspected acute to subacute pancreatitis, especially involving the head of the pancreas, with secondary duodenitis and possible gastritis. Consider close interval clinical, lab, and imaging follow-up to ensure a benign progression and to exclude a complication or underlying malignant process. No definite pancreatic necrosis is seen. No peripancreatic pseudocyst is suspected. No biliary ductal dilatation is  identified. 2.  Otherwise, no acute findings are seen in the abdomen or pelvis by enhanced CT examination. 3.  Please see above comments for further detail.    Portions of this note were completed with a voice recognition program.  Electronically Signed By-Jeremias Case MD On:12/14/2024 2:28 AM         Differential Diagnosis and Discussion:    GI Bleeding: Differential diagnosis includes but is not limited to gastritis, gastric ulcer, stress ulcer, duodenitis, [place] tears, esophageal varices, angiodysplasia, aortic enteric fistula, hematologic issues including thrombocytopenia, GI neoplasm, ulcerative colitis, Crohn's disease, diverticulosis, diverticulitis, hemorrhoids, aortic aneurysm, and polyps  Vomiting: Differential diagnosis includes but is not limited to migraine, labyrinthine disorders, psychogenic, metabolic and endocrine causes, peptic ulcer, gastric outlet obstruction, gastritis, gastroenteritis, appendicitis, intestinal obstruction, paralytic ileus, food poisoning, cholecystitis, acute hepatitis, acute pancreatitis, acute febrile illness, and myocardial infarction.    PROCEDURES:    Labs were drawn in the emergency department and all labs were reviewed and interpreted by me.  CT scan was performed in the emergency department and the CT scan radiology impression was interpreted by me.    No orders to display       Procedures    MDM                     Patient Care Considerations:    SEPSIS IS NOT PRESENT IN THE EMERGENCY DEPARTMENT: Patient meets SIRS criteria in the emergency department however the patient does not have a known source of bacterial infection to confirm the diagnosis of sepsis.        Consultants/Shared Management Plan:    Hospitalist: I have discussed the case with the hospitalist who agrees to accept the patient for admission.    Social Determinants of Health:    Patient is independent, reliable, and has access to care.       Disposition and Care Coordination:    Admit:    Through independent evaluation of the patient's history, physical, and imperical data, the patient meets criteria for inpatient admission to the hospital.        Final diagnoses:   Hematemesis with nausea   Duodenitis with bleeding        ED Disposition       ED Disposition   Decision to Admit    Condition   --    Comment   Level of Care: Telemetry [5]   Diagnosis: Hematemesis [578.0.ICD-9-CM]   Admitting Physician: BEATRICE CLEMENTS [[id number]]   Attending Physician: BEATRICE CLEMENTS [[id number]]   Certification: I Certify That Inpatient Hospital Services Are Medically Necessary For Greater Than 2 Midnights                 This medical record created using voice recognition software.             Devin Evans MD  12/14/24 0624

## 2024-12-14 NOTE — PLAN OF CARE
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 Biopsy collected and MRI of mass to be performed. Bleeding cauterized. Slightly hypertensive, PO BP meds restarted. Tolerating heart healthy diet well, no pain. CIWA scores 1 this shift.

## 2024-12-14 NOTE — ANESTHESIA POSTPROCEDURE EVALUATION
Patient: Ahsan Moran    Procedure Summary       Date: 12/14/24 Room / Location: [facility]  /  [facility]    Anesthesia Start: 1249 Anesthesia Stop: 1402    Procedure: ESOPHAGOGASTRODUODENOSCOPY WITH  POLYP BIOPSY AND APC Diagnosis:     Surgeons: Javier Ramirez MD Provider: Bola Cotto MD    Anesthesia Type: general ASA Status: 3            Anesthesia Type: general    Vitals  Vitals Value Taken Time   /78 12/14/24 1412   Temp 36.1 °C (97 °F) 12/14/24 1355   Pulse 71 12/14/24 1413   Resp 16 12/14/24 1355   SpO2 95 % 12/14/24 1413   Vitals shown include unfiled device data.        Post Anesthesia Care and Evaluation    Patient location during evaluation: bedside  Patient participation: complete - patient participated  Level of consciousness: awake    Airway patency: patent  PONV Status: none  Cardiovascular status: acceptable  Respiratory status: acceptable  Hydration status: acceptable

## 2024-12-15 ENCOUNTER — READMISSION MANAGEMENT (OUTPATIENT)
Dept: CALL CENTER | Facility: HOSPITAL | Age: 44
End: 2024-12-15
Payer: OTHER GOVERNMENT

## 2024-12-15 VITALS
TEMPERATURE: 98.2 F | WEIGHT: 264.55 LBS | OXYGEN SATURATION: 100 % | RESPIRATION RATE: 16 BRPM | HEART RATE: 63 BPM | SYSTOLIC BLOOD PRESSURE: 152 MMHG | HEIGHT: 72 IN | DIASTOLIC BLOOD PRESSURE: 100 MMHG | BODY MASS INDEX: 35.83 KG/M2

## 2024-12-15 LAB
ALBUMIN SERPL-MCNC: 3.7 G/DL (ref 3.5–5.2)
ALBUMIN/GLOB SERPL: 1.4 G/DL
ALP SERPL-CCNC: 84 U/L (ref 39–117)
ALT SERPL W P-5'-P-CCNC: 16 U/L (ref 1–41)
ANION GAP SERPL CALCULATED.3IONS-SCNC: 10 MMOL/L (ref 5–15)
AST SERPL-CCNC: 40 U/L (ref 1–40)
BASOPHILS # BLD AUTO: 0.02 10*3/MM3 (ref 0–0.2)
BASOPHILS NFR BLD AUTO: 0.4 % (ref 0–1.5)
BILIRUB SERPL-MCNC: 0.9 MG/DL (ref 0–1.2)
BUN SERPL-MCNC: 7 MG/DL (ref 6–20)
BUN/CREAT SERPL: 8 (ref 7–25)
CALCIUM SPEC-SCNC: 8.3 MG/DL (ref 8.6–10.5)
CHLORIDE SERPL-SCNC: 104 MMOL/L (ref 98–107)
CO2 SERPL-SCNC: 22 MMOL/L (ref 22–29)
CREAT SERPL-MCNC: 0.88 MG/DL (ref 0.76–1.27)
DEPRECATED RDW RBC AUTO: 41.6 FL (ref 37–54)
EGFRCR SERPLBLD CKD-EPI 2021: 108.1 ML/MIN/1.73
EOSINOPHIL # BLD AUTO: 0 10*3/MM3 (ref 0–0.4)
EOSINOPHIL NFR BLD AUTO: 0 % (ref 0.3–6.2)
ERYTHROCYTE [DISTWIDTH] IN BLOOD BY AUTOMATED COUNT: 12.1 % (ref 12.3–15.4)
GLOBULIN UR ELPH-MCNC: 2.7 GM/DL
GLUCOSE SERPL-MCNC: 147 MG/DL (ref 65–99)
HCT VFR BLD AUTO: 37.3 % (ref 37.5–51)
HGB BLD-MCNC: 12.9 G/DL (ref 13–17.7)
IMM GRANULOCYTES # BLD AUTO: 0.03 10*3/MM3 (ref 0–0.05)
IMM GRANULOCYTES NFR BLD AUTO: 0.6 % (ref 0–0.5)
LYMPHOCYTES # BLD AUTO: 0.68 10*3/MM3 (ref 0.7–3.1)
LYMPHOCYTES NFR BLD AUTO: 13.7 % (ref 19.6–45.3)
MAGNESIUM SERPL-MCNC: 1.9 MG/DL (ref 1.6–2.6)
MCH RBC QN AUTO: 32.7 PG (ref 26.6–33)
MCHC RBC AUTO-ENTMCNC: 34.6 G/DL (ref 31.5–35.7)
MCV RBC AUTO: 94.4 FL (ref 79–97)
MONOCYTES # BLD AUTO: 0.68 10*3/MM3 (ref 0.1–0.9)
MONOCYTES NFR BLD AUTO: 13.7 % (ref 5–12)
NEUTROPHILS NFR BLD AUTO: 3.56 10*3/MM3 (ref 1.7–7)
NEUTROPHILS NFR BLD AUTO: 71.6 % (ref 42.7–76)
NRBC BLD AUTO-RTO: 0 /100 WBC (ref 0–0.2)
PHOSPHATE SERPL-MCNC: 0.9 MG/DL (ref 2.5–4.5)
PLATELET # BLD AUTO: 125 10*3/MM3 (ref 140–450)
PMV BLD AUTO: 10 FL (ref 6–12)
POTASSIUM SERPL-SCNC: 4.4 MMOL/L (ref 3.5–5.2)
PROT SERPL-MCNC: 6.4 G/DL (ref 6–8.5)
RBC # BLD AUTO: 3.95 10*6/MM3 (ref 4.14–5.8)
SODIUM SERPL-SCNC: 136 MMOL/L (ref 136–145)
WBC NRBC COR # BLD AUTO: 4.97 10*3/MM3 (ref 3.4–10.8)

## 2024-12-15 PROCEDURE — 36415 COLL VENOUS BLD VENIPUNCTURE: CPT | Performed by: INTERNAL MEDICINE

## 2024-12-15 PROCEDURE — 85025 COMPLETE CBC W/AUTO DIFF WBC: CPT | Performed by: INTERNAL MEDICINE

## 2024-12-15 PROCEDURE — 25810000003 SODIUM CHLORIDE 0.9 % SOLUTION: Performed by: FAMILY MEDICINE

## 2024-12-15 PROCEDURE — 25010000002 THIAMINE HCL 200 MG/2ML SOLUTION: Performed by: INTERNAL MEDICINE

## 2024-12-15 PROCEDURE — 99239 HOSP IP/OBS DSCHRG MGMT >30: CPT | Performed by: INTERNAL MEDICINE

## 2024-12-15 PROCEDURE — 80053 COMPREHEN METABOLIC PANEL: CPT | Performed by: INTERNAL MEDICINE

## 2024-12-15 PROCEDURE — 25010000003 DEXTROSE 5 % SOLUTION: Performed by: PHYSICIAN ASSISTANT

## 2024-12-15 PROCEDURE — 83735 ASSAY OF MAGNESIUM: CPT | Performed by: INTERNAL MEDICINE

## 2024-12-15 PROCEDURE — 84100 ASSAY OF PHOSPHORUS: CPT | Performed by: INTERNAL MEDICINE

## 2024-12-15 RX ORDER — OXYCODONE AND ACETAMINOPHEN 5; 325 MG/1; MG/1
1 TABLET ORAL EVERY 6 HOURS PRN
Qty: 10 TABLET | Refills: 0 | Status: SHIPPED | OUTPATIENT
Start: 2024-12-15 | End: 2024-12-18

## 2024-12-15 RX ADMIN — THIAMINE HYDROCHLORIDE 200 MG: 100 INJECTION, SOLUTION INTRAMUSCULAR; INTRAVENOUS at 08:36

## 2024-12-15 RX ADMIN — METOPROLOL SUCCINATE 50 MG: 50 TABLET, EXTENDED RELEASE ORAL at 08:36

## 2024-12-15 RX ADMIN — Medication 10 ML: at 08:37

## 2024-12-15 RX ADMIN — LISINOPRIL 20 MG: 20 TABLET ORAL at 08:36

## 2024-12-15 RX ADMIN — PANTOPRAZOLE SODIUM 40 MG: 40 INJECTION, POWDER, FOR SOLUTION INTRAVENOUS at 08:36

## 2024-12-15 RX ADMIN — SODIUM PHOSPHATE, MONOBASIC, MONOHYDRATE AND SODIUM PHOSPHATE, DIBASIC, ANHYDROUS 15 MMOL: 142; 276 INJECTION, SOLUTION INTRAVENOUS at 10:47

## 2024-12-15 RX ADMIN — FOLIC ACID 1 MG: 5 INJECTION, SOLUTION INTRAMUSCULAR; INTRAVENOUS; SUBCUTANEOUS at 09:50

## 2024-12-15 RX ADMIN — SODIUM PHOSPHATE, MONOBASIC, MONOHYDRATE AND SODIUM PHOSPHATE, DIBASIC, ANHYDROUS 15 MMOL: 142; 276 INJECTION, SOLUTION INTRAVENOUS at 06:28

## 2024-12-15 RX ADMIN — SODIUM CHLORIDE 100 ML/HR: 9 INJECTION, SOLUTION INTRAVENOUS at 01:32

## 2024-12-15 NOTE — PROGRESS NOTES
[facility]   Hospitalist Progress Note  Date: 12/15/2024  Patient Name: Ahsan Moran  : 1980  MRN: [id number]  Date of admission: 2024    Subjective   Subjective     Chief Complaint:   Abdominal pain    Summary:   Patient is a 44 y.o. year old male PMHX of HTN who presents to the emergency department for evaluation of hematemesis. Patient states that he awoke with severe nausea went to the bathroom and threw up. He states that he began to throw up 3 more times all of which were large volume bright red blood. He has pain in his left upper quadrant abdomen and continues to have severe nausea but no additional episodes of emesis. He reports a normal bowel movement earlier in the day he denies any melena or hematochezia. He takes a baby aspirin daily. He reports that he has 8-10 beers several times a week. He denies any fevers, chills, diaphoresis, chest pain, shortness of breath, constipation or diarrhea. In the ED he was given 1 L bolus of normal saline, Protonix, and Zofran. 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, this was discussed with the patient, seems to be tolerating diet currently, hemoglobin did drop from yesterday, denies hematemesis, no nausea vomiting or diarrhea    Objective   Objective     Vitals:   Temp:  [97 °F (36.1 °C)-99 °F (37.2 °C)] 98.2 °F (36.8 °C)  Heart Rate:  [] 67  Resp:  [14-16] 16  BP: (117-173)/() 171/110    Physical Exam   GEN: No acute distress  HEENT: Moist mucous membranes  LUNGS: Equal chest rise bilaterally  CARDIAC: Regular rate and rhythm  NEURO: Moving all 4 extremities spontaneously  SKIN: No obvious breakdown    Result Review    Result Review:  I have personally reviewed the results as below  [x]   Laboratory CBC, CMP personally reviewed  CBC          3/8/2024    17:17 12/14/2024    01:34 12/15/2024    04:34   CBC   WBC 7.66  4.04  4.97    RBC 3.92  4.47  3.95    Hemoglobin 12.7  14.8  12.9    Hematocrit 36.9  41.5  37.3    MCV 94.1  92.8  94.4    MCH 32.4  33.1  32.7    MCHC 34.4  35.7  34.6    RDW 12.8  12.2  12.1    Platelets 266  164  125      CMP          3/8/2024    17:17 12/14/2024    01:34 12/15/2024    04:34   CMP   Glucose 92  110  147    BUN 12  10  7    Creatinine 0.96  0.64  0.88    EGFR 99.3  119.0  108.1    Sodium 141  139  136    Potassium 4.5  3.9  4.4    Chloride 101  101  104    Calcium 9.3  8.7  8.3    Total Protein 7.5  7.3  6.4    Albumin 4.2  4.1  3.7    Globulin 3.3  3.2  2.7    Total Bilirubin 0.6  0.4  0.9    Alkaline Phosphatase 74  90  84    AST (SGOT) 37  39  40    ALT (SGPT) 28  17  16    Albumin/Globulin Ratio 1.3  1.3  1.4    BUN/Creatinine Ratio 12.5  15.6  8.0    Anion Gap 13.4  15.6  10.0      []  Microbiology  []  Radiology  []  EKG/Telemetry   []  Cardiology/Vascular   []  Pathology  []  Old records  []  Other:    Scheduled medications:  folic acid 1 mg in sodium chloride 0.9 % 50 mL IVPB, 1 mg, Intravenous, Daily  lisinopril, 20 mg, Oral, Daily  metoprolol succinate XL, 50 mg, Oral, Q24H  pantoprazole, 40 mg, Intravenous, Q12H  sodium chloride, 10 mL, Intravenous, Q12H  sodium phosphate, 15 mmol, Intravenous, Q3H  thiamine (B-1) IV, 200 mg, Intravenous, Daily   Followed by  [START ON 12/19/2024] thiamine, 100 mg, Oral, Daily      As needed medications:    senna-docusate sodium **AND** polyethylene glycol **AND** bisacodyl **AND** bisacodyl    Calcium Replacement - Follow Nurse / BPA Driven Protocol    Magnesium Standard Dose Replacement - Follow Nurse / BPA Driven Protocol    melatonin    metoprolol tartrate    ondansetron    Potassium Replacement - Follow Nurse / BPA Driven Protocol    sodium chloride    sodium chloride    sodium chloride  Infusions:          Assessment &  Plan   Assessment / Plan     Assessment:  Abdominal pain  Pancreatitis  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     Reviewed patient's labs and imaging, and discussed with patient and nurse at bedside.    VTE Prophylaxis:  No VTE prophylaxis order currently exists.        CODE STATUS:   Level Of Support Discussed With: Patient  Code Status (Patient has no pulse and is not breathing): CPR (Attempt to Resuscitate)  Medical Interventions (Patient has pulse or is breathing): Full Support      Electronically signed by Garrison Paulino MD, 12/15/2024, 10:26 EST.

## 2024-12-15 NOTE — PLAN OF CARE
Goal Outcome Evaluation:  Plan of Care Reviewed With: patient        Progress: no change  Outcome Evaluation: Patient cleared by care lian to d/c. Discussed d/c paperwork w/pt.

## 2024-12-15 NOTE — OUTREACH NOTE
Prep Survey      Flowsheet Row Responses   [facility] facility patient discharged from? [place]   Is LACE score < 7 ? Yes   Eligibility Not Eligible   What are the reasons patient is not eligible? Other  [low risk for readmit]   Does the patient have one of the following disease processes/diagnoses(primary or secondary)? Other   Prep survey completed? Yes            Maribel VEGA - Registered Nurse

## 2024-12-15 NOTE — DISCHARGE SUMMARY
[facility]         HOSPITALIST  DISCHARGE SUMMARY    Patient Name: Ahsan Moran  : 1980  MRN: [id number]    Date of Admission: 2024  Date of Discharge:  12/15/2024  Primary Care Physician: Provider, No Known    Consults       Date and Time Order Name Status Description    2024  4:16 AM Inpatient Gastroenterology Consult Completed     2024  3:32 AM Hospitalist (on-call MD unless specified)              Active and Resolved Hospital Problems:  Abdominal pain  Pancreatitis  Ampullary mass pathology pending  Initial concern for GI bleed  Alcohol abuse  History of hypertension    Hospital Course     Hospital Course:  Patient is a 44 y.o. year old male PMHX of HTN who presents to the emergency department for evaluation of hematemesis. Patient states that he awoke with severe nausea went to the bathroom and threw up. He states that he began to throw up 3 more times all of which were large volume bright red blood. He has pain in his left upper quadrant abdomen and continues to have severe nausea but no additional episodes of emesis. He reports a normal bowel movement earlier in the day he denies any melena or hematochezia. He takes a baby aspirin daily. He reports that he has 8-10 beers several times a week. He denies any fevers, chills, diaphoresis, chest pain, shortness of breath, constipation or diarrhea. In the ED he was given 1 L bolus of normal saline, Protonix, and Zofran. He was admitted for further evaluation and management patient went EGD which was significant for a ampullary mass, gross appearance did not seem consistent with cancer per gastroenterology, biopsies were taken and pending.  Patient did have some bleeding postbiopsy and cautery was utilized for hemostasis.  Patient did have drop in hemoglobin however denies any bloody or melanotic stools, no further nausea or vomiting.  Patient was tolerating regular diet and patient was deemed safe to discharge home by  gastroenterology.  Patient will discharge home today in stable condition.  Patient will follow-up with gastroenterology for biopsy results, I did discuss with patient that it would likely be 72+ hours before results are available.  Patient is discharged home today in stable condition    Day of Discharge     Vital Signs:  Temp:  [97 °F (36.1 °C)-99 °F (37.2 °C)] 98.2 °F (36.8 °C)  Heart Rate:  [] 63  Resp:  [14-16] 16  BP: (117-173)/() 152/100    Physical Exam:   GEN: No acute distress  HEENT: Moist mucous membranes  LUNGS: Equal chest rise bilaterally  CARDIAC: Regular rate and rhythm  NEURO: Moving all 4 extremities spontaneously  SKIN: No obvious breakdown    Discharge Details        Discharge Medications        New Medications        Instructions Start Date   oxyCODONE-acetaminophen 5-325 MG per tablet  Commonly known as: Percocet   1 tablet, Oral, Every 6 Hours PRN             Continue These Medications        Instructions Start Date   cholecalciferol 25 MCG (1000 UT) tablet  Commonly known as: VITAMIN D3   2,000 Units, Daily      lisinopril 20 MG tablet  Commonly known as: PRINIVIL,ZESTRIL   1 tablet, Daily      Metoprolol Succinate 50 MG capsule extended-release 24 hour sprinkle   1 tablet, Daily      multivitamin tablet tablet   1 tablet, Daily      omeprazole 20 MG capsule  Commonly known as: priLOSEC   1 capsule, Daily             Stop These Medications      aspirin 81 MG chewable tablet  Commonly known as: Aspirin Childrens              No Known Allergies    Discharge Disposition:  Home or Self Care    Diet:  Hospital:  Diet Order   Procedures    Diet: Cardiac; Healthy Heart (2-3 Na+); Fluid Consistency: Thin (IDDSI 0)       Discharge Activity:       CODE STATUS:  Code Status and Medical Interventions: CPR (Attempt to Resuscitate); Full Support   Ordered at: 12/14/24 0414     Level Of Support Discussed With:    Patient     Code Status (Patient has no pulse and is not breathing):    CPR (Attempt  to Resuscitate)     Medical Interventions (Patient has pulse or is breathing):    Full Support       No future appointments.    Additional Instructions for the Follow-ups that You Need to Schedule       Discharge Follow-up with PCP   As directed       Currently Documented PCP:    Provider, No Known    PCP Phone Number:    None     Follow Up Details: 3 to 7 days        Discharge Follow-up with Specified Provider: SONYA howard; 2 Weeks   As directed      To: SONYA howard   Follow Up: 2 Weeks                Pertinent  and/or Most Recent Results     IMAGING:  MRI abdomen wo contrast mrcp    Result Date: 12/14/2024  MRI ABDOMEN WO CONTRAST MRCP Date of Exam: 12/14/2024 4:15 PM EST Indication: Ampullary mass on EGD. Patient with c/o LUQ abdominal pain.  Comparison: CT abdomen and pelvis with contrast 12/14/2024 Technique:  Routine multiplanar/multisequence images of the abdomen were obtained with MRCP sequences without contrast administration. Findings: Visualized lower chest demonstrates clear lung bases. Heart size normal. Negative for pericardial or pleural effusion. The liver is enlarged measuring 20 cm in craniocaudal length. Diffuse loss of signal throughout the hepatic parenchyma in out of phase imaging consistent with underlying hepatic steatosis. No suspicious liver lesion within limits of a noncontrast exam. The gallbladder is present. There is no cholelithiasis. No gallbladder wall thickening or pericholecystic inflammation. The common bile duct is normal in caliber measuring 6 mm. No visualized cholelithiasis. The distal 1.4 cm of the common bile duct not visualized (7/31). The spleen is normal in size. Both adrenal glands are normal. The pancreas is normal in T1 signal intensity. There is no pancreatic main duct dilatation. There is a mild edema surrounding the second portion the duodenum which may relate to duodenitis or groove pancreatitis. The remaining pancreatic parenchyma is without edema or inflammation. The  kidneys are symmetric in size. There is no hydronephrosis or hydroureter.  Small cyst at the upper pole of the left kidney measuring 6 mm. Small cyst at the lower pole the right kidney measuring 7 mm. No significant ascites. Abdominal aorta without aneurysm. No dilated bowel loops in the upper abdomen. There is a polypoid mass involving the second portion of the duodenum in the region of the ampulla as seen on the recent upper endoscopy study with representative measurement of 1.8 x 1.6 cm (10/23, 13/24). Exact margins of this mass are difficult to delineate on noncontrast study. There are several small adjacent lymph nodes for example measuring 6 mm, metastatic adenopathy not excluded (11/22).     Impression: 1. 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 Several small adjacent lymph nodes measure up to 6 mm. Correlate with recent endoscopy biopsy results. 2. Common bile duct normal in caliber measuring 6 mm. No visualized choledocholithiasis. Distal 1.4 cm of the common bile duct not visualized likely secondary to ampullary mass. 3. Hepatomegaly and hepatic steatosis. 4. Mild edema surrounding second portion of the duodenum may relate to superimposed duodenitis or groove pancreatitis. Electronically Signed: Abhi Glasgow MD  12/14/2024 5:21 PM EST  Workstation ID: [id number]    CT Abdomen Pelvis With Contrast    Result Date: 12/14/2024  CT ABDOMEN PELVIS W CONTRAST-  Date of exam: 12/14/2024, 2:12 A.M.  Indications: Left upper quadrant tenderness; hematemesis.  Comparison: None available.  TECHNIQUE: Axial CT images were obtained of the abdomen and pelvis following the uneventful intravenous administration of 80 mL (or less) of Isovue-370 contrast agent. Reconstructed 2D (two-dimensional) coronal and sagittal images were also obtained. Automated exposure control and iterative construction methods were used. Additionally,  the radiation dose reduction device was turned on for each scan per the ALARA (As Low as Reasonably Achievable) protocol. No oral contrast agent was administered for the study. Please see the electronic medical record, EMR (i.e., Epic), for the documented dose of intravenous contrast agent as well as the radiation dose.  FINDINGS: CT findings most suggestive of acute to subacute pancreatitis are noted, especially involving the head of the pancreas. No pancreatic necrosis is seen. No peripancreatic pseudocyst is identified. Secondary gastritis/duodenitis is suspected, as well. No definite gallstones or acute cholecystitis. No biliary ductal dilatation. The main pancreatic duct does not appear dilated. There is diffuse hepatic steatosis with hepatomegaly. There is a suspected benign 1.1 cm hepatic cyst, involving the posterior, superior right lobe of the liver, probably segment 7, as seen on image 38 of series 2 and adjacent images. No splenomegaly. No bowel wall thickening is seen, otherwise. No pneumoperitoneum or pneumatosis. No portal or mesenteric venous gas. No mechanical bowel obstruction. There may be a generalized adynamic ileus. No acute appendicitis. There are prostatic calcifications. Probably no significant prostatic enlargement. No urinary bladder calculi are seen. No adrenal nodule. No nephrolithiasis or ureterolithiasis. No hydronephrosis. No acute pyelonephritis. A tiny fat-containing umbilical hernia is seen. It does not contain bowel. Degenerative changes involve the imaged spine, especially at L5-S1, with chronic retrolisthesis; degenerative changes also involve the bilateral sacroiliac (SI) joints. The lung bases are clear of acute infiltrate. There may be mild atelectasis in the lung bases. No pleural effusion.       1.  There is suspected acute to subacute pancreatitis, especially involving the head of the pancreas, with secondary duodenitis and possible gastritis. Consider close interval  clinical, lab, and imaging follow-up to ensure a benign progression and to exclude a complication or underlying malignant process. No definite pancreatic necrosis is seen. No peripancreatic pseudocyst is suspected. No biliary ductal dilatation is identified. 2.  Otherwise, no acute findings are seen in the abdomen or pelvis by enhanced CT examination. 3.  Please see above comments for further detail.    Portions of this note were completed with a voice recognition program.  Electronically Signed By-Jeremias Case MD On:12/14/2024 2:28 AM        LAB RESULTS:      Lab 12/15/24  0434 12/14/24  0134   WBC 4.97 4.04   HEMOGLOBIN 12.9* 14.8   HEMATOCRIT 37.3* 41.5   PLATELETS 125* 164   NEUTROS ABS 3.56 2.20   IMMATURE GRANS (ABS) 0.03 0.02   LYMPHS ABS 0.68* 1.38   MONOS ABS 0.68 0.38   EOS ABS 0.00 0.03   MCV 94.4 92.8         Lab 12/15/24  0434 12/14/24  0134   SODIUM 136 139   POTASSIUM 4.4 3.9   CHLORIDE 104 101   CO2 22.0 22.4   ANION GAP 10.0 15.6*   BUN 7 10   CREATININE 0.88 0.64*   EGFR 108.1 119.0   GLUCOSE 147* 110*   CALCIUM 8.3* 8.7   MAGNESIUM 1.9  --    PHOSPHORUS 0.9*  --          Lab 12/15/24  0434 12/14/24  0134   TOTAL PROTEIN 6.4 7.3   ALBUMIN 3.7 4.1   GLOBULIN 2.7 3.2   ALT (SGPT) 16 17   AST (SGOT) 40 39   BILIRUBIN 0.9 0.4   ALK PHOS 84 90   LIPASE  --  24                 Lab 12/14/24  0328 12/14/24  0134   ABO TYPING A A   RH TYPING Positive Positive   ANTIBODY SCREEN  --  Negative         Brief Urine Lab Results  (Last result in the past 365 days)        Color   Clarity   Blood   Leuk Est   Nitrite   Protein   CREAT   Urine HCG        12/14/24 0510 Yellow   Clear   Negative   Negative   Negative   Trace                 Microbiology Results (last 10 days)       ** No results found for the last 240 hours. **              Results for orders placed during the hospital encounter of 02/26/24    Adult Transthoracic Echo Complete W/ Cont if Necessary Per Protocol (With Agitated Saline)    Interpretation  Summary    Calculated left ventricular EF = 56.2%    No obvious wall motion abnormalities    No obvious valvular abnormalities    Negative bubble study for R-->L shunt      Time spent on Discharge including face to face service: Greater than 30 minutes      Electronically signed by Garrison Paulino MD, 12/15/24, 1:36 PM EST.

## 2024-12-17 LAB
CYTO UR: NORMAL
LAB AP CASE REPORT: NORMAL
LAB AP CLINICAL INFORMATION: NORMAL
LAB AP DIAGNOSIS COMMENT: NORMAL
PATH REPORT.FINAL DX SPEC: NORMAL
PATH REPORT.GROSS SPEC: NORMAL

## 2024-12-26 ENCOUNTER — TELEPHONE (OUTPATIENT)
Dept: GASTROENTEROLOGY | Facility: CLINIC | Age: 44
End: 2024-12-26
Payer: OTHER GOVERNMENT

## 2024-12-26 NOTE — TELEPHONE ENCOUNTER
----- Message from Roberta Cowan sent at 12/25/2024  5:04 PM EST -----  I have reviewed upper endoscopy. 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 No repeat EGD recommended at this time.

## 2025-03-03 ENCOUNTER — APPOINTMENT (OUTPATIENT)
Dept: GENERAL RADIOLOGY | Facility: HOSPITAL | Age: 45
End: 2025-03-03
Payer: OTHER GOVERNMENT

## 2025-03-03 ENCOUNTER — HOSPITAL ENCOUNTER (EMERGENCY)
Facility: HOSPITAL | Age: 45
Discharge: HOME | End: 2025-03-03
Attending: EMERGENCY MEDICINE | Admitting: EMERGENCY MEDICINE
Payer: OTHER GOVERNMENT

## 2025-03-03 ENCOUNTER — APPOINTMENT (OUTPATIENT)
Dept: CT IMAGING | Facility: HOSPITAL | Age: 45
End: 2025-03-03
Payer: OTHER GOVERNMENT

## 2025-03-03 VITALS
SYSTOLIC BLOOD PRESSURE: 132 MMHG | RESPIRATION RATE: 15 BRPM | WEIGHT: 235.45 LBS | BODY MASS INDEX: 31.89 KG/M2 | TEMPERATURE: 98.7 F | OXYGEN SATURATION: 98 % | DIASTOLIC BLOOD PRESSURE: 99 MMHG | HEART RATE: 83 BPM | HEIGHT: 72 IN

## 2025-03-03 DIAGNOSIS — F10.10 ALCOHOL ABUSE: ICD-10-CM

## 2025-03-03 DIAGNOSIS — R74.8 ELEVATED LIVER ENZYMES: ICD-10-CM

## 2025-03-03 DIAGNOSIS — R42 DIZZINESS: Primary | ICD-10-CM

## 2025-03-03 LAB
ALBUMIN SERPL-MCNC: 4.3 G/DL (ref 3.5–5.2)
ALBUMIN/GLOB SERPL: 1.3 G/DL
ALP SERPL-CCNC: 157 U/L (ref 39–117)
ALT SERPL W P-5'-P-CCNC: 252 U/L (ref 1–41)
ANION GAP SERPL CALCULATED.3IONS-SCNC: 17.3 MMOL/L (ref 5–15)
AST SERPL-CCNC: 164 U/L (ref 1–40)
BACTERIA UR QL AUTO: NORMAL /HPF
BASOPHILS # BLD AUTO: 0 10*3/MM3 (ref 0–0.2)
BASOPHILS NFR BLD AUTO: 0 % (ref 0–1.5)
BILIRUB SERPL-MCNC: 1.4 MG/DL (ref 0–1.2)
BILIRUB UR QL STRIP: NEGATIVE
BUN SERPL-MCNC: 21 MG/DL (ref 6–20)
BUN/CREAT SERPL: 18.8 (ref 7–25)
CALCIUM SPEC-SCNC: 10.5 MG/DL (ref 8.6–10.5)
CHLORIDE SERPL-SCNC: 88 MMOL/L (ref 98–107)
CLARITY UR: CLEAR
CO2 SERPL-SCNC: 24.7 MMOL/L (ref 22–29)
COLOR UR: YELLOW
CREAT SERPL-MCNC: 1.12 MG/DL (ref 0.76–1.27)
DEPRECATED RDW RBC AUTO: 45.1 FL (ref 37–54)
EGFRCR SERPLBLD CKD-EPI 2021: 82 ML/MIN/1.73
EOSINOPHIL # BLD AUTO: 0.01 10*3/MM3 (ref 0–0.4)
EOSINOPHIL NFR BLD AUTO: 0.2 % (ref 0.3–6.2)
ERYTHROCYTE [DISTWIDTH] IN BLOOD BY AUTOMATED COUNT: 12.6 % (ref 12.3–15.4)
GEN 5 1HR TROPONIN T REFLEX: 7 NG/L
GLOBULIN UR ELPH-MCNC: 3.2 GM/DL
GLUCOSE SERPL-MCNC: 115 MG/DL (ref 65–99)
GLUCOSE UR STRIP-MCNC: NEGATIVE MG/DL
HCT VFR BLD AUTO: 37.9 % (ref 37.5–51)
HGB BLD-MCNC: 13.1 G/DL (ref 13–17.7)
HGB UR QL STRIP.AUTO: NEGATIVE
HOLD SPECIMEN: NORMAL
HOLD SPECIMEN: NORMAL
HYALINE CASTS UR QL AUTO: NORMAL /LPF
IMM GRANULOCYTES # BLD AUTO: 0.02 10*3/MM3 (ref 0–0.05)
IMM GRANULOCYTES NFR BLD AUTO: 0.4 % (ref 0–0.5)
KETONES UR QL STRIP: NEGATIVE
LEUKOCYTE ESTERASE UR QL STRIP.AUTO: ABNORMAL
LYMPHOCYTES # BLD AUTO: 0.72 10*3/MM3 (ref 0.7–3.1)
LYMPHOCYTES NFR BLD AUTO: 14.9 % (ref 19.6–45.3)
MAGNESIUM SERPL-MCNC: 1.3 MG/DL (ref 1.6–2.6)
MCH RBC QN AUTO: 33.7 PG (ref 26.6–33)
MCHC RBC AUTO-ENTMCNC: 34.6 G/DL (ref 31.5–35.7)
MCV RBC AUTO: 97.4 FL (ref 79–97)
MONOCYTES # BLD AUTO: 0.54 10*3/MM3 (ref 0.1–0.9)
MONOCYTES NFR BLD AUTO: 11.2 % (ref 5–12)
NEUTROPHILS NFR BLD AUTO: 3.53 10*3/MM3 (ref 1.7–7)
NEUTROPHILS NFR BLD AUTO: 73.3 % (ref 42.7–76)
NITRITE UR QL STRIP: NEGATIVE
NRBC BLD AUTO-RTO: 0 /100 WBC (ref 0–0.2)
PH UR STRIP.AUTO: 6.5 [PH] (ref 5–8)
PLATELET # BLD AUTO: 57 10*3/MM3 (ref 140–450)
PMV BLD AUTO: 11.8 FL (ref 6–12)
POTASSIUM SERPL-SCNC: 3.6 MMOL/L (ref 3.5–5.2)
PROT SERPL-MCNC: 7.5 G/DL (ref 6–8.5)
PROT UR QL STRIP: NEGATIVE
QT INTERVAL: 414 MS
QTC INTERVAL: 456 MS
RBC # BLD AUTO: 3.89 10*6/MM3 (ref 4.14–5.8)
RBC # UR STRIP: NORMAL /HPF
REF LAB TEST METHOD: NORMAL
SODIUM SERPL-SCNC: 130 MMOL/L (ref 136–145)
SP GR UR STRIP: 1.01 (ref 1–1.03)
SQUAMOUS #/AREA URNS HPF: NORMAL /HPF
TROPONIN T NUMERIC DELTA: 0 NG/L
TROPONIN T SERPL HS-MCNC: 7 NG/L
UROBILINOGEN UR QL STRIP: ABNORMAL
WBC # UR STRIP: NORMAL /HPF
WBC NRBC COR # BLD AUTO: 4.82 10*3/MM3 (ref 3.4–10.8)
WHOLE BLOOD HOLD COAG: NORMAL
WHOLE BLOOD HOLD SPECIMEN: NORMAL

## 2025-03-03 PROCEDURE — 25010000002 THIAMINE PER 100 MG: Performed by: EMERGENCY MEDICINE

## 2025-03-03 PROCEDURE — 36415 COLL VENOUS BLD VENIPUNCTURE: CPT

## 2025-03-03 PROCEDURE — 25010000002 LORAZEPAM PER 2 MG: Performed by: EMERGENCY MEDICINE

## 2025-03-03 PROCEDURE — 25810000003 SODIUM CHLORIDE 0.9 % SOLUTION: Performed by: EMERGENCY MEDICINE

## 2025-03-03 PROCEDURE — 96375 TX/PRO/DX INJ NEW DRUG ADDON: CPT

## 2025-03-03 PROCEDURE — 99284 EMERGENCY DEPT VISIT MOD MDM: CPT

## 2025-03-03 PROCEDURE — 93005 ELECTROCARDIOGRAM TRACING: CPT

## 2025-03-03 PROCEDURE — 96365 THER/PROPH/DIAG IV INF INIT: CPT

## 2025-03-03 PROCEDURE — 70450 CT HEAD/BRAIN W/O DYE: CPT

## 2025-03-03 PROCEDURE — 85025 COMPLETE CBC W/AUTO DIFF WBC: CPT | Performed by: EMERGENCY MEDICINE

## 2025-03-03 PROCEDURE — 81001 URINALYSIS AUTO W/SCOPE: CPT | Performed by: EMERGENCY MEDICINE

## 2025-03-03 PROCEDURE — 96361 HYDRATE IV INFUSION ADD-ON: CPT

## 2025-03-03 PROCEDURE — 84484 ASSAY OF TROPONIN QUANT: CPT | Performed by: EMERGENCY MEDICINE

## 2025-03-03 PROCEDURE — 83735 ASSAY OF MAGNESIUM: CPT | Performed by: EMERGENCY MEDICINE

## 2025-03-03 PROCEDURE — 80053 COMPREHEN METABOLIC PANEL: CPT | Performed by: EMERGENCY MEDICINE

## 2025-03-03 PROCEDURE — 71045 X-RAY EXAM CHEST 1 VIEW: CPT

## 2025-03-03 PROCEDURE — 93005 ELECTROCARDIOGRAM TRACING: CPT | Performed by: EMERGENCY MEDICINE

## 2025-03-03 RX ORDER — SODIUM CHLORIDE 0.9 % (FLUSH) 0.9 %
10 SYRINGE (ML) INJECTION AS NEEDED
Status: DISCONTINUED | OUTPATIENT
Start: 2025-03-03 | End: 2025-03-03 | Stop reason: HOSPADM

## 2025-03-03 RX ORDER — SODIUM CHLORIDE 9 MG/ML
1000 INJECTION, SOLUTION INTRAVENOUS ONCE
Status: COMPLETED | OUTPATIENT
Start: 2025-03-03 | End: 2025-03-03

## 2025-03-03 RX ORDER — THIAMINE HYDROCHLORIDE 100 MG/ML
200 INJECTION, SOLUTION INTRAMUSCULAR; INTRAVENOUS ONCE
Status: COMPLETED | OUTPATIENT
Start: 2025-03-03 | End: 2025-03-03

## 2025-03-03 RX ORDER — LORAZEPAM 2 MG/ML
1 INJECTION INTRAMUSCULAR ONCE
Status: COMPLETED | OUTPATIENT
Start: 2025-03-03 | End: 2025-03-03

## 2025-03-03 RX ADMIN — SODIUM CHLORIDE 1000 ML: 9 INJECTION, SOLUTION INTRAVENOUS at 13:07

## 2025-03-03 RX ADMIN — THIAMINE HYDROCHLORIDE 200 MG: 100 INJECTION, SOLUTION INTRAMUSCULAR; INTRAVENOUS at 13:03

## 2025-03-03 RX ADMIN — FOLIC ACID 1 MG: 5 INJECTION, SOLUTION INTRAMUSCULAR; INTRAVENOUS; SUBCUTANEOUS at 13:04

## 2025-03-03 RX ADMIN — SODIUM CHLORIDE 1000 ML: 9 INJECTION, SOLUTION INTRAVENOUS at 12:16

## 2025-03-03 RX ADMIN — LORAZEPAM 1 MG: 2 INJECTION INTRAMUSCULAR; INTRAVENOUS at 13:02

## 2025-03-03 NOTE — ED PROVIDER NOTES
Time: 12:10 PM EST  Date of encounter:  3/3/2025  Independent Historian/Clinical History and Information was obtained by:   Patient    History is limited by: N/A    Chief Complaint: Lightheadedness, possible bug, blurred vision      History of Present Illness:  Patient is a 45 y.o. year old male who presents to the emergency department for evaluation of lightheadedness, possible bugs, blurred vision.  Patient has a history of PTSD, alcohol abuse, stroke who presents with complaints of not feeling well.  States he has not felt well for roughly 2 days.  States he has felt a little lightheaded as well as had some blurred vision and has not felt well.  Does state that he has not drink for several days.  Does drink pretty regular.      Patient Care Team  Primary Care Provider: Provider, Katherin Known    Past Medical History:     No Known Allergies  Past Medical History:   Diagnosis Date    A-fib     Head injury 1998    Headache, tension-type     Hypertension     PTSD (post-traumatic stress disorder)     Sleep apnea     Stroke     TIA (transient ischemic attack)     Tinnitus      Past Surgical History:   Procedure Laterality Date    ENDOSCOPY N/A 12/14/2024    Procedure: ESOPHAGOGASTRODUODENOSCOPY WITH  POLYP BIOPSY AND APC;  Surgeon: Javier Ramirez MD;  Location: [facility] OR [facility] [place];  Service: Gastroenterology;  Laterality: N/A;  AMPULLA SWELLING, AMPULLARY MASS    WISDOM TOOTH EXTRACTION       History reviewed. No pertinent family history.    Home Medications:  Prior to Admission medications    Medication Sig Start Date End Date Taking? Authorizing Provider   Cholecalciferol 25 MCG (1000 UT) tablet Take 2 tablets by mouth Daily.    Wilfred Benoit MD   lisinopril (PRINIVIL,ZESTRIL) 20 MG tablet Take 1 tablet by mouth Daily.    Wilfred Benoit MD   Metoprolol Succinate 50 MG capsule extended-release 24 hour sprinkle Take 1 tablet by mouth Daily.    Wilfred Benoit MD   Multi Vitamin tablet tablet Take 1 tablet  "by mouth Daily.    Provider, MD Wilfred   omeprazole (priLOSEC) 20 MG capsule Take 1 capsule by mouth Daily.    Provider, MD Wilfred        Social History:   Social History     Tobacco Use    Smoking status: Never     Passive exposure: Past    Smokeless tobacco: Current     Types: Snuff   Vaping Use    Vaping status: Never Used   Substance Use Topics    Alcohol use: Yes     Alcohol/week: 40.0 standard drinks of alcohol     Types: 40 Cans of beer per week    Drug use: Not Currently         Review of Systems:  Review of Systems     Physical Exam:  /97 (BP Location: Left arm, Patient Position: Lying)   Pulse 81   Temp 98.9 °F (37.2 °C) (Oral)   Resp 16   Ht 182.9 cm (72\")   Wt 107 kg (235 lb 7.2 oz)   SpO2 100%   BMI 31.93 kg/m²     Physical Exam  Vitals and nursing note reviewed.   Constitutional:       Appearance: Normal appearance.   HENT:      Head: Normocephalic and atraumatic.   Eyes:      General: No scleral icterus.     Comments: Bloodshot eyes   Cardiovascular:      Rate and Rhythm: Normal rate and regular rhythm.      Heart sounds: Normal heart sounds.   Pulmonary:      Effort: Pulmonary effort is normal.      Breath sounds: Normal breath sounds.   Abdominal:      Palpations: Abdomen is soft.      Tenderness: There is no abdominal tenderness.   Musculoskeletal:         General: Normal range of motion.      Cervical back: Normal range of motion.   Skin:     Findings: No rash.   Neurological:      General: No focal deficit present.      Mental Status: He is alert.                    Medical Decision Making:      Comorbidities that affect care:    Hypertension, Substance Abuse    External Notes reviewed:    Reviewed discharge summary from 12/15/2024      The following orders were placed and all results were independently analyzed by me:  Orders Placed This Encounter   Procedures    XR Chest 1 View    CT Head Without Contrast    Thorp Draw    Comprehensive Metabolic Panel    High " Sensitivity Troponin T    Magnesium    Urinalysis With Microscopic If Indicated (No Culture) - Urine, Clean Catch    CBC Auto Differential    High Sensitivity Troponin T 1Hr    Urinalysis, Microscopic Only - Urine, Clean Catch    NPO Diet NPO Type: Strict NPO    Undress & Gown    Continuous Pulse Oximetry    Vital Signs    Orthostatic Blood Pressure    Oxygen Therapy- Nasal Cannula; Titrate 1-6 LPM Per SpO2; 90 - 95%    POC Glucose Once    ECG 12 Lead ED Triage Standing Order; Weak / Dizzy / AMS    Insert Peripheral IV    Fall Precautions    CBC & Differential    Green Top (Gel)    Lavender Top    Gold Top - SST    Light Blue Top       Medications Given in the Emergency Department:  Medications   sodium chloride 0.9 % flush 10 mL (has no administration in time range)   sodium chloride 0.9 % bolus 1,000 mL (0 mL Intravenous Stopped 3/3/25 1316)   sodium chloride 0.9 % infusion 1,000 mL (1,000 mL Intravenous New Bag 3/3/25 1307)   folic acid 1 mg in sodium chloride 0.9 % 50 mL IVPB (0 mg Intravenous Stopped 3/3/25 1334)   thiamine (B-1) injection 200 mg (200 mg Intravenous Given 3/3/25 1303)   LORazepam (ATIVAN) injection 1 mg (1 mg Intravenous Given 3/3/25 1302)        ED Course:         Labs:    Lab Results (last 24 hours)       Procedure Component Value Units Date/Time    CBC & Differential [[id number]]  (Abnormal) Collected: 03/03/25 0954    Specimen: Blood from Arm, Right Updated: 03/03/25 1124    Narrative:      The following orders were created for panel order CBC & Differential.  Procedure                               Abnormality         Status                     ---------                               -----------         ------                     CBC Auto Differential[[id number]]        Abnormal            Final result                 Please view results for these tests on the individual orders.    Comprehensive Metabolic Panel [[id number]]  (Abnormal) Collected: 03/03/25 0954    Specimen: Blood from Arm,  Right Updated: 03/03/25 1028     Glucose 115 mg/dL      BUN 21 mg/dL      Creatinine 1.12 mg/dL      Sodium 130 mmol/L      Potassium 3.6 mmol/L      Chloride 88 mmol/L      CO2 24.7 mmol/L      Calcium 10.5 mg/dL      Total Protein 7.5 g/dL      Albumin 4.3 g/dL      ALT (SGPT) 252 U/L      AST (SGOT) 164 U/L      Alkaline Phosphatase 157 U/L      Total Bilirubin 1.4 mg/dL      Globulin 3.2 gm/dL      A/G Ratio 1.3 g/dL      BUN/Creatinine Ratio 18.8     Anion Gap 17.3 mmol/L      eGFR 82.0 mL/min/1.73     Narrative:      GFR Categories in Chronic Kidney Disease (CKD)      GFR Category          GFR (mL/min/1.73)    Interpretation  G1                     90 or greater         Normal or high (1)  G2                      60-89                Mild decrease (1)  G3a                   45-59                Mild to moderate decrease  G3b                   30-44                Moderate to severe decrease  G4                    15-29                Severe decrease  G5                    14 or less           Kidney failure          (1)In the absence of evidence of kidney disease, neither GFR category G1 or G2 fulfill the criteria for CKD.    eGFR calculation 2021 CKD-EPI creatinine equation, which does not include race as a factor    High Sensitivity Troponin T [[id number]]  (Normal) Collected: 03/03/25 0954    Specimen: Blood from Arm, Right Updated: 03/03/25 1028     HS Troponin T 7 ng/L     Narrative:      High Sensitive Troponin T Reference Range:  <14.0 ng/L- Negative Female for AMI  <22.0 ng/L- Negative Male for AMI  >=14 - Abnormal Female indicating possible myocardial injury.  >=22 - Abnormal Male indicating possible myocardial injury.   Clinicians would have to utilize clinical acumen, EKG, Troponin, and serial changes to determine if it is an Acute Myocardial Infarction or myocardial injury due to an underlying chronic condition.         Magnesium [[id number]]  (Abnormal) Collected: 03/03/25 0954    Specimen: Blood  from Arm, Right Updated: 03/03/25 1028     Magnesium 1.3 mg/dL     CBC Auto Differential [[id number]]  (Abnormal) Collected: 03/03/25 0954    Specimen: Blood from Arm, Right Updated: 03/03/25 1124     WBC 4.82 10*3/mm3      RBC 3.89 10*6/mm3      Hemoglobin 13.1 g/dL      Hematocrit 37.9 %      MCV 97.4 fL      MCH 33.7 pg      MCHC 34.6 g/dL      RDW 12.6 %      RDW-SD 45.1 fl      MPV 11.8 fL      Platelets 57 10*3/mm3      Neutrophil % 73.3 %      Lymphocyte % 14.9 %      Monocyte % 11.2 %      Eosinophil % 0.2 %      Basophil % 0.0 %      Immature Grans % 0.4 %      Neutrophils, Absolute 3.53 10*3/mm3      Lymphocytes, Absolute 0.72 10*3/mm3      Monocytes, Absolute 0.54 10*3/mm3      Eosinophils, Absolute 0.01 10*3/mm3      Basophils, Absolute 0.00 10*3/mm3      Immature Grans, Absolute 0.02 10*3/mm3      nRBC 0.0 /100 WBC     Urinalysis With Microscopic If Indicated (No Culture) - Urine, Clean Catch [[id number]]  (Abnormal) Collected: 03/03/25 1058    Specimen: Urine, Clean Catch Updated: 03/03/25 1134     Color, UA Yellow     Appearance, UA Clear     pH, UA 6.5     Specific Gravity, UA 1.006     Glucose, UA Negative     Ketones, UA Negative     Bilirubin, UA Negative     Blood, UA Negative     Protein, UA Negative     Leuk Esterase, UA Trace     Nitrite, UA Negative     Urobilinogen, UA 1.0 E.U./dL    Urinalysis, Microscopic Only - Urine, Clean Catch [[id number]] Collected: 03/03/25 1058    Specimen: Urine, Clean Catch Updated: 03/03/25 1134     RBC, UA None Seen /HPF      WBC, UA 0-2 /HPF      Bacteria, UA None Seen /HPF      Squamous Epithelial Cells, UA 0-2 /HPF      Hyaline Casts, UA None Seen /LPF      Methodology Manual Light Microscopy    High Sensitivity Troponin T 1Hr [[id number]]  (Normal) Collected: 03/03/25 1102    Specimen: Blood Updated: 03/03/25 1127     HS Troponin T 7 ng/L      Troponin T Numeric Delta 0 ng/L     Narrative:      High Sensitive Troponin T Reference Range:  <14.0 ng/L- Negative  Female for AMI  <22.0 ng/L- Negative Male for AMI  >=14 - Abnormal Female indicating possible myocardial injury.  >=22 - Abnormal Male indicating possible myocardial injury.   Clinicians would have to utilize clinical acumen, EKG, Troponin, and serial changes to determine if it is an Acute Myocardial Infarction or myocardial injury due to an underlying chronic condition.                  Imaging:    CT Head Without Contrast    Result Date: 3/3/2025  CT HEAD WO CONTRAST Date of Exam: 3/3/2025 1:27 PM EST Indication: dizzy headache. Comparison: CT head 3/8/2024 Technique: Axial CT images were obtained of the head without contrast administration.  Reconstructed coronal and sagittal images were also obtained. Automated exposure control and iterative construction methods were used. Findings: There is no evidence of acute infarction. Mild encephalomalacia in the left temporal lobe is noted unchanged. There there is no acute intracranial hemorrhage. There are no extra-axial collections. Ventricles and CSF spaces are symmetric. No mass effect nor hydrocephalus. Arachnoid cyst is noted in the right cerebellar fossa. Brain parenchyma appears normal for age.  Paranasal sinuses and mastoid air cells are adequately aerated.  Osseous structures and orbits appear intact.     Impression: 1. Stable examination without acute intracranial process. Electronically Signed: Mary Jo Ponce MD  3/3/2025 1:48 PM EST  Workstation ID: [id number]    XR Chest 1 View    Result Date: 3/3/2025  XR CHEST 1 VW Date of Exam: 3/3/2025 10:03 AM EST Indication: Weak/Dizzy/AMS triage protocol Comparison: None available. Findings: Cardiomediastinal silhouette is unremarkable.  No airspace disease, pneumothorax, nor pleural effusion. No acute osseous abnormality identified.     Impression: No acute process identified Electronically Signed: Marcio Ponce MD  3/3/2025 10:05 AM EST  Workstation ID: [id number]       Differential Diagnosis and  Discussion:    Dizziness: Based on the patient's history, signs, and symptoms, the diffential diagnosis includes but is not limited to meningitis, stroke, sepsis, subarachnoid hemorrhage, intracranial bleeding, encephalitis, vertigo, electrolyte imbalance, and metabolic disorders.    PROCEDURES:    Labs were collected in the emergency department and all labs were reviewed and interpreted by me.  X-ray were performed in the emergency department and all X-ray impressions were independently interpreted by me.  An EKG was performed and the EKG was interpreted by me.  CT scan was performed in the emergency department and the CT scan radiology impression was interpreted by me.    ECG 12 Lead ED Triage Standing Order; Weak / Dizzy / AMS   Preliminary Result   HEART RATE=73  bpm   RR Bakqwxhr=161  ms   TN Xsvtsdup=166  ms   P Horizontal Axis=27  deg   P Front Axis=4  deg   QRSD Interval=92  ms   QT Bseoqssd=954  ms   GXrR=350  ms   QRS Axis=15  deg   T Wave Axis=26  deg   - NORMAL ECG -   Sinus rhythm   ST elev, probable normal early repol pattern   Date and Time of Study:2025-03-03 10:22:27          Procedures    MDM     Amount and/or Complexity of Data Reviewed  Clinical lab tests: reviewed  Tests in the radiology section of CPT®: reviewed  Tests in the medicine section of CPT®: reviewed  Decide to obtain previous medical records or to obtain history from someone other than the patient: yes         Patient is a 45-year-old gentleman with a history of alcohol abuse who presents with complaints of dizziness as well as just not feeling well.  He reports that he possibly was seeing some bugs as well as felt lightheaded.  Was given Ativan here with improvement in his symptoms.  Did report he stopped drinking several days ago.  He does not have any other signs of DTs at this time.  Head CT is unremarkable.  His liver enzymes are elevated consistent with alcohol abuse.  I did state that he needs to stop drinking as well as  follow-up with GI.  At this time he states he feels much better.  Will DC and have the patient follow-up as outpatient.              Patient Care Considerations:          Consultants/Shared Management Plan:    None    Social Determinants of Health:    Patient is independent, reliable, and has access to care.       Disposition and Care Coordination:    Discharged: The patient is suitable and stable for discharge with no need for consideration of admission.    I have explained the patient´s condition, diagnoses and treatment plan based on the information available to me at this time. I have answered questions and addressed any concerns. The patient has a good  understanding of the patient´s diagnosis, condition, and treatment plan as can be expected at this point. The vital signs have been stable. The patient´s condition is stable and appropriate for discharge from the emergency department.      The patient will pursue further outpatient evaluation with the primary care physician or other designated or consulting physician as outlined in the discharge instructions. They are agreeable to this plan of care and follow-up instructions have been explained in detail. The patient has received these instructions in written format and have expressed an understanding of the discharge instructions. The patient is aware that any significant change in condition or worsening of symptoms should prompt an immediate return to this or the closest emergency department or call to 911.      Final diagnoses:   Dizziness   Elevated liver enzymes   Alcohol abuse        ED Disposition       ED Disposition   Discharge    Condition   Stable    Comment   --               This medical record created using voice recognition software.             Brent Fournier MD  03/03/25 [id number]

## 2025-03-03 NOTE — ED NOTES
"Pt reports that his blurry vision makes him see \"little kids running away from him\" when he walks into a dark room.   "

## 2025-03-03 NOTE — DISCHARGE INSTRUCTIONS
Please refrain from using alcohol.  Follow-up with your primary care doctor in the next few days.  Should you have new or worsening symptoms return the emergency room.

## 2025-03-19 LAB
QT INTERVAL: 414 MS
QTC INTERVAL: 456 MS

## 2025-07-18 ENCOUNTER — HOSPITAL ENCOUNTER (EMERGENCY)
Facility: HOSPITAL | Age: 45
Discharge: HOME | End: 2025-07-18
Attending: EMERGENCY MEDICINE
Payer: OTHER GOVERNMENT

## 2025-07-18 VITALS
HEIGHT: 72 IN | HEART RATE: 98 BPM | SYSTOLIC BLOOD PRESSURE: 114 MMHG | DIASTOLIC BLOOD PRESSURE: 70 MMHG | RESPIRATION RATE: 18 BRPM | OXYGEN SATURATION: 100 % | BODY MASS INDEX: 33.05 KG/M2 | TEMPERATURE: 98.5 F | WEIGHT: 244 LBS

## 2025-07-18 DIAGNOSIS — F10.929 ALCOHOLIC INTOXICATION WITH COMPLICATION: Primary | ICD-10-CM

## 2025-07-18 LAB
ALBUMIN SERPL-MCNC: 4 G/DL (ref 3.5–5.2)
ALBUMIN/GLOB SERPL: 1.6 G/DL
ALP SERPL-CCNC: 59 U/L (ref 39–117)
ALT SERPL W P-5'-P-CCNC: 23 U/L (ref 1–41)
ANION GAP SERPL CALCULATED.3IONS-SCNC: 12.7 MMOL/L (ref 5–15)
AST SERPL-CCNC: 30 U/L (ref 1–40)
BASOPHILS # BLD AUTO: 0.03 10*3/MM3 (ref 0–0.2)
BASOPHILS NFR BLD AUTO: 0.6 % (ref 0–1.5)
BILIRUB SERPL-MCNC: 0.2 MG/DL (ref 0–1.2)
BUN SERPL-MCNC: 25.5 MG/DL (ref 6–20)
BUN/CREAT SERPL: 22.2 (ref 7–25)
CALCIUM SPEC-SCNC: 8.2 MG/DL (ref 8.6–10.5)
CHLORIDE SERPL-SCNC: 106 MMOL/L (ref 98–107)
CO2 SERPL-SCNC: 21.3 MMOL/L (ref 22–29)
CREAT SERPL-MCNC: 1.15 MG/DL (ref 0.76–1.27)
DEPRECATED RDW RBC AUTO: 41 FL (ref 37–54)
EGFRCR SERPLBLD CKD-EPI 2021: 79.5 ML/MIN/1.73
EOSINOPHIL # BLD AUTO: 0.1 10*3/MM3 (ref 0–0.4)
EOSINOPHIL NFR BLD AUTO: 2 % (ref 0.3–6.2)
ERYTHROCYTE [DISTWIDTH] IN BLOOD BY AUTOMATED COUNT: 11.8 % (ref 12.3–15.4)
ETHANOL BLD-MCNC: 356 MG/DL (ref 0–10)
ETHANOL UR QL: 0.36 %
GEN 5 1HR TROPONIN T REFLEX: 12 NG/L
GLOBULIN UR ELPH-MCNC: 2.5 GM/DL
GLUCOSE SERPL-MCNC: 102 MG/DL (ref 65–99)
HCT VFR BLD AUTO: 35.5 % (ref 37.5–51)
HGB BLD-MCNC: 12.3 G/DL (ref 13–17.7)
HOLD SPECIMEN: NORMAL
IMM GRANULOCYTES # BLD AUTO: 0.01 10*3/MM3 (ref 0–0.05)
IMM GRANULOCYTES NFR BLD AUTO: 0.2 % (ref 0–0.5)
LIPASE SERPL-CCNC: 31 U/L (ref 13–60)
LYMPHOCYTES # BLD AUTO: 2.3 10*3/MM3 (ref 0.7–3.1)
LYMPHOCYTES NFR BLD AUTO: 45.5 % (ref 19.6–45.3)
MCH RBC QN AUTO: 33 PG (ref 26.6–33)
MCHC RBC AUTO-ENTMCNC: 34.6 G/DL (ref 31.5–35.7)
MCV RBC AUTO: 95.2 FL (ref 79–97)
MONOCYTES # BLD AUTO: 0.52 10*3/MM3 (ref 0.1–0.9)
MONOCYTES NFR BLD AUTO: 10.3 % (ref 5–12)
NEUTROPHILS NFR BLD AUTO: 2.1 10*3/MM3 (ref 1.7–7)
NEUTROPHILS NFR BLD AUTO: 41.4 % (ref 42.7–76)
NRBC BLD AUTO-RTO: 0 /100 WBC (ref 0–0.2)
PLATELET # BLD AUTO: 152 10*3/MM3 (ref 140–450)
PMV BLD AUTO: 10.9 FL (ref 6–12)
POTASSIUM SERPL-SCNC: 4.1 MMOL/L (ref 3.5–5.2)
PROT SERPL-MCNC: 6.5 G/DL (ref 6–8.5)
QT INTERVAL: 337 MS
QTC INTERVAL: 439 MS
RBC # BLD AUTO: 3.73 10*6/MM3 (ref 4.14–5.8)
SODIUM SERPL-SCNC: 140 MMOL/L (ref 136–145)
TROPONIN T NUMERIC DELTA: 0 NG/L
TROPONIN T SERPL HS-MCNC: 12 NG/L
WBC NRBC COR # BLD AUTO: 5.06 10*3/MM3 (ref 3.4–10.8)
WHOLE BLOOD HOLD COAG: NORMAL

## 2025-07-18 PROCEDURE — 80053 COMPREHEN METABOLIC PANEL: CPT | Performed by: EMERGENCY MEDICINE

## 2025-07-18 PROCEDURE — 93005 ELECTROCARDIOGRAM TRACING: CPT | Performed by: EMERGENCY MEDICINE

## 2025-07-18 PROCEDURE — 93010 ELECTROCARDIOGRAM REPORT: CPT | Performed by: INTERNAL MEDICINE

## 2025-07-18 PROCEDURE — 82077 ASSAY SPEC XCP UR&BREATH IA: CPT | Performed by: EMERGENCY MEDICINE

## 2025-07-18 PROCEDURE — 99283 EMERGENCY DEPT VISIT LOW MDM: CPT

## 2025-07-18 PROCEDURE — 85025 COMPLETE CBC W/AUTO DIFF WBC: CPT | Performed by: EMERGENCY MEDICINE

## 2025-07-18 PROCEDURE — 84484 ASSAY OF TROPONIN QUANT: CPT | Performed by: EMERGENCY MEDICINE

## 2025-07-18 PROCEDURE — 36415 COLL VENOUS BLD VENIPUNCTURE: CPT

## 2025-07-18 PROCEDURE — 83690 ASSAY OF LIPASE: CPT | Performed by: EMERGENCY MEDICINE

## 2025-07-18 NOTE — ED PROVIDER NOTES
Time: 3:47 AM EDT  Date of encounter:  7/18/2025  Independent Historian/Clinical History and Information was obtained by:   Patient    History is limited by: N/A    Chief Complaint: abdominal pain      History of Present Illness:  Patient is a 45 y.o. year old male who presents to the emergency department for evaluation of Upper abdominal pain.  Patient states he had severe onset of pain described as squeezing pain.  No chest pain or shortness of breath.  No nausea vomiting.  No diarrhea.  Patient does admit to drinking alcohol earlier.  Pain is since significantly improved.  Denies any history of abdominal surgeries.      Patient Care Team  Primary Care Provider: Provider, Katherin Known    Past Medical History:     No Known Allergies  Past Medical History:   Diagnosis Date    A-fib     Head injury 1998    Headache, tension-type     Hypertension     PTSD (post-traumatic stress disorder)     Sleep apnea     Stroke     TIA (transient ischemic attack)     Tinnitus      Past Surgical History:   Procedure Laterality Date    ENDOSCOPY N/A 12/14/2024    Procedure: ESOPHAGOGASTRODUODENOSCOPY WITH  POLYP BIOPSY AND APC;  Surgeon: Javier Ramirez MD;  Location: [facility] OR [facility] [place];  Service: Gastroenterology;  Laterality: N/A;  AMPULLA SWELLING, AMPULLARY MASS    WISDOM TOOTH EXTRACTION       History reviewed. No pertinent family history.    Home Medications:  Prior to Admission medications    Medication Sig Start Date End Date Taking? Authorizing Provider   Cholecalciferol 25 MCG (1000 UT) tablet Take 2 tablets by mouth Daily.    Wilfred Benoit MD   lisinopril (PRINIVIL,ZESTRIL) 20 MG tablet Take 1 tablet by mouth Daily.    Wilfred Benoit MD   Metoprolol Succinate 50 MG capsule extended-release 24 hour sprinkle Take 1 tablet by mouth Daily.    Wilfred Benoit MD   Multi Vitamin tablet tablet Take 1 tablet by mouth Daily.    Wilfred Benoit MD   omeprazole (priLOSEC) 20 MG capsule Take 1 capsule by mouth  "Daily.    Provider, Historical, MD        Social History:   Social History     Tobacco Use    Smoking status: Never     Passive exposure: Past    Smokeless tobacco: Current     Types: Snuff   Vaping Use    Vaping status: Never Used   Substance Use Topics    Alcohol use: Yes     Alcohol/week: 40.0 standard drinks of alcohol     Types: 40 Cans of beer per week    Drug use: Not Currently         Review of Systems:  Review of Systems   Constitutional:  Negative for chills and fever.   HENT:  Negative for congestion, ear pain and sore throat.    Eyes:  Negative for pain.   Respiratory:  Negative for cough, chest tightness and shortness of breath.    Cardiovascular:  Negative for chest pain.   Gastrointestinal:  Positive for abdominal pain. Negative for diarrhea, nausea and vomiting.   Genitourinary:  Negative for flank pain and hematuria.   Musculoskeletal:  Negative for joint swelling.   Skin:  Negative for pallor.   Neurological:  Negative for seizures and headaches.   All other systems reviewed and are negative.       Physical Exam:  /70   Pulse 98   Temp 98.5 °F (36.9 °C) (Oral)   Resp 18   Ht 182.9 cm (72\")   Wt 111 kg (244 lb)   SpO2 100%   BMI 33.09 kg/m²     Physical Exam  Constitutional:       Appearance: Normal appearance.   HENT:      Head: Normocephalic and atraumatic.      Nose: Nose normal.      Mouth/Throat:      Mouth: Mucous membranes are moist.   Eyes:      Extraocular Movements: Extraocular movements intact.      Conjunctiva/sclera: Conjunctivae normal.      Pupils: Pupils are equal, round, and reactive to light.   Cardiovascular:      Rate and Rhythm: Normal rate and regular rhythm.      Pulses: Normal pulses.      Heart sounds: Normal heart sounds.   Pulmonary:      Effort: Pulmonary effort is normal.      Breath sounds: Normal breath sounds.   Abdominal:      General: There is no distension.      Palpations: Abdomen is soft.      Tenderness: There is no abdominal tenderness. "   Musculoskeletal:         General: Normal range of motion.      Cervical back: Normal range of motion.   Skin:     General: Skin is warm and dry.      Capillary Refill: Capillary refill takes less than 2 seconds.   Neurological:      General: No focal deficit present.      Mental Status: He is alert and oriented to person, place, and time. Mental status is at baseline.   Psychiatric:         Mood and Affect: Mood normal.         Behavior: Behavior normal.                    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  No acute ST elevation.  Normal NC and QTc.  EKG interpreted by me [LD]      ED Course User Index  [LD] Gregoria Olivera MD       Labs:    Lab Results (last 24 hours)       ** No results found for the last 24 hours. **             Imaging:    No Radiology Exams Resulted Within Past 24 Hours      Differential Diagnosis and Discussion:    Abdominal Pain: Based on the patient's signs and symptoms, I considered abdominal aortic aneurysm, small bowel obstruction, pancreatitis, acute cholecystitis, acute appendecitis, peptic ulcer disease, gastritis, colitis,  endocrine disorders, irritable bowel syndrome and other differential diagnosis an etiology of the patient's abdominal pain.    PROCEDURES:    Labs were collected in the emergency department and all labs were reviewed and interpreted by me.  X-ray were performed in the emergency department and all X-ray impressions were independently interpreted by me.  An EKG was performed and the EKG was interpreted by me.    ECG 12 Lead Chest Pain   Final Result   HEART BWLX=857  bpm   RR Ekkowttp=136  ms   OR Focpdike=503  ms   P Horizontal Axis=18  deg   P Front Axis=40  deg   QRSD Interval=95  ms   QT Zexfjyem=792  ms   OKlS=140  ms   QRS Axis=47  deg   T Wave Axis=38  deg   - OTHERWISE NORMAL ECG -   Sinus tachycardia   When compared with ECG of 03-Mar-2025 10:22:27,   Significant rate increase   Significant repolarization change   Electronically Signed By: Kojo Brush (Banner) 2025-07-18 12:19:02   Date and Time of Study:2025-07-18 03:57:26          Procedures    MDM  Number of Diagnoses or Management Options  Alcoholic intoxication with complication  Diagnosis management comments: Patient is afebrile and nontoxic-appearing.  Vital signs remarkable for tachycardia.  Patient appears intoxicated.  He states he only had a few beers but it was yesterday.  Patient reports abdominal pain earlier this is since resolved.  No abdominal tenderness on exam.  Patient denies SI/HI.  Patient will be discharged once clinically sober or with a responsible adult.                       Patient Care Considerations:    PSYCH: I considered ordering anxiolytic and or antipsychotic medications, however patient was able to facilitate the medical screening exam and disposition without further medications.      Consultants/Shared Management Plan:    None    Social Determinants of Health:    Patient is independent, reliable, and has access to care.       Disposition and Care Coordination:    Discharged: The patient is suitable and stable for discharge  with no need for consideration of admission.    I have explained the patient´s condition, diagnoses and treatment plan based on the information available to me at this time. I have answered questions and addressed any concerns. The patient has a good  understanding of the patient´s diagnosis, condition, and treatment plan as can be expected at this point. The vital signs have been stable. The patient´s condition is stable and appropriate for discharge from the emergency department.      The patient will pursue further outpatient evaluation with the primary care physician or other designated or consulting physician as outlined in the discharge instructions. They are agreeable to this plan of care and follow-up instructions have been explained in detail. The patient has received these instructions in written format and has expressed an understanding of the discharge instructions. The patient is aware that any significant change in condition or worsening of symptoms should prompt an immediate return to this or the closest emergency department or call to 911.  I have explained discharge medications and the need for follow up with the patient/caretakers. This was also printed in the discharge instructions. Patient was discharged with the following medications and follow up:      Medication List      No changes were made to your prescriptions during this visit.      Provider, No Known  [facility] [place] [address]    In 2 days         Final diagnoses:   Alcoholic intoxication with complication        ED Disposition       ED Disposition   Discharge    Condition   Stable    Comment   --               This medical record created using voice recognition software.             Gregoria Olivera MD  07/20/25 1855

## 2025-07-18 NOTE — ED TRIAGE NOTES
"Pt reports feeling anxious after having 5-6 beers today, pt reports feeling a \" banging all over through his body.\"  Brought in by EMS with tingling throughout body. Has 20 g R ac with NS going   "

## 2025-07-18 NOTE — ED NOTES
Pt here from home by EMS for stroke like symptoms, EMS reports neg for stroke. Pt had 5-6 beers tonight having tingling in both arms. 20 g R ac.

## 2025-07-18 NOTE — ED NOTES
Pt has called his ride and was informed we need to see his ride here before he can be DC. Pt is okay with that and waiting in his room

## 2025-07-31 ENCOUNTER — APPOINTMENT (OUTPATIENT)
Dept: GENERAL RADIOLOGY | Facility: HOSPITAL | Age: 45
DRG: 682 | End: 2025-07-31
Payer: OTHER GOVERNMENT

## 2025-07-31 ENCOUNTER — HOSPITAL ENCOUNTER (INPATIENT)
Facility: HOSPITAL | Age: 45
LOS: 3 days | Discharge: HOME | DRG: 682 | End: 2025-08-03
Attending: EMERGENCY MEDICINE | Admitting: INTERNAL MEDICINE
Payer: OTHER GOVERNMENT

## 2025-07-31 DIAGNOSIS — S06.5X0A SUBDURAL HEMATOMA WITHOUT COMA, WITHOUT LOSS OF CONSCIOUSNESS, INITIAL ENCOUNTER: ICD-10-CM

## 2025-07-31 DIAGNOSIS — R55 SYNCOPE, UNSPECIFIED SYNCOPE TYPE: Primary | ICD-10-CM

## 2025-07-31 DIAGNOSIS — R94.31 ABNORMAL ECG: ICD-10-CM

## 2025-07-31 PROBLEM — I21.3 STEMI (ST ELEVATION MYOCARDIAL INFARCTION): Status: ACTIVE | Noted: 2025-07-31

## 2025-07-31 PROBLEM — I21.3 STEMI (ST ELEVATION MYOCARDIAL INFARCTION): Status: RESOLVED | Noted: 2025-07-31 | Resolved: 2025-07-31

## 2025-07-31 LAB
ALBUMIN SERPL-MCNC: 4.1 G/DL (ref 3.5–5.2)
ALBUMIN/GLOB SERPL: 1.5 G/DL
ALP SERPL-CCNC: 82 U/L (ref 39–117)
ALT SERPL W P-5'-P-CCNC: 160 U/L (ref 1–41)
ANION GAP SERPL CALCULATED.3IONS-SCNC: 15.1 MMOL/L (ref 5–15)
AST SERPL-CCNC: 64 U/L (ref 1–40)
BASOPHILS # BLD AUTO: 0.02 10*3/MM3 (ref 0–0.2)
BASOPHILS NFR BLD AUTO: 0.5 % (ref 0–1.5)
BILIRUB SERPL-MCNC: 0.7 MG/DL (ref 0–1.2)
BUN SERPL-MCNC: 24.4 MG/DL (ref 6–20)
BUN/CREAT SERPL: 9.8 (ref 7–25)
CALCIUM SPEC-SCNC: 9 MG/DL (ref 8.6–10.5)
CHLORIDE SERPL-SCNC: 94 MMOL/L (ref 98–107)
CO2 SERPL-SCNC: 22.9 MMOL/L (ref 22–29)
CREAT SERPL-MCNC: 2.48 MG/DL (ref 0.76–1.27)
DEPRECATED RDW RBC AUTO: 40.7 FL (ref 37–54)
EGFRCR SERPLBLD CKD-EPI 2021: 31.6 ML/MIN/1.73
EOSINOPHIL # BLD AUTO: 0.09 10*3/MM3 (ref 0–0.4)
EOSINOPHIL NFR BLD AUTO: 2.2 % (ref 0.3–6.2)
ERYTHROCYTE [DISTWIDTH] IN BLOOD BY AUTOMATED COUNT: 12 % (ref 12.3–15.4)
ETHANOL BLD-MCNC: 356 MG/DL (ref 0–10)
ETHANOL UR QL: 0.36 %
GLOBULIN UR ELPH-MCNC: 2.7 GM/DL
GLUCOSE SERPL-MCNC: 129 MG/DL (ref 65–99)
HCT VFR BLD AUTO: 33.9 % (ref 37.5–51)
HGB BLD-MCNC: 11.9 G/DL (ref 13–17.7)
HOLD SPECIMEN: NORMAL
HOLD SPECIMEN: NORMAL
IMM GRANULOCYTES # BLD AUTO: 0 10*3/MM3 (ref 0–0.05)
IMM GRANULOCYTES NFR BLD AUTO: 0 % (ref 0–0.5)
LIPASE SERPL-CCNC: 31 U/L (ref 13–60)
LYMPHOCYTES # BLD AUTO: 2 10*3/MM3 (ref 0.7–3.1)
LYMPHOCYTES NFR BLD AUTO: 47.8 % (ref 19.6–45.3)
MAGNESIUM SERPL-MCNC: 1.6 MG/DL (ref 1.6–2.6)
MCH RBC QN AUTO: 32.4 PG (ref 26.6–33)
MCHC RBC AUTO-ENTMCNC: 35.1 G/DL (ref 31.5–35.7)
MCV RBC AUTO: 92.4 FL (ref 79–97)
MONOCYTES # BLD AUTO: 0.29 10*3/MM3 (ref 0.1–0.9)
MONOCYTES NFR BLD AUTO: 6.9 % (ref 5–12)
NEUTROPHILS NFR BLD AUTO: 1.78 10*3/MM3 (ref 1.7–7)
NEUTROPHILS NFR BLD AUTO: 42.6 % (ref 42.7–76)
NRBC BLD AUTO-RTO: 0 /100 WBC (ref 0–0.2)
NT-PROBNP SERPL-MCNC: <36 PG/ML (ref 0–450)
PLATELET # BLD AUTO: 108 10*3/MM3 (ref 140–450)
PMV BLD AUTO: 11 FL (ref 6–12)
POTASSIUM SERPL-SCNC: 3.6 MMOL/L (ref 3.5–5.2)
PROT SERPL-MCNC: 6.8 G/DL (ref 6–8.5)
QT INTERVAL: 392 MS
QTC INTERVAL: 423 MS
RBC # BLD AUTO: 3.67 10*6/MM3 (ref 4.14–5.8)
SODIUM SERPL-SCNC: 132 MMOL/L (ref 136–145)
TROPONIN T SERPL HS-MCNC: 13 NG/L
WBC NRBC COR # BLD AUTO: 4.18 10*3/MM3 (ref 3.4–10.8)
WHOLE BLOOD HOLD COAG: NORMAL
WHOLE BLOOD HOLD SPECIMEN: NORMAL

## 2025-07-31 PROCEDURE — 83735 ASSAY OF MAGNESIUM: CPT | Performed by: EMERGENCY MEDICINE

## 2025-07-31 PROCEDURE — 83690 ASSAY OF LIPASE: CPT | Performed by: EMERGENCY MEDICINE

## 2025-07-31 PROCEDURE — 82077 ASSAY SPEC XCP UR&BREATH IA: CPT | Performed by: EMERGENCY MEDICINE

## 2025-07-31 PROCEDURE — 83880 ASSAY OF NATRIURETIC PEPTIDE: CPT | Performed by: EMERGENCY MEDICINE

## 2025-07-31 PROCEDURE — 71045 X-RAY EXAM CHEST 1 VIEW: CPT

## 2025-07-31 PROCEDURE — 25010000002 HEPARIN (PORCINE) PER 1000 UNITS: Performed by: INTERNAL MEDICINE

## 2025-07-31 PROCEDURE — 25010000002 MIDAZOLAM PER 1MG: Performed by: INTERNAL MEDICINE

## 2025-07-31 PROCEDURE — 93005 ELECTROCARDIOGRAM TRACING: CPT | Performed by: EMERGENCY MEDICINE

## 2025-07-31 PROCEDURE — C1769 GUIDE WIRE: HCPCS | Performed by: INTERNAL MEDICINE

## 2025-07-31 PROCEDURE — 25010000002 LIDOCAINE 2% SOLUTION: Performed by: INTERNAL MEDICINE

## 2025-07-31 PROCEDURE — 93458 L HRT ARTERY/VENTRICLE ANGIO: CPT | Performed by: INTERNAL MEDICINE

## 2025-07-31 PROCEDURE — 99285 EMERGENCY DEPT VISIT HI MDM: CPT

## 2025-07-31 PROCEDURE — 93005 ELECTROCARDIOGRAM TRACING: CPT

## 2025-07-31 PROCEDURE — 25010000002 HEPARIN LOCK FLUSH PER 10 UNITS: Performed by: EMERGENCY MEDICINE

## 2025-07-31 PROCEDURE — 25010000002 FENTANYL CITRATE (PF) 100 MCG/2ML SOLUTION: Performed by: INTERNAL MEDICINE

## 2025-07-31 PROCEDURE — 25810000003 SODIUM CHLORIDE 0.9 % SOLUTION: Performed by: EMERGENCY MEDICINE

## 2025-07-31 PROCEDURE — 99222 1ST HOSP IP/OBS MODERATE 55: CPT | Performed by: INTERNAL MEDICINE

## 2025-07-31 PROCEDURE — C1894 INTRO/SHEATH, NON-LASER: HCPCS | Performed by: INTERNAL MEDICINE

## 2025-07-31 PROCEDURE — 25510000001 IOPAMIDOL PER 1 ML: Performed by: INTERNAL MEDICINE

## 2025-07-31 PROCEDURE — 84484 ASSAY OF TROPONIN QUANT: CPT | Performed by: EMERGENCY MEDICINE

## 2025-07-31 PROCEDURE — 80053 COMPREHEN METABOLIC PANEL: CPT | Performed by: EMERGENCY MEDICINE

## 2025-07-31 PROCEDURE — 99253 IP/OBS CNSLTJ NEW/EST LOW 45: CPT | Performed by: FAMILY MEDICINE

## 2025-07-31 PROCEDURE — 85025 COMPLETE CBC W/AUTO DIFF WBC: CPT | Performed by: EMERGENCY MEDICINE

## 2025-07-31 RX ORDER — SODIUM CHLORIDE 9 MG/ML
100 INJECTION, SOLUTION INTRAVENOUS CONTINUOUS
Status: DISCONTINUED | OUTPATIENT
Start: 2025-08-01 | End: 2025-08-01

## 2025-07-31 RX ORDER — HEPARIN SODIUM 5000 [USP'U]/ML
4000 INJECTION, SOLUTION INTRAVENOUS; SUBCUTANEOUS ONCE
Status: DISCONTINUED | OUTPATIENT
Start: 2025-07-31 | End: 2025-07-31

## 2025-07-31 RX ORDER — AMOXICILLIN 250 MG
2 CAPSULE ORAL 2 TIMES DAILY
Status: DISCONTINUED | OUTPATIENT
Start: 2025-08-01 | End: 2025-08-03 | Stop reason: HOSPADM

## 2025-07-31 RX ORDER — SODIUM CHLORIDE 0.9 % (FLUSH) 0.9 %
10 SYRINGE (ML) INJECTION AS NEEDED
Status: DISCONTINUED | OUTPATIENT
Start: 2025-07-31 | End: 2025-08-03 | Stop reason: HOSPADM

## 2025-07-31 RX ORDER — HEPARIN SODIUM 5000 [USP'U]/ML
5000 INJECTION, SOLUTION INTRAVENOUS; SUBCUTANEOUS EVERY 12 HOURS SCHEDULED
Status: DISCONTINUED | OUTPATIENT
Start: 2025-08-01 | End: 2025-08-01

## 2025-07-31 RX ORDER — IBUPROFEN 400 MG/1
400 TABLET, FILM COATED ORAL EVERY 4 HOURS PRN
Status: DISCONTINUED | OUTPATIENT
Start: 2025-07-31 | End: 2025-08-03 | Stop reason: HOSPADM

## 2025-07-31 RX ORDER — ONDANSETRON 2 MG/ML
4 INJECTION INTRAMUSCULAR; INTRAVENOUS EVERY 6 HOURS PRN
Status: DISCONTINUED | OUTPATIENT
Start: 2025-07-31 | End: 2025-08-03 | Stop reason: HOSPADM

## 2025-07-31 RX ORDER — NITROGLYCERIN 0.4 MG/1
0.4 TABLET SUBLINGUAL
Status: DISCONTINUED | OUTPATIENT
Start: 2025-07-31 | End: 2025-08-01

## 2025-07-31 RX ORDER — ACETAMINOPHEN 325 MG/1
650 TABLET ORAL EVERY 4 HOURS PRN
Status: DISCONTINUED | OUTPATIENT
Start: 2025-07-31 | End: 2025-08-03 | Stop reason: HOSPADM

## 2025-07-31 RX ORDER — HEPARIN SODIUM (PORCINE) LOCK FLUSH IV SOLN 100 UNIT/ML 100 UNIT/ML
SOLUTION INTRAVENOUS
Status: COMPLETED | OUTPATIENT
Start: 2025-07-31 | End: 2025-07-31

## 2025-07-31 RX ORDER — ASPIRIN 81 MG/1
324 TABLET, CHEWABLE ORAL ONCE
Status: DISCONTINUED | OUTPATIENT
Start: 2025-07-31 | End: 2025-08-01

## 2025-07-31 RX ORDER — POLYETHYLENE GLYCOL 3350 17 G/17G
17 POWDER, FOR SOLUTION ORAL DAILY PRN
Status: DISCONTINUED | OUTPATIENT
Start: 2025-07-31 | End: 2025-08-03 | Stop reason: HOSPADM

## 2025-07-31 RX ORDER — BISACODYL 10 MG
10 SUPPOSITORY, RECTAL RECTAL DAILY PRN
Status: DISCONTINUED | OUTPATIENT
Start: 2025-07-31 | End: 2025-08-03 | Stop reason: HOSPADM

## 2025-07-31 RX ORDER — MIDAZOLAM HYDROCHLORIDE 2 MG/2ML
INJECTION, SOLUTION INTRAMUSCULAR; INTRAVENOUS
Status: DISCONTINUED | OUTPATIENT
Start: 2025-07-31 | End: 2025-07-31 | Stop reason: HOSPADM

## 2025-07-31 RX ORDER — LIDOCAINE HYDROCHLORIDE 20 MG/ML
INJECTION, SOLUTION INFILTRATION; PERINEURAL
Status: DISCONTINUED | OUTPATIENT
Start: 2025-07-31 | End: 2025-07-31 | Stop reason: HOSPADM

## 2025-07-31 RX ORDER — NITROGLYCERIN 0.4 MG/1
0.4 TABLET SUBLINGUAL
Status: DISCONTINUED | OUTPATIENT
Start: 2025-07-31 | End: 2025-08-03 | Stop reason: HOSPADM

## 2025-07-31 RX ORDER — BUPIVACAINE HCL/0.9 % NACL/PF 0.125 %
PLASTIC BAG, INJECTION (ML) EPIDURAL
Status: DISCONTINUED | OUTPATIENT
Start: 2025-07-31 | End: 2025-07-31 | Stop reason: HOSPADM

## 2025-07-31 RX ORDER — NOREPINEPHRINE BITARTRATE 0.03 MG/ML
INJECTION, SOLUTION INTRAVENOUS
Status: COMPLETED | OUTPATIENT
Start: 2025-07-31 | End: 2025-07-31

## 2025-07-31 RX ORDER — FENTANYL CITRATE 50 UG/ML
INJECTION, SOLUTION INTRAMUSCULAR; INTRAVENOUS
Status: DISCONTINUED | OUTPATIENT
Start: 2025-07-31 | End: 2025-07-31 | Stop reason: HOSPADM

## 2025-07-31 RX ORDER — BISACODYL 5 MG/1
5 TABLET, DELAYED RELEASE ORAL DAILY PRN
Status: DISCONTINUED | OUTPATIENT
Start: 2025-07-31 | End: 2025-08-03 | Stop reason: HOSPADM

## 2025-07-31 RX ORDER — SODIUM CHLORIDE 0.9 % (FLUSH) 0.9 %
10 SYRINGE (ML) INJECTION EVERY 12 HOURS SCHEDULED
Status: DISCONTINUED | OUTPATIENT
Start: 2025-08-01 | End: 2025-08-03 | Stop reason: HOSPADM

## 2025-07-31 RX ORDER — VERAPAMIL HYDROCHLORIDE 2.5 MG/ML
INJECTION INTRAVENOUS
Status: DISCONTINUED | OUTPATIENT
Start: 2025-07-31 | End: 2025-07-31 | Stop reason: HOSPADM

## 2025-07-31 RX ORDER — HEPARIN SODIUM 1000 [USP'U]/ML
INJECTION, SOLUTION INTRAVENOUS; SUBCUTANEOUS
Status: DISCONTINUED | OUTPATIENT
Start: 2025-07-31 | End: 2025-07-31 | Stop reason: HOSPADM

## 2025-07-31 RX ORDER — IOPAMIDOL 755 MG/ML
INJECTION, SOLUTION INTRAVASCULAR
Status: DISCONTINUED | OUTPATIENT
Start: 2025-07-31 | End: 2025-07-31 | Stop reason: HOSPADM

## 2025-07-31 RX ORDER — SODIUM CHLORIDE 9 MG/ML
40 INJECTION, SOLUTION INTRAVENOUS AS NEEDED
Status: DISCONTINUED | OUTPATIENT
Start: 2025-07-31 | End: 2025-08-03 | Stop reason: HOSPADM

## 2025-07-31 RX ADMIN — SODIUM CHLORIDE 1000 ML: 9 INJECTION, SOLUTION INTRAVENOUS at 21:48

## 2025-07-31 RX ADMIN — SODIUM CHLORIDE 1000 ML: 9 INJECTION, SOLUTION INTRAVENOUS at 22:04

## 2025-07-31 RX ADMIN — HEPARIN 5000 UNITS: 100 SYRINGE at 21:56

## 2025-08-01 ENCOUNTER — APPOINTMENT (OUTPATIENT)
Dept: CT IMAGING | Facility: HOSPITAL | Age: 45
DRG: 682 | End: 2025-08-01
Payer: OTHER GOVERNMENT

## 2025-08-01 ENCOUNTER — APPOINTMENT (OUTPATIENT)
Dept: CARDIOLOGY | Facility: HOSPITAL | Age: 45
DRG: 682 | End: 2025-08-01
Payer: OTHER GOVERNMENT

## 2025-08-01 ENCOUNTER — APPOINTMENT (OUTPATIENT)
Facility: HOSPITAL | Age: 45
DRG: 682 | End: 2025-08-01
Payer: OTHER GOVERNMENT

## 2025-08-01 PROBLEM — N17.9 AKI (ACUTE KIDNEY INJURY): Status: ACTIVE | Noted: 2025-08-01

## 2025-08-01 PROBLEM — R57.9 SHOCK: Status: ACTIVE | Noted: 2025-08-01

## 2025-08-01 PROBLEM — F10.10 ALCOHOL ABUSE: Status: ACTIVE | Noted: 2025-08-01

## 2025-08-01 PROBLEM — R94.31 ABNORMAL ECG: Status: ACTIVE | Noted: 2025-08-01

## 2025-08-01 PROBLEM — E86.0 DEHYDRATION: Status: ACTIVE | Noted: 2025-08-01

## 2025-08-01 LAB
AMPHET+METHAMPHET UR QL: NEGATIVE
AMPHETAMINES UR QL: NEGATIVE
ANION GAP SERPL CALCULATED.3IONS-SCNC: 12.7 MMOL/L (ref 5–15)
AORTIC DIMENSIONLESS INDEX: 1.02 (DI)
APTT PPP: 25.5 SECONDS (ref 24.2–34.2)
AV MEAN PRESS GRAD SYS DOP V1V2: 5 MMHG
AV VMAX SYS DOP: 153 CM/SEC
BARBITURATES UR QL SCN: NEGATIVE
BENZODIAZ UR QL SCN: NEGATIVE
BH CV ECHO MEAS - AO MAX PG: 9.4 MMHG
BH CV ECHO MEAS - AO ROOT DIAM: 4 CM
BH CV ECHO MEAS - AO V2 VTI: 27.5 CM
BH CV ECHO MEAS - AVA(I,D): 3.2 CM2
BH CV ECHO MEAS - EDV(CUBED): 74.1 ML
BH CV ECHO MEAS - EDV(MOD-SP2): 67.3 ML
BH CV ECHO MEAS - EDV(MOD-SP4): 100 ML
BH CV ECHO MEAS - EF(MOD-SP2): 66.7 %
BH CV ECHO MEAS - EF(MOD-SP4): 76.3 %
BH CV ECHO MEAS - ESV(CUBED): 13.8 ML
BH CV ECHO MEAS - ESV(MOD-SP2): 22.4 ML
BH CV ECHO MEAS - ESV(MOD-SP4): 23.7 ML
BH CV ECHO MEAS - FS: 42.9 %
BH CV ECHO MEAS - IVS/LVPW: 0.75 CM
BH CV ECHO MEAS - IVSD: 0.9 CM
BH CV ECHO MEAS - LA DIMENSION: 3.4 CM
BH CV ECHO MEAS - LAT PEAK E' VEL: 10.1 CM/SEC
BH CV ECHO MEAS - LV DIASTOLIC VOL/BSA (35-75): 43.1 CM2
BH CV ECHO MEAS - LV MASS(C)D: 147 GRAMS
BH CV ECHO MEAS - LV MAX PG: 9.4 MMHG
BH CV ECHO MEAS - LV MEAN PG: 5 MMHG
BH CV ECHO MEAS - LV SYSTOLIC VOL/BSA (12-30): 10.2 CM2
BH CV ECHO MEAS - LV V1 MAX: 153 CM/SEC
BH CV ECHO MEAS - LV V1 VTI: 28.1 CM
BH CV ECHO MEAS - LVIDD: 4.2 CM
BH CV ECHO MEAS - LVIDS: 2.4 CM
BH CV ECHO MEAS - LVOT AREA: 3.1 CM2
BH CV ECHO MEAS - LVOT DIAM: 2 CM
BH CV ECHO MEAS - LVPWD: 1.2 CM
BH CV ECHO MEAS - MED PEAK E' VEL: 8.5 CM/SEC
BH CV ECHO MEAS - MV A MAX VEL: 107 CM/SEC
BH CV ECHO MEAS - MV DEC SLOPE: 549 CM/SEC2
BH CV ECHO MEAS - MV DEC TIME: 0.17 SEC
BH CV ECHO MEAS - MV E MAX VEL: 94.3 CM/SEC
BH CV ECHO MEAS - MV E/A: 0.88
BH CV ECHO MEAS - MV MEAN PG: 3 MMHG
BH CV ECHO MEAS - MV V2 VTI: 26.1 CM
BH CV ECHO MEAS - MVA(VTI): 3.4 CM2
BH CV ECHO MEAS - RVDD: 2.6 CM
BH CV ECHO MEAS - SV(LVOT): 88.3 ML
BH CV ECHO MEAS - SV(MOD-SP2): 44.9 ML
BH CV ECHO MEAS - SV(MOD-SP4): 76.3 ML
BH CV ECHO MEAS - SVI(LVOT): 38.1 ML/M2
BH CV ECHO MEAS - SVI(MOD-SP2): 19.4 ML/M2
BH CV ECHO MEAS - SVI(MOD-SP4): 32.9 ML/M2
BH CV ECHO MEAS - TAPSE (>1.6): 2.9 CM
BH CV ECHO MEASUREMENTS AVERAGE E/E' RATIO: 10.14
BH CV LOWER VASCULAR LEFT COMMON FEMORAL AUGMENT: NORMAL
BH CV LOWER VASCULAR LEFT COMMON FEMORAL COMPETENT: NORMAL
BH CV LOWER VASCULAR LEFT COMMON FEMORAL COMPRESS: NORMAL
BH CV LOWER VASCULAR LEFT COMMON FEMORAL PHASIC: NORMAL
BH CV LOWER VASCULAR LEFT COMMON FEMORAL SPONT: NORMAL
BH CV LOWER VASCULAR LEFT DISTAL FEMORAL COMPRESS: NORMAL
BH CV LOWER VASCULAR LEFT GASTRONEMIUS COMPRESS: NORMAL
BH CV LOWER VASCULAR LEFT GREATER SAPH AK COMPRESS: NORMAL
BH CV LOWER VASCULAR LEFT GREATER SAPH BK COMPRESS: NORMAL
BH CV LOWER VASCULAR LEFT LESSER SAPH COMPRESS: NORMAL
BH CV LOWER VASCULAR LEFT MID FEMORAL AUGMENT: NORMAL
BH CV LOWER VASCULAR LEFT MID FEMORAL COMPETENT: NORMAL
BH CV LOWER VASCULAR LEFT MID FEMORAL COMPRESS: NORMAL
BH CV LOWER VASCULAR LEFT MID FEMORAL PHASIC: NORMAL
BH CV LOWER VASCULAR LEFT MID FEMORAL SPONT: NORMAL
BH CV LOWER VASCULAR LEFT PERONEAL COMPRESS: NORMAL
BH CV LOWER VASCULAR LEFT POPLITEAL AUGMENT: NORMAL
BH CV LOWER VASCULAR LEFT POPLITEAL COMPETENT: NORMAL
BH CV LOWER VASCULAR LEFT POPLITEAL COMPRESS: NORMAL
BH CV LOWER VASCULAR LEFT POPLITEAL PHASIC: NORMAL
BH CV LOWER VASCULAR LEFT POPLITEAL SPONT: NORMAL
BH CV LOWER VASCULAR LEFT POSTERIOR TIBIAL COMPRESS: NORMAL
BH CV LOWER VASCULAR LEFT PROXIMAL FEMORAL COMPRESS: NORMAL
BH CV LOWER VASCULAR LEFT SAPHENOFEMORAL JUNCTION COMPRESS: NORMAL
BH CV LOWER VASCULAR RIGHT COMMON FEMORAL AUGMENT: NORMAL
BH CV LOWER VASCULAR RIGHT COMMON FEMORAL COMPETENT: NORMAL
BH CV LOWER VASCULAR RIGHT COMMON FEMORAL COMPRESS: NORMAL
BH CV LOWER VASCULAR RIGHT COMMON FEMORAL PHASIC: NORMAL
BH CV LOWER VASCULAR RIGHT COMMON FEMORAL SPONT: NORMAL
BH CV LOWER VASCULAR RIGHT DISTAL FEMORAL COMPRESS: NORMAL
BH CV LOWER VASCULAR RIGHT GASTRONEMIUS COMPRESS: NORMAL
BH CV LOWER VASCULAR RIGHT GREATER SAPH AK COMPRESS: NORMAL
BH CV LOWER VASCULAR RIGHT GREATER SAPH BK COMPRESS: NORMAL
BH CV LOWER VASCULAR RIGHT LESSER SAPH COMPRESS: NORMAL
BH CV LOWER VASCULAR RIGHT MID FEMORAL AUGMENT: NORMAL
BH CV LOWER VASCULAR RIGHT MID FEMORAL COMPETENT: NORMAL
BH CV LOWER VASCULAR RIGHT MID FEMORAL COMPRESS: NORMAL
BH CV LOWER VASCULAR RIGHT MID FEMORAL PHASIC: NORMAL
BH CV LOWER VASCULAR RIGHT MID FEMORAL SPONT: NORMAL
BH CV LOWER VASCULAR RIGHT PERONEAL COMPRESS: NORMAL
BH CV LOWER VASCULAR RIGHT POPLITEAL AUGMENT: NORMAL
BH CV LOWER VASCULAR RIGHT POPLITEAL COMPETENT: NORMAL
BH CV LOWER VASCULAR RIGHT POPLITEAL COMPRESS: NORMAL
BH CV LOWER VASCULAR RIGHT POPLITEAL PHASIC: NORMAL
BH CV LOWER VASCULAR RIGHT POPLITEAL SPONT: NORMAL
BH CV LOWER VASCULAR RIGHT POSTERIOR TIBIAL COMPRESS: NORMAL
BH CV LOWER VASCULAR RIGHT PROXIMAL FEMORAL COMPRESS: NORMAL
BH CV LOWER VASCULAR RIGHT SAPHENOFEMORAL JUNCTION COMPRESS: NORMAL
BILIRUB UR QL STRIP: NEGATIVE
BUN SERPL-MCNC: 25.1 MG/DL (ref 6–20)
BUN/CREAT SERPL: 12.8 (ref 7–25)
BUPRENORPHINE SERPL-MCNC: NEGATIVE NG/ML
CALCIUM SPEC-SCNC: 8.5 MG/DL (ref 8.6–10.5)
CANNABINOIDS SERPL QL: NEGATIVE
CHLORIDE SERPL-SCNC: 97 MMOL/L (ref 98–107)
CHOLEST SERPL-MCNC: 119 MG/DL (ref 0–200)
CLARITY UR: CLEAR
CO2 SERPL-SCNC: 22.3 MMOL/L (ref 22–29)
COCAINE UR QL: NEGATIVE
COLOR UR: YELLOW
CREAT SERPL-MCNC: 1.96 MG/DL (ref 0.76–1.27)
DEPRECATED RDW RBC AUTO: 40.6 FL (ref 37–54)
EGFRCR SERPLBLD CKD-EPI 2021: 41.9 ML/MIN/1.73
ERYTHROCYTE [DISTWIDTH] IN BLOOD BY AUTOMATED COUNT: 11.9 % (ref 12.3–15.4)
FENTANYL UR-MCNC: NEGATIVE NG/ML
GEN 5 1HR TROPONIN T REFLEX: 9 NG/L
GLUCOSE SERPL-MCNC: 128 MG/DL (ref 65–99)
GLUCOSE UR STRIP-MCNC: NEGATIVE MG/DL
HBA1C MFR BLD: 5.1 % (ref 4.8–5.6)
HCT VFR BLD AUTO: 32.5 % (ref 37.5–51)
HCV AB SER QL: NORMAL
HDLC SERPL-MCNC: 38 MG/DL (ref 40–60)
HGB BLD-MCNC: 11.4 G/DL (ref 13–17.7)
HGB UR QL STRIP.AUTO: NEGATIVE
HIV 1+2 AB+HIV1P24 AG SERPLBLD IA.RAPID: NORMAL
HIV 1+2 AB+HIV1P24 AG SERPLBLD IA.RAPID: NORMAL
HOLD SPECIMEN: NORMAL
INR PPP: 1.09 (ref 0.86–1.15)
IVRT: 61 MS
KETONES UR QL STRIP: NEGATIVE
LDL/HDL RATIO NULL: ABNORMAL
LDLC SERPL CALC-MCNC: 18 MG/DL (ref 0–100)
LEFT ATRIUM VOLUME INDEX: 14.1 ML/M2
LEUKOCYTE ESTERASE UR QL STRIP.AUTO: NEGATIVE
MCH RBC QN AUTO: 32.2 PG (ref 26.6–33)
MCHC RBC AUTO-ENTMCNC: 35.1 G/DL (ref 31.5–35.7)
MCV RBC AUTO: 91.8 FL (ref 79–97)
METHADONE UR QL SCN: NEGATIVE
NITRITE UR QL STRIP: NEGATIVE
OPIATES UR QL: NEGATIVE
OXYCODONE UR QL SCN: NEGATIVE
PCP UR QL SCN: NEGATIVE
PH UR STRIP.AUTO: 5.5 [PH] (ref 5–8)
PLATELET # BLD AUTO: 100 10*3/MM3 (ref 140–450)
PMV BLD AUTO: 11.1 FL (ref 6–12)
POTASSIUM SERPL-SCNC: 4.4 MMOL/L (ref 3.5–5.2)
PROT UR QL STRIP: NEGATIVE
PROTHROMBIN TIME: 14.6 SECONDS (ref 11.8–14.9)
QT INTERVAL: 356 MS
QTC INTERVAL: 426 MS
RBC # BLD AUTO: 3.54 10*6/MM3 (ref 4.14–5.8)
SODIUM SERPL-SCNC: 132 MMOL/L (ref 136–145)
SP GR UR STRIP: 1.01 (ref 1–1.03)
TRICYCLICS UR QL SCN: NEGATIVE
TRIGL SERPL-MCNC: 468 MG/DL (ref 0–150)
TROPONIN T NUMERIC DELTA: -4 NG/L
UROBILINOGEN UR QL STRIP: NORMAL
VLDLC SERPL-MCNC: 63 MG/DL (ref 5–40)
WBC NRBC COR # BLD AUTO: 3.66 10*3/MM3 (ref 3.4–10.8)

## 2025-08-01 PROCEDURE — 25010000002 ONDANSETRON PER 1 MG: Performed by: FAMILY MEDICINE

## 2025-08-01 PROCEDURE — 93970 EXTREMITY STUDY: CPT

## 2025-08-01 PROCEDURE — 81003 URINALYSIS AUTO W/O SCOPE: CPT | Performed by: FAMILY MEDICINE

## 2025-08-01 PROCEDURE — 99291 CRITICAL CARE FIRST HOUR: CPT | Performed by: STUDENT IN AN ORGANIZED HEALTH CARE EDUCATION/TRAINING PROGRAM

## 2025-08-01 PROCEDURE — 86803 HEPATITIS C AB TEST: CPT | Performed by: NURSE PRACTITIONER

## 2025-08-01 PROCEDURE — 94660 CPAP INITIATION&MGMT: CPT

## 2025-08-01 PROCEDURE — 84484 ASSAY OF TROPONIN QUANT: CPT | Performed by: EMERGENCY MEDICINE

## 2025-08-01 PROCEDURE — 80048 BASIC METABOLIC PNL TOTAL CA: CPT | Performed by: FAMILY MEDICINE

## 2025-08-01 PROCEDURE — 25010000002 HEPARIN (PORCINE) PER 1000 UNITS: Performed by: NURSE PRACTITIONER

## 2025-08-01 PROCEDURE — 25010000002 MAGNESIUM SULFATE IN D5W 1G/100ML (PREMIX) 1-5 GM/100ML-% SOLUTION: Performed by: FAMILY MEDICINE

## 2025-08-01 PROCEDURE — 83036 HEMOGLOBIN GLYCOSYLATED A1C: CPT | Performed by: INTERNAL MEDICINE

## 2025-08-01 PROCEDURE — 99232 SBSQ HOSP IP/OBS MODERATE 35: CPT | Performed by: STUDENT IN AN ORGANIZED HEALTH CARE EDUCATION/TRAINING PROGRAM

## 2025-08-01 PROCEDURE — 80061 LIPID PANEL: CPT | Performed by: INTERNAL MEDICINE

## 2025-08-01 PROCEDURE — 80307 DRUG TEST PRSMV CHEM ANLYZR: CPT | Performed by: NURSE PRACTITIONER

## 2025-08-01 PROCEDURE — 25810000003 LACTATED RINGERS PER 1000 ML: Performed by: STUDENT IN AN ORGANIZED HEALTH CARE EDUCATION/TRAINING PROGRAM

## 2025-08-01 PROCEDURE — 94799 UNLISTED PULMONARY SVC/PX: CPT

## 2025-08-01 PROCEDURE — B2111ZZ FLUOROSCOPY OF MULTIPLE CORONARY ARTERIES USING LOW OSMOLAR CONTRAST: ICD-10-PCS | Performed by: STUDENT IN AN ORGANIZED HEALTH CARE EDUCATION/TRAINING PROGRAM

## 2025-08-01 PROCEDURE — 85730 THROMBOPLASTIN TIME PARTIAL: CPT | Performed by: STUDENT IN AN ORGANIZED HEALTH CARE EDUCATION/TRAINING PROGRAM

## 2025-08-01 PROCEDURE — 25010000002 SULFUR HEXAFLUORIDE MICROSPH 60.7-25 MG RECONSTITUTED SUSPENSION: Performed by: STUDENT IN AN ORGANIZED HEALTH CARE EDUCATION/TRAINING PROGRAM

## 2025-08-01 PROCEDURE — 25010000002 FAMOTIDINE 10 MG/ML SOLUTION: Performed by: NURSE PRACTITIONER

## 2025-08-01 PROCEDURE — 93005 ELECTROCARDIOGRAM TRACING: CPT | Performed by: INTERNAL MEDICINE

## 2025-08-01 PROCEDURE — 25810000003 SODIUM CHLORIDE 0.9 % SOLUTION: Performed by: FAMILY MEDICINE

## 2025-08-01 PROCEDURE — 93970 EXTREMITY STUDY: CPT | Performed by: SURGERY

## 2025-08-01 PROCEDURE — 70450 CT HEAD/BRAIN W/O DYE: CPT

## 2025-08-01 PROCEDURE — 93306 TTE W/DOPPLER COMPLETE: CPT

## 2025-08-01 PROCEDURE — 25010000002 THIAMINE PER 100 MG: Performed by: FAMILY MEDICINE

## 2025-08-01 PROCEDURE — 85610 PROTHROMBIN TIME: CPT | Performed by: STUDENT IN AN ORGANIZED HEALTH CARE EDUCATION/TRAINING PROGRAM

## 2025-08-01 PROCEDURE — 4A023N7 MEASUREMENT OF CARDIAC SAMPLING AND PRESSURE, LEFT HEART, PERCUTANEOUS APPROACH: ICD-10-PCS | Performed by: STUDENT IN AN ORGANIZED HEALTH CARE EDUCATION/TRAINING PROGRAM

## 2025-08-01 PROCEDURE — 25010000002 THIAMINE HCL 200 MG/2ML SOLUTION: Performed by: FAMILY MEDICINE

## 2025-08-01 PROCEDURE — 25010000002 MAGNESIUM SULFATE 2 GM/50ML SOLUTION: Performed by: NURSE PRACTITIONER

## 2025-08-01 PROCEDURE — G0475 HIV COMBINATION ASSAY: HCPCS | Performed by: NURSE PRACTITIONER

## 2025-08-01 PROCEDURE — 93306 TTE W/DOPPLER COMPLETE: CPT | Performed by: INTERNAL MEDICINE

## 2025-08-01 PROCEDURE — B2151ZZ FLUOROSCOPY OF LEFT HEART USING LOW OSMOLAR CONTRAST: ICD-10-PCS | Performed by: STUDENT IN AN ORGANIZED HEALTH CARE EDUCATION/TRAINING PROGRAM

## 2025-08-01 PROCEDURE — 85027 COMPLETE CBC AUTOMATED: CPT | Performed by: FAMILY MEDICINE

## 2025-08-01 RX ORDER — FOLIC ACID 1 MG/1
1 TABLET ORAL DAILY
Status: DISCONTINUED | OUTPATIENT
Start: 2025-08-01 | End: 2025-08-01

## 2025-08-01 RX ORDER — DIAZEPAM 5 MG/1
10 TABLET ORAL
Status: DISCONTINUED | OUTPATIENT
Start: 2025-08-01 | End: 2025-08-03 | Stop reason: HOSPADM

## 2025-08-01 RX ORDER — DIAZEPAM 5 MG/1
20 TABLET ORAL
Status: DISCONTINUED | OUTPATIENT
Start: 2025-08-01 | End: 2025-08-03 | Stop reason: HOSPADM

## 2025-08-01 RX ORDER — THIAMINE HYDROCHLORIDE 100 MG/ML
200 INJECTION, SOLUTION INTRAMUSCULAR; INTRAVENOUS EVERY 8 HOURS SCHEDULED
Status: DISCONTINUED | OUTPATIENT
Start: 2025-08-01 | End: 2025-08-01 | Stop reason: SDUPTHER

## 2025-08-01 RX ORDER — DIAZEPAM 10 MG/2ML
15 INJECTION, SOLUTION INTRAMUSCULAR; INTRAVENOUS
Status: DISCONTINUED | OUTPATIENT
Start: 2025-08-01 | End: 2025-08-03 | Stop reason: HOSPADM

## 2025-08-01 RX ORDER — SILDENAFIL 100 MG/1
50 TABLET, FILM COATED ORAL DAILY PRN
COMMUNITY

## 2025-08-01 RX ORDER — MAGNESIUM SULFATE 1 G/100ML
1 INJECTION INTRAVENOUS ONCE
Status: COMPLETED | OUTPATIENT
Start: 2025-08-01 | End: 2025-08-01

## 2025-08-01 RX ORDER — FOLIC ACID 1 MG/1
1 TABLET ORAL DAILY
Status: DISCONTINUED | OUTPATIENT
Start: 2025-08-01 | End: 2025-08-03 | Stop reason: HOSPADM

## 2025-08-01 RX ORDER — MULTIPLE VITAMINS W/ MINERALS TAB 9MG-400MCG
1 TAB ORAL DAILY
Status: DISCONTINUED | OUTPATIENT
Start: 2025-08-01 | End: 2025-08-03 | Stop reason: HOSPADM

## 2025-08-01 RX ORDER — FAMOTIDINE 10 MG/ML
20 INJECTION, SOLUTION INTRAVENOUS DAILY
Status: DISCONTINUED | OUTPATIENT
Start: 2025-08-01 | End: 2025-08-03 | Stop reason: HOSPADM

## 2025-08-01 RX ORDER — DIAZEPAM 10 MG/2ML
10 INJECTION, SOLUTION INTRAMUSCULAR; INTRAVENOUS
Status: DISCONTINUED | OUTPATIENT
Start: 2025-08-01 | End: 2025-08-03 | Stop reason: HOSPADM

## 2025-08-01 RX ORDER — DIAZEPAM 5 MG/1
15 TABLET ORAL
Status: DISCONTINUED | OUTPATIENT
Start: 2025-08-01 | End: 2025-08-03 | Stop reason: HOSPADM

## 2025-08-01 RX ORDER — SODIUM CHLORIDE, SODIUM LACTATE, POTASSIUM CHLORIDE, CALCIUM CHLORIDE 600; 310; 30; 20 MG/100ML; MG/100ML; MG/100ML; MG/100ML
100 INJECTION, SOLUTION INTRAVENOUS CONTINUOUS
Status: DISCONTINUED | OUTPATIENT
Start: 2025-08-01 | End: 2025-08-02

## 2025-08-01 RX ORDER — THIAMINE HYDROCHLORIDE 100 MG/ML
200 INJECTION, SOLUTION INTRAMUSCULAR; INTRAVENOUS EVERY 8 HOURS SCHEDULED
Status: DISCONTINUED | OUTPATIENT
Start: 2025-08-01 | End: 2025-08-03 | Stop reason: HOSPADM

## 2025-08-01 RX ORDER — DIAZEPAM 10 MG/2ML
20 INJECTION, SOLUTION INTRAMUSCULAR; INTRAVENOUS
Status: DISCONTINUED | OUTPATIENT
Start: 2025-08-01 | End: 2025-08-03 | Stop reason: HOSPADM

## 2025-08-01 RX ORDER — MAGNESIUM SULFATE HEPTAHYDRATE 40 MG/ML
2 INJECTION, SOLUTION INTRAVENOUS ONCE
Status: COMPLETED | OUTPATIENT
Start: 2025-08-01 | End: 2025-08-01

## 2025-08-01 RX ORDER — NALTREXONE HYDROCHLORIDE 50 MG/1
50 TABLET, FILM COATED ORAL DAILY PRN
COMMUNITY

## 2025-08-01 RX ORDER — HEPARIN SODIUM 5000 [USP'U]/ML
5000 INJECTION, SOLUTION INTRAVENOUS; SUBCUTANEOUS EVERY 8 HOURS SCHEDULED
Status: DISCONTINUED | OUTPATIENT
Start: 2025-08-01 | End: 2025-08-03 | Stop reason: HOSPADM

## 2025-08-01 RX ORDER — METOPROLOL SUCCINATE 200 MG/1
100 TABLET, EXTENDED RELEASE ORAL DAILY
COMMUNITY

## 2025-08-01 RX ADMIN — Medication 10 ML: at 21:39

## 2025-08-01 RX ADMIN — Medication 10 ML: at 00:25

## 2025-08-01 RX ADMIN — ONDANSETRON 4 MG: 2 INJECTION INTRAMUSCULAR; INTRAVENOUS at 06:11

## 2025-08-01 RX ADMIN — SODIUM CHLORIDE 100 ML/HR: 9 INJECTION, SOLUTION INTRAVENOUS at 00:25

## 2025-08-01 RX ADMIN — THIAMINE HYDROCHLORIDE 200 MG: 100 INJECTION, SOLUTION INTRAMUSCULAR; INTRAVENOUS at 21:39

## 2025-08-01 RX ADMIN — SODIUM CHLORIDE, POTASSIUM CHLORIDE, SODIUM LACTATE AND CALCIUM CHLORIDE 100 ML/HR: 600; 310; 30; 20 INJECTION, SOLUTION INTRAVENOUS at 14:18

## 2025-08-01 RX ADMIN — THIAMINE HYDROCHLORIDE 200 MG: 100 INJECTION, SOLUTION INTRAMUSCULAR; INTRAVENOUS at 06:03

## 2025-08-01 RX ADMIN — HEPARIN SODIUM 5000 UNITS: 5000 INJECTION INTRAVENOUS; SUBCUTANEOUS at 08:33

## 2025-08-01 RX ADMIN — FOLIC ACID 1 MG: 1 TABLET ORAL at 08:33

## 2025-08-01 RX ADMIN — SULFUR HEXAFLUORIDE 2 ML: KIT at 07:41

## 2025-08-01 RX ADMIN — MAGNESIUM SULFATE HEPTAHYDRATE 1 G: 1 INJECTION, SOLUTION INTRAVENOUS at 02:25

## 2025-08-01 RX ADMIN — THIAMINE HYDROCHLORIDE 200 MG: 100 INJECTION, SOLUTION INTRAMUSCULAR; INTRAVENOUS at 14:18

## 2025-08-01 RX ADMIN — MAGNESIUM SULFATE HEPTAHYDRATE 2 G: 40 INJECTION, SOLUTION INTRAVENOUS at 08:31

## 2025-08-01 RX ADMIN — FOLIC ACID 1 MG: 1 TABLET ORAL at 14:18

## 2025-08-01 RX ADMIN — FAMOTIDINE 20 MG: 10 INJECTION INTRAVENOUS at 08:32

## 2025-08-01 RX ADMIN — Medication 10 ML: at 08:37

## 2025-08-01 RX ADMIN — Medication 1 TABLET: at 08:33

## 2025-08-01 RX ADMIN — Medication 5 MG: at 21:39

## 2025-08-01 RX ADMIN — MUPIROCIN 1 APPLICATION: 20 OINTMENT TOPICAL at 00:25

## 2025-08-01 RX ADMIN — MUPIROCIN 1 APPLICATION: 20 OINTMENT TOPICAL at 21:38

## 2025-08-02 ENCOUNTER — APPOINTMENT (OUTPATIENT)
Dept: CT IMAGING | Facility: HOSPITAL | Age: 45
DRG: 682 | End: 2025-08-02
Payer: OTHER GOVERNMENT

## 2025-08-02 LAB
ANION GAP SERPL CALCULATED.3IONS-SCNC: 12.8 MMOL/L (ref 5–15)
BUN SERPL-MCNC: 18.6 MG/DL (ref 6–20)
BUN/CREAT SERPL: 18.2 (ref 7–25)
CALCIUM SPEC-SCNC: 9 MG/DL (ref 8.6–10.5)
CHLORIDE SERPL-SCNC: 99 MMOL/L (ref 98–107)
CO2 SERPL-SCNC: 21.2 MMOL/L (ref 22–29)
CREAT SERPL-MCNC: 1.02 MG/DL (ref 0.76–1.27)
DEPRECATED RDW RBC AUTO: 37.3 FL (ref 37–54)
EGFRCR SERPLBLD CKD-EPI 2021: 91.8 ML/MIN/1.73
ERYTHROCYTE [DISTWIDTH] IN BLOOD BY AUTOMATED COUNT: 11.6 % (ref 12.3–15.4)
GLUCOSE SERPL-MCNC: 113 MG/DL (ref 65–99)
HCT VFR BLD AUTO: 31 % (ref 37.5–51)
HGB BLD-MCNC: 11.2 G/DL (ref 13–17.7)
MAGNESIUM SERPL-MCNC: 1.4 MG/DL (ref 1.6–2.6)
MCH RBC QN AUTO: 32.4 PG (ref 26.6–33)
MCHC RBC AUTO-ENTMCNC: 36.1 G/DL (ref 31.5–35.7)
MCV RBC AUTO: 89.6 FL (ref 79–97)
PHOSPHATE SERPL-MCNC: 2.5 MG/DL (ref 2.5–4.5)
PLATELET # BLD AUTO: 58 10*3/MM3 (ref 140–450)
PMV BLD AUTO: 11.1 FL (ref 6–12)
POTASSIUM SERPL-SCNC: 4.8 MMOL/L (ref 3.5–5.2)
RBC # BLD AUTO: 3.46 10*6/MM3 (ref 4.14–5.8)
SODIUM SERPL-SCNC: 133 MMOL/L (ref 136–145)
WBC NRBC COR # BLD AUTO: 3.95 10*3/MM3 (ref 3.4–10.8)

## 2025-08-02 PROCEDURE — 70450 CT HEAD/BRAIN W/O DYE: CPT

## 2025-08-02 PROCEDURE — 83735 ASSAY OF MAGNESIUM: CPT | Performed by: NURSE PRACTITIONER

## 2025-08-02 PROCEDURE — 94799 UNLISTED PULMONARY SVC/PX: CPT

## 2025-08-02 PROCEDURE — 25010000002 THIAMINE PER 100 MG: Performed by: FAMILY MEDICINE

## 2025-08-02 PROCEDURE — 25010000002 FAMOTIDINE 10 MG/ML SOLUTION: Performed by: NURSE PRACTITIONER

## 2025-08-02 PROCEDURE — 99291 CRITICAL CARE FIRST HOUR: CPT | Performed by: STUDENT IN AN ORGANIZED HEALTH CARE EDUCATION/TRAINING PROGRAM

## 2025-08-02 PROCEDURE — 80048 BASIC METABOLIC PNL TOTAL CA: CPT | Performed by: FAMILY MEDICINE

## 2025-08-02 PROCEDURE — 25010000002 MAGNESIUM SULFATE 2 GM/50ML SOLUTION: Performed by: STUDENT IN AN ORGANIZED HEALTH CARE EDUCATION/TRAINING PROGRAM

## 2025-08-02 PROCEDURE — 85027 COMPLETE CBC AUTOMATED: CPT | Performed by: FAMILY MEDICINE

## 2025-08-02 PROCEDURE — 99221 1ST HOSP IP/OBS SF/LOW 40: CPT | Performed by: NEUROLOGICAL SURGERY

## 2025-08-02 PROCEDURE — 25010000002 METOPROLOL TARTRATE 5 MG/5ML SOLUTION

## 2025-08-02 PROCEDURE — 99232 SBSQ HOSP IP/OBS MODERATE 35: CPT | Performed by: STUDENT IN AN ORGANIZED HEALTH CARE EDUCATION/TRAINING PROGRAM

## 2025-08-02 PROCEDURE — 84100 ASSAY OF PHOSPHORUS: CPT | Performed by: NURSE PRACTITIONER

## 2025-08-02 RX ORDER — LABETALOL HYDROCHLORIDE 5 MG/ML
10 INJECTION, SOLUTION INTRAVENOUS EVERY 4 HOURS PRN
Status: DISCONTINUED | OUTPATIENT
Start: 2025-08-02 | End: 2025-08-03 | Stop reason: HOSPADM

## 2025-08-02 RX ORDER — MAGNESIUM SULFATE HEPTAHYDRATE 40 MG/ML
2 INJECTION, SOLUTION INTRAVENOUS
Status: COMPLETED | OUTPATIENT
Start: 2025-08-02 | End: 2025-08-02

## 2025-08-02 RX ORDER — METOPROLOL TARTRATE 1 MG/ML
INJECTION, SOLUTION INTRAVENOUS
Status: COMPLETED
Start: 2025-08-02 | End: 2025-08-02

## 2025-08-02 RX ORDER — METOPROLOL SUCCINATE 50 MG/1
100 TABLET, EXTENDED RELEASE ORAL
Status: DISCONTINUED | OUTPATIENT
Start: 2025-08-02 | End: 2025-08-03 | Stop reason: HOSPADM

## 2025-08-02 RX ADMIN — FOLIC ACID 1 MG: 1 TABLET ORAL at 08:01

## 2025-08-02 RX ADMIN — MUPIROCIN 1 APPLICATION: 20 OINTMENT TOPICAL at 08:02

## 2025-08-02 RX ADMIN — THIAMINE HYDROCHLORIDE 200 MG: 100 INJECTION, SOLUTION INTRAMUSCULAR; INTRAVENOUS at 21:13

## 2025-08-02 RX ADMIN — FAMOTIDINE 20 MG: 10 INJECTION INTRAVENOUS at 08:01

## 2025-08-02 RX ADMIN — DIAZEPAM 10 MG: 5 TABLET ORAL at 08:19

## 2025-08-02 RX ADMIN — Medication 10 ML: at 21:13

## 2025-08-02 RX ADMIN — MUPIROCIN 1 APPLICATION: 20 OINTMENT TOPICAL at 21:13

## 2025-08-02 RX ADMIN — MAGNESIUM SULFATE HEPTAHYDRATE 2 G: 40 INJECTION, SOLUTION INTRAVENOUS at 06:19

## 2025-08-02 RX ADMIN — METOPROLOL SUCCINATE 100 MG: 50 TABLET, EXTENDED RELEASE ORAL at 09:37

## 2025-08-02 RX ADMIN — MAGNESIUM SULFATE HEPTAHYDRATE 2 G: 40 INJECTION, SOLUTION INTRAVENOUS at 03:53

## 2025-08-02 RX ADMIN — THIAMINE HYDROCHLORIDE 200 MG: 100 INJECTION, SOLUTION INTRAMUSCULAR; INTRAVENOUS at 13:38

## 2025-08-02 RX ADMIN — Medication 10 ML: at 08:02

## 2025-08-02 RX ADMIN — METOROPROLOL TARTRATE 5 MG: 5 INJECTION, SOLUTION INTRAVENOUS at 07:47

## 2025-08-02 RX ADMIN — THIAMINE HYDROCHLORIDE 200 MG: 100 INJECTION, SOLUTION INTRAMUSCULAR; INTRAVENOUS at 06:19

## 2025-08-02 RX ADMIN — Medication 1 TABLET: at 08:01

## 2025-08-02 RX ADMIN — MAGNESIUM SULFATE HEPTAHYDRATE 2 G: 40 INJECTION, SOLUTION INTRAVENOUS at 08:02

## 2025-08-03 VITALS
HEIGHT: 72 IN | RESPIRATION RATE: 18 BRPM | TEMPERATURE: 98.6 F | BODY MASS INDEX: 33.65 KG/M2 | OXYGEN SATURATION: 99 % | DIASTOLIC BLOOD PRESSURE: 108 MMHG | HEART RATE: 79 BPM | SYSTOLIC BLOOD PRESSURE: 174 MMHG | WEIGHT: 248.46 LBS

## 2025-08-03 PROBLEM — E86.0 DEHYDRATION: Status: RESOLVED | Noted: 2025-08-01 | Resolved: 2025-08-03

## 2025-08-03 PROBLEM — R57.9 SHOCK: Status: RESOLVED | Noted: 2025-08-01 | Resolved: 2025-08-03

## 2025-08-03 PROBLEM — R94.31 ABNORMAL ECG: Status: RESOLVED | Noted: 2025-08-01 | Resolved: 2025-08-03

## 2025-08-03 PROBLEM — N17.9 AKI (ACUTE KIDNEY INJURY): Status: RESOLVED | Noted: 2025-08-01 | Resolved: 2025-08-03

## 2025-08-03 PROBLEM — R55 HYPOTENSIVE SYNCOPE: Status: RESOLVED | Noted: 2025-07-31 | Resolved: 2025-08-03

## 2025-08-03 LAB
ANION GAP SERPL CALCULATED.3IONS-SCNC: 7.7 MMOL/L (ref 5–15)
BASOPHILS # BLD AUTO: 0.01 10*3/MM3 (ref 0–0.2)
BASOPHILS NFR BLD AUTO: 0.4 % (ref 0–1.5)
BUN SERPL-MCNC: 15.4 MG/DL (ref 6–20)
BUN/CREAT SERPL: 15 (ref 7–25)
CALCIUM SPEC-SCNC: 9.2 MG/DL (ref 8.6–10.5)
CHLORIDE SERPL-SCNC: 100 MMOL/L (ref 98–107)
CO2 SERPL-SCNC: 26.3 MMOL/L (ref 22–29)
CREAT SERPL-MCNC: 1.03 MG/DL (ref 0.76–1.27)
DEPRECATED RDW RBC AUTO: 41.1 FL (ref 37–54)
EGFRCR SERPLBLD CKD-EPI 2021: 90.7 ML/MIN/1.73
EOSINOPHIL # BLD AUTO: 0.07 10*3/MM3 (ref 0–0.4)
EOSINOPHIL NFR BLD AUTO: 3 % (ref 0.3–6.2)
ERYTHROCYTE [DISTWIDTH] IN BLOOD BY AUTOMATED COUNT: 12 % (ref 12.3–15.4)
GLUCOSE SERPL-MCNC: 117 MG/DL (ref 65–99)
HCT VFR BLD AUTO: 28.9 % (ref 37.5–51)
HGB BLD-MCNC: 9.8 G/DL (ref 13–17.7)
IMM GRANULOCYTES # BLD AUTO: 0.01 10*3/MM3 (ref 0–0.05)
IMM GRANULOCYTES NFR BLD AUTO: 0.4 % (ref 0–0.5)
LYMPHOCYTES # BLD AUTO: 0.75 10*3/MM3 (ref 0.7–3.1)
LYMPHOCYTES NFR BLD AUTO: 32.2 % (ref 19.6–45.3)
MAGNESIUM SERPL-MCNC: 1.7 MG/DL (ref 1.6–2.6)
MCH RBC QN AUTO: 32 PG (ref 26.6–33)
MCHC RBC AUTO-ENTMCNC: 33.9 G/DL (ref 31.5–35.7)
MCV RBC AUTO: 94.4 FL (ref 79–97)
MONOCYTES # BLD AUTO: 0.28 10*3/MM3 (ref 0.1–0.9)
MONOCYTES NFR BLD AUTO: 12 % (ref 5–12)
NEUTROPHILS NFR BLD AUTO: 1.21 10*3/MM3 (ref 1.7–7)
NEUTROPHILS NFR BLD AUTO: 52 % (ref 42.7–76)
NRBC BLD AUTO-RTO: 0 /100 WBC (ref 0–0.2)
PLATELET # BLD AUTO: 58 10*3/MM3 (ref 140–450)
PMV BLD AUTO: 10.9 FL (ref 6–12)
POTASSIUM SERPL-SCNC: 5.3 MMOL/L (ref 3.5–5.2)
RBC # BLD AUTO: 3.06 10*6/MM3 (ref 4.14–5.8)
RBC MORPH BLD: NORMAL
SMALL PLATELETS BLD QL SMEAR: NORMAL
SODIUM SERPL-SCNC: 134 MMOL/L (ref 136–145)
WBC MORPH BLD: NORMAL
WBC NRBC COR # BLD AUTO: 2.33 10*3/MM3 (ref 3.4–10.8)

## 2025-08-03 PROCEDURE — 25010000002 FAMOTIDINE 10 MG/ML SOLUTION: Performed by: NURSE PRACTITIONER

## 2025-08-03 PROCEDURE — 25010000002 THIAMINE PER 100 MG: Performed by: FAMILY MEDICINE

## 2025-08-03 PROCEDURE — 80048 BASIC METABOLIC PNL TOTAL CA: CPT | Performed by: STUDENT IN AN ORGANIZED HEALTH CARE EDUCATION/TRAINING PROGRAM

## 2025-08-03 PROCEDURE — 85025 COMPLETE CBC W/AUTO DIFF WBC: CPT | Performed by: STUDENT IN AN ORGANIZED HEALTH CARE EDUCATION/TRAINING PROGRAM

## 2025-08-03 PROCEDURE — 99239 HOSP IP/OBS DSCHRG MGMT >30: CPT | Performed by: STUDENT IN AN ORGANIZED HEALTH CARE EDUCATION/TRAINING PROGRAM

## 2025-08-03 PROCEDURE — 83735 ASSAY OF MAGNESIUM: CPT | Performed by: STUDENT IN AN ORGANIZED HEALTH CARE EDUCATION/TRAINING PROGRAM

## 2025-08-03 PROCEDURE — 85007 BL SMEAR W/DIFF WBC COUNT: CPT | Performed by: STUDENT IN AN ORGANIZED HEALTH CARE EDUCATION/TRAINING PROGRAM

## 2025-08-03 RX ADMIN — THIAMINE HYDROCHLORIDE 200 MG: 100 INJECTION, SOLUTION INTRAMUSCULAR; INTRAVENOUS at 05:53

## 2025-08-03 RX ADMIN — FOLIC ACID 1 MG: 1 TABLET ORAL at 09:06

## 2025-08-03 RX ADMIN — Medication 10 ML: at 09:05

## 2025-08-03 RX ADMIN — FAMOTIDINE 20 MG: 10 INJECTION INTRAVENOUS at 09:07

## 2025-08-03 RX ADMIN — METOPROLOL SUCCINATE 100 MG: 50 TABLET, EXTENDED RELEASE ORAL at 09:06

## 2025-08-03 RX ADMIN — Medication 1 TABLET: at 09:06

## 2025-08-03 RX ADMIN — MUPIROCIN 1 APPLICATION: 20 OINTMENT TOPICAL at 09:05

## 2025-08-04 ENCOUNTER — READMISSION MANAGEMENT (OUTPATIENT)
Dept: CALL CENTER | Facility: HOSPITAL | Age: 45
End: 2025-08-04
Payer: OTHER GOVERNMENT

## 2025-08-08 ENCOUNTER — READMISSION MANAGEMENT (OUTPATIENT)
Dept: CALL CENTER | Facility: HOSPITAL | Age: 45
End: 2025-08-08
Payer: OTHER GOVERNMENT

## 2025-08-13 LAB
QT INTERVAL: 356 MS
QT INTERVAL: 392 MS
QTC INTERVAL: 423 MS
QTC INTERVAL: 426 MS

## 2025-08-18 ENCOUNTER — READMISSION MANAGEMENT (OUTPATIENT)
Dept: CALL CENTER | Facility: HOSPITAL | Age: 45
End: 2025-08-18
Payer: OTHER GOVERNMENT

## 2025-08-19 ENCOUNTER — READMISSION MANAGEMENT (OUTPATIENT)
Dept: CALL CENTER | Facility: HOSPITAL | Age: 45
End: 2025-08-19
Payer: OTHER GOVERNMENT

## (undated) DEVICE — CATH F5INF TLPIGST145 110CM6SH: Brand: INFINITI

## (undated) DEVICE — SOL IRRG H2O PL/BG 1000ML STRL

## (undated) DEVICE — GW FC FLOP/TP .035 260CM 3MM

## (undated) DEVICE — GLIDESHEATH SLENDER STAINLESS STEEL KIT: Brand: GLIDESHEATH SLENDER

## (undated) DEVICE — Device

## (undated) DEVICE — PAD GRND REM POLYHESIVE A/ DISP

## (undated) DEVICE — RADIFOCUS GLIDEWIRE: Brand: GLIDEWIRE

## (undated) DEVICE — Device: Brand: DEFENDO AIR/WATER/SUCTION AND BIOPSY VALVE

## (undated) DEVICE — BLCK/BITE BLOX WO/DENTL/RIM W/STRAP 54F

## (undated) DEVICE — FIAPC® PROBE W/ FILTER 2200 A OD 2.3MM/6.9FR; L 2.2M/7.2FT: Brand: ERBE

## (undated) DEVICE — LINER SURG CANSTR SXN S/RIGD 1500CC

## (undated) DEVICE — CATH F6 ST JR 4 100CM: Brand: SUPERTORQUE

## (undated) DEVICE — CONN JET HYDRA H20 AUXILIARY DISP

## (undated) DEVICE — RADIFOCUS OPTITORQUE ANGIOGRAPHIC CATHETER: Brand: OPTITORQUE

## (undated) DEVICE — SOLIDIFIER LIQLOC PLS 1500CC BT